# Patient Record
Sex: FEMALE | Race: WHITE | Employment: FULL TIME | URBAN - METROPOLITAN AREA
[De-identification: names, ages, dates, MRNs, and addresses within clinical notes are randomized per-mention and may not be internally consistent; named-entity substitution may affect disease eponyms.]

---

## 2021-02-12 ENCOUNTER — OFFICE VISIT (OUTPATIENT)
Dept: SURGERY | Age: 46
End: 2021-02-12
Payer: COMMERCIAL

## 2021-02-12 VITALS
DIASTOLIC BLOOD PRESSURE: 70 MMHG | BODY MASS INDEX: 38.98 KG/M2 | HEIGHT: 63 IN | WEIGHT: 220 LBS | TEMPERATURE: 97.1 F | SYSTOLIC BLOOD PRESSURE: 120 MMHG

## 2021-02-12 DIAGNOSIS — D17.1 LIPOMA OF TORSO: Primary | ICD-10-CM

## 2021-02-12 PROCEDURE — 99203 OFFICE O/P NEW LOW 30 MIN: CPT | Performed by: SURGERY

## 2021-02-12 ASSESSMENT — ENCOUNTER SYMPTOMS
BACK PAIN: 1
ABDOMINAL PAIN: 0
EYE ITCHING: 0
EYE DISCHARGE: 0
COLOR CHANGE: 0
ABDOMINAL DISTENTION: 0
APNEA: 0
CHEST TIGHTNESS: 0
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
## 2021-02-12 NOTE — PATIENT INSTRUCTIONS
Unable to appreciate a symptomatic left posterior flank soft tissue mass on exam.  Symptoms may be from a subfascial lipoma or other etiology.   Will order CT of chest to further define anatomy and return to office after the study to discuss results and further options

## 2021-02-17 ENCOUNTER — HOSPITAL ENCOUNTER (OUTPATIENT)
Dept: CT IMAGING | Age: 46
Discharge: HOME | End: 2021-02-17
Payer: COMMERCIAL

## 2021-02-17 DIAGNOSIS — D17.1 LIPOMA OF TORSO: ICD-10-CM

## 2021-02-17 PROCEDURE — 6360000004 HC RX CONTRAST MEDICATION: Performed by: SURGERY

## 2021-02-17 PROCEDURE — 71260 CT THORAX DX C+: CPT

## 2021-02-17 RX ADMIN — IOPAMIDOL 75 ML: 755 INJECTION, SOLUTION INTRAVENOUS at 11:20

## 2021-02-19 ENCOUNTER — OFFICE VISIT (OUTPATIENT)
Dept: SURGERY | Age: 46
End: 2021-02-19
Payer: COMMERCIAL

## 2021-02-19 VITALS
WEIGHT: 221 LBS | TEMPERATURE: 97 F | BODY MASS INDEX: 39.15 KG/M2 | SYSTOLIC BLOOD PRESSURE: 122 MMHG | DIASTOLIC BLOOD PRESSURE: 80 MMHG

## 2021-02-19 DIAGNOSIS — D17.1 LIPOMA OF TORSO: Primary | ICD-10-CM

## 2021-02-19 PROCEDURE — 99213 OFFICE O/P EST LOW 20 MIN: CPT | Performed by: SURGERY

## 2021-02-19 NOTE — PATIENT INSTRUCTIONS
1. No evidence by exam or imaging shows mass that would benefit from excision  2. Suspect musculoskeletal or neurologic cause for symptoms. Was to have neurology evaluation before moving from [place]  3. Will refer to Dr. Conchita Miguel to establish primary care  4.  Follow with general surgery as needed
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
## 2021-04-12 ENCOUNTER — OFFICE VISIT (OUTPATIENT)
Dept: PRIMARY CARE CLINIC | Age: 46
End: 2021-04-12
Payer: COMMERCIAL

## 2021-04-12 VITALS
HEART RATE: 70 BPM | DIASTOLIC BLOOD PRESSURE: 64 MMHG | WEIGHT: 228.6 LBS | SYSTOLIC BLOOD PRESSURE: 124 MMHG | BODY MASS INDEX: 40.49 KG/M2 | OXYGEN SATURATION: 99 %

## 2021-04-12 DIAGNOSIS — R91.8 ABNORMAL CT SCAN OF LUNG: Primary | ICD-10-CM

## 2021-04-12 DIAGNOSIS — J45.30 MILD PERSISTENT ASTHMA WITHOUT COMPLICATION: ICD-10-CM

## 2021-04-12 DIAGNOSIS — M54.2 CHRONIC NECK PAIN: ICD-10-CM

## 2021-04-12 DIAGNOSIS — B00.1 RECURRENT COLD SORES: ICD-10-CM

## 2021-04-12 DIAGNOSIS — R00.2 INTERMITTENT PALPITATIONS: ICD-10-CM

## 2021-04-12 DIAGNOSIS — Z86.69 HISTORY OF CHIARI MALFORMATION: ICD-10-CM

## 2021-04-12 DIAGNOSIS — K63.5 POLYP OF COLON, UNSPECIFIED PART OF COLON, UNSPECIFIED TYPE: ICD-10-CM

## 2021-04-12 DIAGNOSIS — Z87.442 HISTORY OF NEPHROLITHIASIS: ICD-10-CM

## 2021-04-12 DIAGNOSIS — R91.8 LUNG NODULES: ICD-10-CM

## 2021-04-12 DIAGNOSIS — G89.29 CHRONIC NECK PAIN: ICD-10-CM

## 2021-04-12 PROBLEM — J45.909 ASTHMA: Status: ACTIVE | Noted: 2021-04-12

## 2021-04-12 PROBLEM — D17.1 FLANK LIPOMA: Status: ACTIVE | Noted: 2021-04-12

## 2021-04-12 PROCEDURE — 99204 OFFICE O/P NEW MOD 45 MIN: CPT | Performed by: NURSE PRACTITIONER

## 2021-04-12 RX ORDER — MELOXICAM 15 MG/1
15 TABLET ORAL PRN
COMMUNITY
End: 2022-03-09

## 2021-04-12 SDOH — ECONOMIC STABILITY: FOOD INSECURITY: WITHIN THE PAST 12 MONTHS, THE FOOD YOU BOUGHT JUST DIDN'T LAST AND YOU DIDN'T HAVE MONEY TO GET MORE.: NEVER TRUE

## 2021-04-12 SDOH — ECONOMIC STABILITY: INCOME INSECURITY: HOW HARD IS IT FOR YOU TO PAY FOR THE VERY BASICS LIKE FOOD, HOUSING, MEDICAL CARE, AND HEATING?: NOT HARD AT ALL

## 2021-04-12 ASSESSMENT — PATIENT HEALTH QUESTIONNAIRE - PHQ9
SUM OF ALL RESPONSES TO PHQ QUESTIONS 1-9: 0
SUM OF ALL RESPONSES TO PHQ9 QUESTIONS 1 & 2: 0
SUM OF ALL RESPONSES TO PHQ QUESTIONS 1-9: 0
2. FEELING DOWN, DEPRESSED OR HOPELESS: 0
1. LITTLE INTEREST OR PLEASURE IN DOING THINGS: 0

## 2021-04-12 NOTE — PATIENT INSTRUCTIONS
Refer to pulmonology for further evaluation of abnormal chest CT    Refer to GI for further evaluation of colon polyps and due for cscope    Refer to nephrology to establish care    Let me know contact information of neurologist you'd like to see and I will place a referral.     Obtain previous records from specialists and PCP in [place]. Call when refills needed.

## 2021-04-12 NOTE — PROGRESS NOTES
ENCOUNTER DATE: 2021     NAME: aC Dahl   AGE: 48 y.o. GENDER: female   YOB: 1975    Patient Active Problem List   Diagnosis    Asthma    Flank lipoma    History of Chiari malformation    Polyp of colon    Lung nodules    Abnormal CT scan of lung    History of nephrolithiasis    Recurrent cold sores    Intermittent palpitations    Chronic neck pain      Allergies   Allergen Reactions    Coconut Fatty Acids     Proair Respiclick [Albuterol Sulfate]      Current Outpatient Medications on File Prior to Visit   Medication Sig Dispense Refill    meloxicam (MOBIC) 15 MG tablet Take 15 mg by mouth daily      valACYclovir (VALTREX) 1 g tablet valacyclovir 1 gram tablet      Multiple Vitamin (MULTIVITAMIN ADULT PO)       loratadine (CLARITIN) 10 MG tablet Take 10 mg by mouth daily      fluticasone-salmeterol (ADVAIR) 250-50 MCG/DOSE AEPB Inhale into the lungs 2 times daily      esomeprazole (NEXIUM) 20 MG delayed release capsule Take 20 mg by mouth every morning (before breakfast)      BIOTIN PO       ZINC OXIDE PO Take by mouth      Ascorbic Acid (VITAMIN C PO) Take by mouth      Cholecalciferol (VITAMIN D3 PO) Take by mouth      Montelukast Sodium (SINGULAIR PO) Take by mouth      Albuterol Sulfate (VENTOLIN HFA IN) Inhale into the lungs       No current facility-administered medications on file prior to visit.          Past Medical History:   Diagnosis Date    Asthma     Colon polyp     tubular adenoma    Osteoarthritis     Palpitations      Past Surgical History:   Procedure Laterality Date     SECTION      CHOLECYSTECTOMY      KIDNEY STONE SURGERY        Family History   Problem Relation Age of Onset    High Blood Pressure Mother     Other Mother         demen    Diabetes Father     High Cholesterol Father     High Blood Pressure Father     Atrial Fibrillation Father     Other Father         copd    Heart Attack Father      Social History years ago. Her PTH was elevated in the past.   Had PTH labs checked 4 years ago and levels were normal.   Brother had renal cell carcinoma. She would like to see nephro again to establish care. CARDIAC:  H/o palpitations  Had stress test in the past and was ok. Had several holter monitors and were negative. Saw cardio in [address] [place] [address] and LOOP recorder was placed in 2018. Will be due to remove in august 2021 in [address] [place] [address]  Has also had vein ablations  Never dx with arhythmia or heart disease. CHOL:  Last labs within the past year. Total 213. LDL elevated, unsure of number. Never been on chol med in the past.     LIPOMA:  Has several all over her body. Has had several removed. Saw Dr Jace Cummings at [facility] on 2/2021 for possible lipoma on her left back/rib area. Has been sore. Thought it was a muscle knot, but won't go away. CT was completed. No lipoma noted. Was told likely neuro or muscle related     GYN:  Had complete hystrectomy  Is on low dose estradiol and testosterone pellets per GYN in [address] [place] [address]  Last injection in Feb. Next round is due in May, which she will be [place] at that time and will obtain there. OA, NECK/BACK:  Takes mobic daily  Has h/o bulging discs in neck. Will have good days and bad. Had neuro work up in [address] [place] [address] and workup for MS was negative. Was told to follow up with neuro due to some \"narrowing in her neck canal\" and spondylosis. Has h/o chiari malformation and would like to establish with neuro here. She would like to research and find a neuro that specializes in chiari malformation, which she thinks may be in [place]. COLD SORES:  Takes valtrex occ for flare ups. Typically has breakout around her mouth. ROS:  Review of Systems   Constitutional: Negative for activity change, appetite change, chills, fatigue and unexpected weight change. HENT: Negative for congestion, ear pain, hearing loss, nosebleeds, postnasal drip, sneezing, sore throat, trouble swallowing and voice change.     Eyes: tenderness. Abdominal:      General: Abdomen is flat. Bowel sounds are normal. There is no distension. Palpations: Abdomen is soft. There is no mass. Tenderness: There is no abdominal tenderness. There is no guarding or rebound. Hernia: No hernia is present. Musculoskeletal: Normal range of motion. General: No deformity. Right lower leg: No edema. Left lower leg: No edema. Lymphadenopathy:      Cervical: No cervical adenopathy. Skin:     General: Skin is warm and dry. Coloration: Skin is not pale. Findings: No erythema or rash. Neurological:      General: No focal deficit present. Mental Status: She is alert and oriented to person, place, and time. Cranial Nerves: No cranial nerve deficit. Motor: No weakness. Gait: Gait normal.   Psychiatric:         Mood and Affect: Mood normal.         Behavior: Behavior normal.         Thought Content: Thought content normal.         Judgment: Judgment normal.      Ct Chest W Contrast    Result Date: 2/17/2021  EXAMINATION: CT OF THE CHEST WITH CONTRAST 2/17/2021 11:11 am TECHNIQUE: CT of the chest was performed with the administration of intravenous contrast. Multiplanar reformatted images are provided for review. Dose modulation, iterative reconstruction, and/or weight based adjustment of the mA/kV was utilized to reduce the radiation dose to as low as reasonably achievable. COMPARISON: None. HISTORY: ORDERING SYSTEM PROVIDED HISTORY: Lipoma of torso TECHNOLOGIST PROVIDED HISTORY: Reason for exam:->left posterior flank soft tissue mass Reason for Exam: left posterior flank soft tissue mass, lipoma of torso Acuity: Unknown Type of Exam: Unknown FINDINGS: Mediastinum: Coronary artery calcifications are a marker of atherosclerosis. There are no enlarged thoracic lymph nodes. Lungs/pleura: The tracheobronchial tree is patent. There is no pneumothorax or pleural effusion.  Throughout the bilateral lungs, there is nonspecific ground-glass opacities favoring infectious/inflammatory process. In addition, there are multiple solid pulmonary nodules within the superior segment of the right lower lobe, the largest of which measures 0.9 x 1.2 cm. Upper Abdomen: Status post cholecystectomy. Soft Tissues/Bones: Degenerative changes involve the thoracic spine. There is no fluid collection, soft tissue or fat mass in the left paracentral back at the indicated marker. A cardiac digital recorder device is present within the left breast.     1. No superficial mass or fluid collection within the left back. 2. Nonspecific bilateral ground-glass opacities favoring infectious/inflammatory process including atypical viral pneumonia. 3. Multiple solid right lower lobe pulmonary nodules. RECOMMENDATIONS: Fleischner Society guidelines for follow-up and management of incidentally detected pulmonary nodules: Multiple Solid Nodules: Nodule size greater than 8 mm In a low-risk patient, CT at 3-6 months, then consider CT at 18-24 months. In a high-risk patient, CT at 3-6 months, then CT at 18-24 months. -     ASSESSMENT/PLAN:  1. Abnormal CT scan of lung  Recent CT reviewed and discussed. Refer to pulmonology for further evaluation.   - [facility] Pulmonology    2. Lung nodules  Recent CT reviewed and discussed. Refer to pulmonology for further evaluation.  - [facility] Pulmonology    3. Polyp of colon, unspecified part of colon, unspecified type  No previous cscope reports available  Will obtain previous records. Releases signed. Refer to GI to est care. Patient states she is due for repeat cscope. - Jeff Velazquez MD, Gastroenterology, [facility]    4. Mild persistent asthma without complication  Stable on current regimen. 5. History of nephrolithiasis  Stable.    Patient requests nephro referral rather than urology to establish care due to her h/o elevated PTH levels, nephrolithiasis, and FH of renal cell carcinoma. Release signed to obtain previous nephro records. - Godfrey Marie MD, Nephrology, [place]    6. Intermittent palpitations  Stable. Has LOOP recorder. Due to be removed this year. 7. History of Chiari malformation  Wants to be referred to a neurologist here to establish care. Patient is currently researching neuro that specializes in chiari malformation and will let me know who she would like to see. 8. Recurrent cold sores  Stable. Advised to call when valtrex rf needed. 9. Chronic neck pain  Stable on mobic. Wants to be referred to neuro at a later date to establish care. She will let me know who she would like to see. She wants to research specialists. Releases signed today to obtain previous PCP, neuro, nephro records and previous labs. Return in about 3 months (around 7/12/2021) for chronic visit. Will plan on physical with fasting labs at that time.        SPENT OVER 50 MINUTES IN ROOM WITH PATIENT   Electronically signed by JEANNE Kilgore CNP on 4/12/2021 at 11:55 AM

## 2021-04-13 PROBLEM — K63.5 POLYP OF COLON: Status: ACTIVE | Noted: 2021-04-13

## 2021-04-13 PROBLEM — Z87.442 HISTORY OF NEPHROLITHIASIS: Status: ACTIVE | Noted: 2021-04-13

## 2021-04-13 PROBLEM — M54.2 CHRONIC NECK PAIN: Status: ACTIVE | Noted: 2021-04-13

## 2021-04-13 PROBLEM — G89.29 CHRONIC NECK PAIN: Status: ACTIVE | Noted: 2021-04-13

## 2021-04-13 PROBLEM — R00.2 INTERMITTENT PALPITATIONS: Status: ACTIVE | Noted: 2021-04-13

## 2021-04-13 PROBLEM — R91.8 LUNG NODULES: Status: ACTIVE | Noted: 2021-04-13

## 2021-04-13 PROBLEM — B00.1 RECURRENT COLD SORES: Status: ACTIVE | Noted: 2021-04-13

## 2021-04-13 PROBLEM — R91.8 ABNORMAL CT SCAN OF LUNG: Status: ACTIVE | Noted: 2021-04-13

## 2021-04-13 ASSESSMENT — ENCOUNTER SYMPTOMS
EYE PAIN: 0
VOICE CHANGE: 0
TROUBLE SWALLOWING: 0
DIARRHEA: 0
ABDOMINAL DISTENTION: 0
NAUSEA: 0
CHEST TIGHTNESS: 0
VOMITING: 0
CONSTIPATION: 0
BLOOD IN STOOL: 0
SORE THROAT: 0
BACK PAIN: 1
ABDOMINAL PAIN: 0
EYE DISCHARGE: 0
EYE ITCHING: 0
COLOR CHANGE: 0
SHORTNESS OF BREATH: 0
COUGH: 0
WHEEZING: 0

## 2021-04-21 ENCOUNTER — OFFICE VISIT (OUTPATIENT)
Dept: PULMONOLOGY | Age: 46
End: 2021-04-21
Payer: COMMERCIAL

## 2021-04-21 VITALS
WEIGHT: 220 LBS | TEMPERATURE: 97.9 F | HEART RATE: 88 BPM | OXYGEN SATURATION: 98 % | RESPIRATION RATE: 16 BRPM | HEIGHT: 63 IN | SYSTOLIC BLOOD PRESSURE: 110 MMHG | DIASTOLIC BLOOD PRESSURE: 70 MMHG | BODY MASS INDEX: 38.98 KG/M2

## 2021-04-21 DIAGNOSIS — G47.33 OSA ON CPAP: ICD-10-CM

## 2021-04-21 DIAGNOSIS — J12.82 PNEUMONIA DUE TO COVID-19 VIRUS: ICD-10-CM

## 2021-04-21 DIAGNOSIS — Z99.89 OSA ON CPAP: ICD-10-CM

## 2021-04-21 DIAGNOSIS — U07.1 PNEUMONIA DUE TO COVID-19 VIRUS: ICD-10-CM

## 2021-04-21 DIAGNOSIS — R91.8 PULMONARY NODULES: Primary | ICD-10-CM

## 2021-04-21 PROCEDURE — 99204 OFFICE O/P NEW MOD 45 MIN: CPT | Performed by: INTERNAL MEDICINE

## 2021-04-21 NOTE — PROGRESS NOTES
[facility] Pulmonary and Critical Care    Outpatient Initial Note    Subjective:   CHIEF COMPLAINT / HPI:     The patient is 48 y.o. female who presents today for a new patient visit for abnormal CT chest.  She was concerned that she had a mass on her posterior flank. She saw surgery who was unable to appreciate it so they ordered a CT chest.  There is no subcutaneous mass but she did have diffuse groundglass with several right lower lobe pulmonary nodules largest measuring 1.2 cm. Approximately 4 weeks prior to her CT chest she was diagnosed with Covid 19. She was hospitalized in [place] from January 18 January 25. She was treated with remdesivir, dexamethasone, oxygen up to 7 L, and self proned. She did not need the ICU or high flow oxygen. Currently her dyspnea on exertion is improved from when she had Covid but still has some shortness of breath when she walks at a fast pace. She has no cough, wheezing, or chest tightness. She has no fever or anorexia. . She initially lost weight with COVID-19 but has gained it all back. She has no personal history of malignancy but states that she has had colon polyps and ovarian polyps    Past Medical History:    Past Medical History:   Diagnosis Date    Asthma     Chronic back pain     Colon polyp     tubular adenoma    Osteoarthritis     Palpitations        Social History:    Patient is . No history of tobacco use.   She works in [employer] History:  DM   Atrial fibrillation  COPD  TB    Current Medications:  Current Outpatient Medications on File Prior to Visit   Medication Sig Dispense Refill    meloxicam (MOBIC) 15 MG tablet Take 15 mg by mouth daily      valACYclovir (VALTREX) 1 g tablet valacyclovir 1 gram tablet      Multiple Vitamin (MULTIVITAMIN ADULT PO)       loratadine (CLARITIN) 10 MG tablet Take 10 mg by mouth daily      fluticasone-salmeterol (ADVAIR) 250-50 MCG/DOSE AEPB Inhale into the lungs 2 times daily      esomeprazole (NEXIUM) 20 MG delayed release capsule Take 20 mg by mouth every morning (before breakfast)      BIOTIN PO       ZINC OXIDE PO Take by mouth      Ascorbic Acid (VITAMIN C PO) Take by mouth      Cholecalciferol (VITAMIN D3 PO) Take by mouth      Montelukast Sodium (SINGULAIR PO) Take by mouth      Albuterol Sulfate (VENTOLIN HFA IN) Inhale into the lungs       No current facility-administered medications on file prior to visit. REVIEW OF SYSTEMS:    CONSTITUTIONAL: Negative for fevers and chills  HEENT: Negative for oropharyngeal exudate, post nasal drip, sinus pain / pressure, nasal congestion, ear pain  RESPIRATORY:  See HPI  CARDIOVASCULAR: Negative for chest pain, palpitations, edema  GASTROINTESTINAL: Negative for nausea, vomiting, diarrhea, constipation and abdominal pain  GENITOURINARY: Negative for dysuria, urinary frequency, urinary hesitancy  HEMATOLOGICAL: Negative for adenopathy  SKIN: Negative for clubbing, cyanosis, skin lesions  ENDOCRINE: Negative for polyuria, polydipsia, heat intolerance, cold intolerance   EXTREMITIES: Negative for weakness or decreased ROM in all extremities  NEUROLOGICAL: Negative for unilateral weakness, speech or gait abnormalities    Objective:   PHYSICAL EXAM:        VITALS:  /70 (Site: Left Upper Arm, Position: Sitting, Cuff Size: Large Adult)   Pulse 88   Temp 97.9 °F (36.6 °C) (Infrared)   Resp 16   Ht 5' 3\" (1.6 m)   Wt 220 lb (99.8 kg)   SpO2 98%   Breastfeeding No   BMI 38.97 kg/m²     CONSTITUTIONAL:  Awake, alert, cooperative, no apparent distress, and appears stated age  HEENT: No oropharyngeal exudate, PERRL, no cervical adenopathy, no tracheal deviation, thyroid size normal  LUNGS:  No increased work of breathing and clear to auscultation, no crackles or wheezing  CARDIOVASCULAR:  normal S1 and S2 and no JVD  ABDOMEN:  Normal bowel sounds, non-distended and non-tender to palpation  EXT: No edema, no calf tenderness.  Pulses are present bilaterally. NEUROLOGIC:  Mental Status Exam:  Level of Alertness:   awake  Orientation:   person, place, time. SKIN:  normal skin color, texture, turgor, no redness, warmth, or swelling     DATA:      Radiology Review:  Pertinent images / reports were reviewed as a part of this visit. CT chest 2/17/2021 reveals the following:  FINDINGS:   Mediastinum: Coronary artery calcifications are a marker of atherosclerosis. There are no enlarged thoracic lymph nodes.       Lungs/pleura: The tracheobronchial tree is patent. Lunette Marino is no pneumothorax   or pleural effusion.       Throughout the bilateral lungs, there is nonspecific ground-glass opacities   favoring infectious/inflammatory process.  In addition, there are multiple   solid pulmonary nodules within the superior segment of the right lower lobe,   the largest of which measures 0.9 x 1.2 cm.       Upper Abdomen: Status post cholecystectomy.       Soft Tissues/Bones: Degenerative changes involve the thoracic spine.  There   is no fluid collection, soft tissue or fat mass in the left paracentral back   at the indicated marker.  A cardiac digital recorder device is present within   the left breast.           Impression   1. No superficial mass or fluid collection within the left back. 2. Nonspecific bilateral ground-glass opacities favoring   infectious/inflammatory process including atypical viral pneumonia. 3. Multiple solid right lower lobe pulmonary nodules. Last PFTs:  None on file    Assessment:      Diagnosis Orders   1. Pulmonary nodules  CT CHEST WO CONTRAST   2. ABBY on CPAP     3. Pneumonia due to COVID-19 virus         Plan:   1. Obtain previous chest x-ray and CT abdomen from [facility] for comparison  2. Her diffuse groundglass opacity likely represents her COVID-19 pneumonia. The more dense right lower lobe pulmonary nodules may represent a different entity. Repeat CT chest mid May.   If nodules persist then PET CT or biopsy will likely be needed  3. She does not seem to be well controlled on her current CPAP settings. Will refer for PAP titration  4.   Follow-up in 4 weeks    Time spent on visit approximately 55 minutes

## 2021-04-23 ENCOUNTER — TELEPHONE (OUTPATIENT)
Dept: PULMONOLOGY | Age: 46
End: 2021-04-23

## 2021-04-23 NOTE — TELEPHONE ENCOUNTER
Request imaging results to be faxed and download to a disc and mail (if possible) from [facility].     Phone [phone]  Fax [phone]

## 2021-05-13 ENCOUNTER — HOSPITAL ENCOUNTER (OUTPATIENT)
Dept: CT IMAGING | Age: 46
Discharge: HOME | End: 2021-05-13
Payer: COMMERCIAL

## 2021-05-13 DIAGNOSIS — R91.8 PULMONARY NODULES: ICD-10-CM

## 2021-05-13 PROCEDURE — 71250 CT THORAX DX C-: CPT

## 2021-05-18 ENCOUNTER — OFFICE VISIT (OUTPATIENT)
Dept: PULMONOLOGY | Age: 46
End: 2021-05-18
Payer: COMMERCIAL

## 2021-05-18 VITALS
SYSTOLIC BLOOD PRESSURE: 122 MMHG | OXYGEN SATURATION: 97 % | HEIGHT: 63 IN | BODY MASS INDEX: 41.11 KG/M2 | RESPIRATION RATE: 16 BRPM | DIASTOLIC BLOOD PRESSURE: 82 MMHG | HEART RATE: 74 BPM | TEMPERATURE: 97 F | WEIGHT: 232 LBS

## 2021-05-18 DIAGNOSIS — U07.1 PNEUMONIA DUE TO COVID-19 VIRUS: ICD-10-CM

## 2021-05-18 DIAGNOSIS — J12.82 PNEUMONIA DUE TO COVID-19 VIRUS: ICD-10-CM

## 2021-05-18 DIAGNOSIS — R91.1 PULMONARY NODULE: Primary | ICD-10-CM

## 2021-05-18 DIAGNOSIS — G47.33 OSA ON CPAP: ICD-10-CM

## 2021-05-18 DIAGNOSIS — Z99.89 OSA ON CPAP: ICD-10-CM

## 2021-05-18 PROCEDURE — 99214 OFFICE O/P EST MOD 30 MIN: CPT | Performed by: INTERNAL MEDICINE

## 2021-05-18 NOTE — PROGRESS NOTES
[facility] Pulmonary and Critical Care    Outpatient Follow Up Note    Subjective:   CHIEF COMPLAINT / HPI:     The patient is 48 y.o. female who is here for follow-up of abnormal CT chest.  She is having no dyspnea on exertion, cough, wheezing, chest pain, fevers, chills, anorexia, or weight loss    4/21/2021  Gayle Stevens presents today for a new patient visit for abnormal CT chest.  She was concerned that she had a mass on her posterior flank. She saw surgery who was unable to appreciate it so they ordered a CT chest.  There is no subcutaneous mass but she did have diffuse groundglass with several right lower lobe pulmonary nodules largest measuring 1.2 cm. Approximately 4 weeks prior to her CT chest she was diagnosed with Covid 19. She was hospitalized in [place] from January 18 January 25. She was treated with remdesivir, dexamethasone, oxygen up to 7 L, and self proned. She did not need the ICU or high flow oxygen. Currently her dyspnea on exertion is improved from when she had Covid but still has some shortness of breath when she walks at a fast pace. She has no cough, wheezing, or chest tightness. She has no fever or anorexia. . She initially lost weight with COVID-19 but has gained it all back. She has no personal history of malignancy but states that she has had colon polyps and ovarian polyps    Past Medical History:    Past Medical History:   Diagnosis Date    Asthma     Chronic back pain     Colon polyp     tubular adenoma    Osteoarthritis     Palpitations        Social History:    Patient is . No history of tobacco use.   She works in [employer] History:  DM   Atrial fibrillation  COPD  TB    Current Medications:  Current Outpatient Medications on File Prior to Visit   Medication Sig Dispense Refill    meloxicam (MOBIC) 15 MG tablet Take 15 mg by mouth daily      valACYclovir (VALTREX) 1 g tablet valacyclovir 1 gram tablet      Multiple Vitamin (MULTIVITAMIN ADULT PO)       loratadine (CLARITIN) 10 MG tablet Take 10 mg by mouth daily      fluticasone-salmeterol (ADVAIR) 250-50 MCG/DOSE AEPB Inhale into the lungs 2 times daily      esomeprazole (NEXIUM) 20 MG delayed release capsule Take 20 mg by mouth every morning (before breakfast)      BIOTIN PO       ZINC OXIDE PO Take by mouth      Ascorbic Acid (VITAMIN C PO) Take by mouth      Cholecalciferol (VITAMIN D3 PO) Take by mouth      Montelukast Sodium (SINGULAIR PO) Take by mouth      Albuterol Sulfate (VENTOLIN HFA IN) Inhale into the lungs       No current facility-administered medications on file prior to visit.        REVIEW OF SYSTEMS:    CONSTITUTIONAL: Negative for fevers and chills  HEENT: Negative for oropharyngeal exudate, post nasal drip, sinus pain / pressure, nasal congestion, ear pain  RESPIRATORY:  See HPI  CARDIOVASCULAR: Negative for chest pain, palpitations, edema  GASTROINTESTINAL: Negative for nausea, vomiting, diarrhea, constipation and abdominal pain  GENITOURINARY: Negative for dysuria, urinary frequency, urinary hesitancy  HEMATOLOGICAL: Negative for adenopathy  SKIN: Negative for clubbing, cyanosis, skin lesions  ENDOCRINE: Negative for polyuria, polydipsia, heat intolerance, cold intolerance   EXTREMITIES: Negative for weakness or decreased ROM in all extremities  NEUROLOGICAL: Negative for unilateral weakness, speech or gait abnormalities    Objective:   PHYSICAL EXAM:        VITALS:  /82 (Site: Left Upper Arm, Position: Sitting, Cuff Size: Large Adult)   Pulse 74   Temp 97 °F (36.1 °C) (Infrared)   Resp 16   Ht 5' 3\" (1.6 m)   Wt 232 lb (105.2 kg)   SpO2 97%   Breastfeeding No   BMI 41.10 kg/m²     CONSTITUTIONAL:  Awake, alert, cooperative, no apparent distress, and appears stated age  HEENT: No oropharyngeal exudate, PERRL, no cervical adenopathy, no tracheal deviation, thyroid size normal  LUNGS:  No increased work of breathing and clear to auscultation, no crackles or wheezing  CARDIOVASCULAR:  normal S1 and S2 and no JVD  ABDOMEN:  Normal bowel sounds, non-distended and non-tender to palpation  EXT: No edema, no calf tenderness. Pulses are present bilaterally. NEUROLOGIC:  Mental Status Exam:  Level of Alertness:   awake  Orientation:   person, place, time. SKIN:  normal skin color, texture, turgor, no redness, warmth, or swelling     DATA:      Radiology Review:  Pertinent images / reports were reviewed as a part of this visit. FINDINGS:       Lungs and Airways: Interval improved appearance of the diffuse groundglass opacities previously noted. Faint groundglass opacities of the lung bases predominantly persists. Pulmonary nodules of the superior segment of the right lower lobe are again    identified, the largest of which measures 1.0 x 0.9 cm, image 37 of series 601. Additional smaller, subcentimeter regional nodules are noted. There is no new or progressive pulmonary nodularity.       Pleura: No pleural effusions or pleural thickening.       Heart and Great Vessels: Heart size is normal.       Adenopathy: None.       Chest Wall / Lower Neck: No significant abnormality.       Upper Abdomen: Surgical absence of the gallbladder.       Bones: No significant abnormality.           Impression       Essentially stable pulmonary nodules, the largest nodule of the superior segment of the right lower lobe measuring up to 1.0 cm. This is stable from February 2021. No more remote prior studies are available. At a minimum, continued imaging surveillance    is recommended.       Interval improved appearance of multiple groundglass pulmonary opacities         CT chest 2/17/2021 reveals the following:  FINDINGS:   Mediastinum: Coronary artery calcifications are a marker of atherosclerosis. There are no enlarged thoracic lymph nodes.       Lungs/pleura:  The tracheobronchial tree is patent. Donneta Roller is no pneumothorax   or pleural effusion.       Throughout the bilateral lungs, there is nonspecific ground-glass opacities   favoring infectious/inflammatory process.  In addition, there are multiple   solid pulmonary nodules within the superior segment of the right lower lobe,   the largest of which measures 0.9 x 1.2 cm.       Upper Abdomen: Status post cholecystectomy.       Soft Tissues/Bones: Degenerative changes involve the thoracic spine.  There   is no fluid collection, soft tissue or fat mass in the left paracentral back   at the indicated marker.  A cardiac digital recorder device is present within   the left breast.           Impression   1. No superficial mass or fluid collection within the left back. 2. Nonspecific bilateral ground-glass opacities favoring   infectious/inflammatory process including atypical viral pneumonia. 3. Multiple solid right lower lobe pulmonary nodules. Last PFTs:  None on file    Assessment:      Diagnosis Orders   1. Pulmonary nodule  CT CHEST LOW DOSE (LDCT)   2. ABBY on CPAP     3. Pneumonia due to COVID-19 virus         Plan: On my review of CT chest she has resolution of her groundglass opacity. This was likely from COVID-19    Her right lower lobe pulmonary nodule does appear to be smaller -approximately 9 mm x 10 mm from 12 mm x 13 mm.   Will repeat CT chest in 3 months for continued surveillance    Repeat CPAP titration is pending    Follow-up in 3 months

## 2021-07-30 ENCOUNTER — HOSPITAL ENCOUNTER (OUTPATIENT)
Dept: CT IMAGING | Age: 46
Discharge: HOME | End: 2021-07-30
Payer: COMMERCIAL

## 2021-07-30 DIAGNOSIS — R91.1 PULMONARY NODULE: ICD-10-CM

## 2021-07-30 PROCEDURE — 71250 CT THORAX DX C-: CPT

## 2021-08-17 ENCOUNTER — OFFICE VISIT (OUTPATIENT)
Dept: PULMONOLOGY | Age: 46
End: 2021-08-17
Payer: COMMERCIAL

## 2021-08-17 VITALS
HEIGHT: 63 IN | OXYGEN SATURATION: 98 % | BODY MASS INDEX: 42.35 KG/M2 | DIASTOLIC BLOOD PRESSURE: 68 MMHG | HEART RATE: 67 BPM | TEMPERATURE: 97 F | WEIGHT: 239 LBS | SYSTOLIC BLOOD PRESSURE: 110 MMHG | RESPIRATION RATE: 16 BRPM

## 2021-08-17 DIAGNOSIS — Z99.89 OSA ON CPAP: ICD-10-CM

## 2021-08-17 DIAGNOSIS — R91.8 PULMONARY NODULES: Primary | ICD-10-CM

## 2021-08-17 DIAGNOSIS — G47.33 OSA ON CPAP: ICD-10-CM

## 2021-08-17 PROCEDURE — 99214 OFFICE O/P EST MOD 30 MIN: CPT | Performed by: INTERNAL MEDICINE

## 2021-08-17 NOTE — PROGRESS NOTES
[facility] Pulmonary and Critical Care    Outpatient Follow Up Note    Subjective:   CHIEF COMPLAINT / HPI:     The patient is 48 y.o. female who is here for follow-up of multiple right lower lobe pulmonary nodules. Overall she offers no complaints and denies any fevers, chills, cough, anorexia, dyspnea exertion, wheezing, or chest pain    5/18/2021  Levi Cunningham is here for follow-up of abnormal CT chest.  She is having no dyspnea on exertion, cough, wheezing, chest pain, fevers, chills, anorexia, or weight loss    4/21/2021  Levi Cunningham presents today for a new patient visit for abnormal CT chest.  She was concerned that she had a mass on her posterior flank. She saw surgery who was unable to appreciate it so they ordered a CT chest.  There is no subcutaneous mass but she did have diffuse groundglass with several right lower lobe pulmonary nodules largest measuring 1.2 cm. Approximately 4 weeks prior to her CT chest she was diagnosed with Covid 19. She was hospitalized in [place] from January 18 January 25. She was treated with remdesivir, dexamethasone, oxygen up to 7 L, and self proned. She did not need the ICU or high flow oxygen. Currently her dyspnea on exertion is improved from when she had Covid but still has some shortness of breath when she walks at a fast pace. She has no cough, wheezing, or chest tightness. She has no fever or anorexia. . She initially lost weight with COVID-19 but has gained it all back. She has no personal history of malignancy but states that she has had colon polyps and ovarian polyps    Past Medical History:    Past Medical History:   Diagnosis Date    Asthma     Chronic back pain     Colon polyp     tubular adenoma    Osteoarthritis     Palpitations        Social History:    Patient is . No history of tobacco use.   She works in [employer] History:  DM   Atrial fibrillation  COPD  TB    Current Medications:  Current Outpatient Medications on File Prior to Visit   Medication Sig Dispense Refill    meloxicam (MOBIC) 15 MG tablet Take 15 mg by mouth daily      valACYclovir (VALTREX) 1 g tablet valacyclovir 1 gram tablet      Multiple Vitamin (MULTIVITAMIN ADULT PO)       loratadine (CLARITIN) 10 MG tablet Take 10 mg by mouth daily      fluticasone-salmeterol (ADVAIR) 250-50 MCG/DOSE AEPB Inhale into the lungs 2 times daily      esomeprazole (NEXIUM) 20 MG delayed release capsule Take 20 mg by mouth every morning (before breakfast)      BIOTIN PO       ZINC OXIDE PO Take by mouth      Ascorbic Acid (VITAMIN C PO) Take by mouth      Cholecalciferol (VITAMIN D3 PO) Take by mouth      Montelukast Sodium (SINGULAIR PO) Take by mouth      Albuterol Sulfate (VENTOLIN HFA IN) Inhale into the lungs       No current facility-administered medications on file prior to visit.        REVIEW OF SYSTEMS:    CONSTITUTIONAL: Negative for fevers and chills  HEENT: Negative for oropharyngeal exudate, post nasal drip, sinus pain / pressure, nasal congestion, ear pain  RESPIRATORY:  See HPI  CARDIOVASCULAR: Negative for chest pain, palpitations, edema  GASTROINTESTINAL: Negative for nausea, vomiting, diarrhea, constipation and abdominal pain  GENITOURINARY: Negative for dysuria, urinary frequency, urinary hesitancy  HEMATOLOGICAL: Negative for adenopathy  SKIN: Negative for clubbing, cyanosis, skin lesions  ENDOCRINE: Negative for polyuria, polydipsia, heat intolerance, cold intolerance   EXTREMITIES: Negative for weakness or decreased ROM in all extremities  NEUROLOGICAL: Negative for unilateral weakness, speech or gait abnormalities    Objective:   PHYSICAL EXAM:        VITALS:  /68 (Site: Left Upper Arm, Position: Sitting, Cuff Size: Large Adult)   Pulse 67   Temp 97 °F (36.1 °C) (Infrared)   Resp 16   Ht 5' 3\" (1.6 m)   Wt 239 lb (108.4 kg)   SpO2 98%   Breastfeeding No   BMI 42.34 kg/m²     CONSTITUTIONAL:  Awake, alert, cooperative, no apparent distress, and appears stated age  [de-identified]: No oropharyngeal exudate, PERRL, no cervical adenopathy, no tracheal deviation, thyroid size normal  LUNGS:  No increased work of breathing and clear to auscultation, no crackles or wheezing  CARDIOVASCULAR:  normal S1 and S2 and no JVD  ABDOMEN:  Normal bowel sounds, non-distended and non-tender to palpation  EXT: No edema, no calf tenderness. Pulses are present bilaterally. NEUROLOGIC:  Mental Status Exam:  Level of Alertness:   awake  Orientation:   person, place, time. SKIN:  normal skin color, texture, turgor, no redness, warmth, or swelling     DATA:      Radiology Review:  Pertinent images / reports were reviewed as a part of this visit. CT chest July 30, 2021  FINDINGS:       AIRWAYS: The central airways are patent.       LUNGS: No consolidation or residual ground glass attenuation.       NODULES: Unchanged nodular density in the superior segment right lower lobe measuring 10 x 7 mm (series 4, image 66). There are other scattered smaller satellite nodules in this region. Unchanged 2 mm nodule in the lingula (image 81). No new or enlarging    nodules.       PLEURA: Normal.       HEART / GREAT VESSELS: Heart is not enlarged. .       LYMPH NODES: No lymphadenopathy.       CHEST WALL: Implanted cardiac monitor device in the left anterior chest wall.       BONES: No destructive osseous process.       UPPER ABDOMEN: Prior cholecystectomy.               Impression   Stable 10 x 7 mm nodule in the superior 7 right lower lobe with scattered other smaller pulmonary nodules.         CT chest May 13, 2021    FINDINGS:       Lungs and Airways: Interval improved appearance of the diffuse groundglass opacities previously noted. Faint groundglass opacities of the lung bases predominantly persists. Pulmonary nodules of the superior segment of the right lower lobe are again    identified, the largest of which measures 1.0 x 0.9 cm, image 37 of series 601.  Additional smaller, subcentimeter regional nodules are noted. There is no new or progressive pulmonary nodularity.       Pleura: No pleural effusions or pleural thickening.       Heart and Great Vessels: Heart size is normal.       Adenopathy: None.       Chest Wall / Lower Neck: No significant abnormality.       Upper Abdomen: Surgical absence of the gallbladder.       Bones: No significant abnormality.           Impression       Essentially stable pulmonary nodules, the largest nodule of the superior segment of the right lower lobe measuring up to 1.0 cm. This is stable from February 2021. No more remote prior studies are available. At a minimum, continued imaging surveillance    is recommended.       Interval improved appearance of multiple groundglass pulmonary opacities     CT chest 2/17/2021 reveals the following:  FINDINGS:   Mediastinum: Coronary artery calcifications are a marker of atherosclerosis. There are no enlarged thoracic lymph nodes.       Lungs/pleura: The tracheobronchial tree is patent. Leisa Sidra is no pneumothorax   or pleural effusion.       Throughout the bilateral lungs, there is nonspecific ground-glass opacities   favoring infectious/inflammatory process.  In addition, there are multiple   solid pulmonary nodules within the superior segment of the right lower lobe,   the largest of which measures 0.9 x 1.2 cm.       Upper Abdomen: Status post cholecystectomy.       Soft Tissues/Bones: Degenerative changes involve the thoracic spine.  There   is no fluid collection, soft tissue or fat mass in the left paracentral back   at the indicated marker.  A cardiac digital recorder device is present within   the left breast.           Impression   1. No superficial mass or fluid collection within the left back. 2. Nonspecific bilateral ground-glass opacities favoring   infectious/inflammatory process including atypical viral pneumonia. 3. Multiple solid right lower lobe pulmonary nodules.      Last PFTs:  None on file    Assessment:      Diagnosis Orders   1. Pulmonary nodules  CT CHEST LOW DOSE (LDCT)   2. ABBY on CPAP  Home Sleep Study       Plan:       Her dominant right lower lobe pulmonary nodule does appear to be smaller from initial scan in February 2021 but unchanged from May. A few satellite pulmonary nodules are unchanged. Current size is approximately 9 mm x 10 mm from 12 mm x 13 mm in February. Will repeat CT chest in 6 months for continued surveillance. As long as pulmonary nodules are unchanged/not growing then imaging will go through February 17, 2023    Order home CPAP titration. Her current CPAP machine is nonfunctional and she cannot get a hold of her previous CPAP titration    Patient had COVID-19 in February 2021. I brought up the subject of vaccination however she is not interested    Follow-up in 6 months    On this date 8/17/2021 I have spent 31 minutes reviewing previous notes, test results and face to face with the patient discussing the diagnosis and importance of compliance with the treatment plan as well as documenting on the day of the visit.

## 2021-09-28 LAB — MAMMOGRAPHY, EXTERNAL: NORMAL

## 2021-10-21 ENCOUNTER — OFFICE VISIT (OUTPATIENT)
Dept: PRIMARY CARE CLINIC | Age: 46
End: 2021-10-21
Payer: COMMERCIAL

## 2021-10-21 ENCOUNTER — HOSPITAL ENCOUNTER (OUTPATIENT)
Dept: CT IMAGING | Age: 46
Discharge: HOME | End: 2021-10-21
Payer: COMMERCIAL

## 2021-10-21 ENCOUNTER — TELEPHONE (OUTPATIENT)
Dept: PRIMARY CARE CLINIC | Age: 46
End: 2021-10-21

## 2021-10-21 ENCOUNTER — NURSE TRIAGE (OUTPATIENT)
Dept: OTHER | Facility: CLINIC | Age: 46
End: 2021-10-21

## 2021-10-21 VITALS
SYSTOLIC BLOOD PRESSURE: 126 MMHG | OXYGEN SATURATION: 98 % | HEART RATE: 93 BPM | TEMPERATURE: 98.4 F | DIASTOLIC BLOOD PRESSURE: 72 MMHG | BODY MASS INDEX: 40.35 KG/M2 | WEIGHT: 227.8 LBS

## 2021-10-21 DIAGNOSIS — R35.0 URINARY FREQUENCY: ICD-10-CM

## 2021-10-21 DIAGNOSIS — R10.9 FLANK PAIN: ICD-10-CM

## 2021-10-21 DIAGNOSIS — Z11.3 SCREEN FOR STD (SEXUALLY TRANSMITTED DISEASE): ICD-10-CM

## 2021-10-21 DIAGNOSIS — N30.00 ACUTE CYSTITIS WITHOUT HEMATURIA: ICD-10-CM

## 2021-10-21 DIAGNOSIS — J45.909 UNCOMPLICATED ASTHMA, UNSPECIFIED ASTHMA SEVERITY, UNSPECIFIED WHETHER PERSISTENT: ICD-10-CM

## 2021-10-21 DIAGNOSIS — R35.0 URINARY FREQUENCY: Primary | ICD-10-CM

## 2021-10-21 LAB
BILIRUBIN, POC: NEGATIVE
BLOOD URINE, POC: NEGATIVE
CLARITY, POC: CLEAR
COLOR, POC: NORMAL
GLUCOSE URINE, POC: NEGATIVE
KETONES, POC: 15
LEUKOCYTE EST, POC: NEGATIVE
NITRITE, POC: NEGATIVE
PH, POC: 6.5
PROTEIN, POC: NEGATIVE
SPECIFIC GRAVITY, POC: >=1.03
UROBILINOGEN, POC: 0.2

## 2021-10-21 PROCEDURE — 81002 URINALYSIS NONAUTO W/O SCOPE: CPT | Performed by: STUDENT IN AN ORGANIZED HEALTH CARE EDUCATION/TRAINING PROGRAM

## 2021-10-21 PROCEDURE — 74176 CT ABD & PELVIS W/O CONTRAST: CPT

## 2021-10-21 PROCEDURE — 99214 OFFICE O/P EST MOD 30 MIN: CPT | Performed by: STUDENT IN AN ORGANIZED HEALTH CARE EDUCATION/TRAINING PROGRAM

## 2021-10-21 RX ORDER — NITROFURANTOIN 25; 75 MG/1; MG/1
100 CAPSULE ORAL 2 TIMES DAILY
Qty: 10 CAPSULE | Refills: 0 | Status: SHIPPED | OUTPATIENT
Start: 2021-10-21 | End: 2021-10-26

## 2021-10-21 RX ORDER — ALBUTEROL SULFATE 90 UG/1
2 AEROSOL, METERED RESPIRATORY (INHALATION) EVERY 6 HOURS PRN
Qty: 18 G | Refills: 3 | Status: SHIPPED | OUTPATIENT
Start: 2021-10-21 | End: 2022-01-24 | Stop reason: SDUPTHER

## 2021-10-21 NOTE — TELEPHONE ENCOUNTER
Reason for Disposition   Side (flank) or lower back pain present   Urinating more frequently than usual (i.e., frequency)    Answer Assessment - Initial Assessment Questions  1. SYMPTOM: \"What's the main symptom you're concerned about? \" (e.g., frequency, incontinence)      Back pain and urinary frequency. 2. ONSET: \"When did the  symptoms  start? \"      A couple weeks. 3. PAIN: \"Is there any pain? \" If so, ask: \"How bad is it? \" (Scale: 1-10; mild, moderate, severe)      Fluctuates, 6/10 at the worst.     4. CAUSE: \"What do you think is causing the symptoms? \"      Kidney stones or UTI     5. OTHER SYMPTOMS: \"Do you have any other symptoms? \" (e.g., fever, flank pain, blood in urine, pain with urination)      Pressure, urgency and back pain. States she doesn't feel like she completes her stream at time. 6. PREGNANCY: \"Is there any chance you are pregnant? \" \"When was your last menstrual period? \"      N/a    Protocols used: URINARY SYMPTOMS-ADULT-[place]    Received call from [address] [facility] at [facility] with Red Flag Complaint. Brief description of triage: Back pain, urinary frequency and feels like she doesn't complete stream at times. Triage indicates for patient to be seen today or go to [facility]. Care advice provided, patient verbalizes understanding; denies any other questions or concerns; instructed to call back for any new or worsening symptoms. Writer provided warm transfer to [facility] [place] [facility]  at [facility] for appointment scheduling. Attention Provider: Thank you for allowing me to participate in the care of your patient. The patient was connected to triage in response to information provided to the ECC/PSC. Please do not respond through this encounter as the response is not directed to a shared pool.

## 2021-10-21 NOTE — PROGRESS NOTES
Anju Falcon (:  1975) is a 48 y.o. female,Established patient, here for evaluation of the following chief complaint(s):  Back Pain (thinks it may be uti ua)         ASSESSMENT/PLAN:    History of large kidney stones requiring lithotripsy and stenting presenting today with urinary frequency and flank pain. Concern for nephrolithiasis/obstruction. Will get stat CT abdomen pelvis. We will go ahead and prophylactically treat for UTI and await culture. 1. Urinary frequency  -     CT ABDOMEN PELVIS WO CONTRAST Additional Contrast? None; Future  -     BASIC METABOLIC PANEL; Future  -     CBC WITH AUTO DIFFERENTIAL; Future  -     C.trachomatis N.gonorrhoeae DNA, Urine; Future  -     nitrofurantoin, macrocrystal-monohydrate, (MACROBID) 100 MG capsule; Take 1 capsule by mouth 2 times daily for 5 days, Disp-10 capsule, R-0Normal  -     Culture, Urine  2. Flank pain  -     CT ABDOMEN PELVIS WO CONTRAST Additional Contrast? None; Future  -     CBC WITH AUTO DIFFERENTIAL; Future  3. Screen for STD (sexually transmitted disease)  -     HIV-1 AND HIV-2 ANTIBODIES; Future  -     HEPATITIS C ANTIBODY; Future  -     Syphilis Antibody Cascading Reflex; Future      No follow-ups on file. Subjective   SUBJECTIVE/OBJECTIVE:  HPI    2 weeks of lower back pain, urinary frequency, does have history of kidney stones requiring lithootripsy and stent in 2018. Has high pain tolerance, unsure if this feels the same. Was in [address] No dysuria, but pelvic pressure, urinary frequency no hematuria, no fevers, no no nausea vomiting. Denies any vaginal discharge but does want to get testing for STDs today.  was unfaithful about a year ago, was tested back then and testing at that time was negative. Review of Systems   All other systems reviewed and are negative. Objective   Physical Exam  Constitutional:       Appearance: Normal appearance.    HENT:      Head: Normocephalic and atraumatic. Nose: Nose normal.      Mouth/Throat:      Mouth: Mucous membranes are moist.   Eyes:      Extraocular Movements: Extraocular movements intact. Cardiovascular:      Rate and Rhythm: Normal rate and regular rhythm. Heart sounds: Normal heart sounds. No murmur heard. No friction rub. No gallop. Pulmonary:      Effort: Pulmonary effort is normal.      Breath sounds: Normal breath sounds. No wheezing, rhonchi or rales. Musculoskeletal:      Comments: No tenderness palpation and midline spine or paraspinal regions    No CVA tenderness   Skin:     General: Skin is warm. Neurological:      General: No focal deficit present. Mental Status: She is alert. Psychiatric:         Mood and Affect: Mood normal.                  An electronic signature was used to authenticate this note.     --Joanne Dailey MD

## 2021-10-21 NOTE — TELEPHONE ENCOUNTER
Pressure and urinates frequently. Back pain. This has been happening for two and a half weeks. Can she be worked in today? She has had a history of kidney stones.

## 2021-10-22 LAB
ANION GAP SERPL CALCULATED.3IONS-SCNC: 13 MMOL/L (ref 3–16)
BASOPHILS ABSOLUTE: 0.1 K/UL (ref 0–0.2)
BASOPHILS RELATIVE PERCENT: 0.9 %
BUN BLDV-MCNC: 15 MG/DL (ref 7–20)
C. TRACHOMATIS DNA ,URINE: NEGATIVE
CALCIUM SERPL-MCNC: 9.6 MG/DL (ref 8.3–10.6)
CHLORIDE BLD-SCNC: 101 MMOL/L (ref 99–110)
CO2: 24 MMOL/L (ref 21–32)
CREAT SERPL-MCNC: 0.9 MG/DL (ref 0.6–1.1)
EOSINOPHILS ABSOLUTE: 0.1 K/UL (ref 0–0.6)
EOSINOPHILS RELATIVE PERCENT: 1.4 %
GFR AFRICAN AMERICAN: >60
GFR NON-AFRICAN AMERICAN: >60
GLUCOSE BLD-MCNC: 75 MG/DL (ref 70–99)
HCT VFR BLD CALC: 45 % (ref 36–48)
HEMOGLOBIN: 14.6 G/DL (ref 12–16)
HEPATITIS C ANTIBODY INTERPRETATION: NORMAL
HIV AG/AB: NORMAL
HIV ANTIGEN: NORMAL
HIV-1 ANTIBODY: NORMAL
HIV-2 AB: NORMAL
LYMPHOCYTES ABSOLUTE: 2.2 K/UL (ref 1–5.1)
LYMPHOCYTES RELATIVE PERCENT: 30.8 %
MCH RBC QN AUTO: 29.7 PG (ref 26–34)
MCHC RBC AUTO-ENTMCNC: 32.5 G/DL (ref 31–36)
MCV RBC AUTO: 91.4 FL (ref 80–100)
MONOCYTES ABSOLUTE: 0.7 K/UL (ref 0–1.3)
MONOCYTES RELATIVE PERCENT: 9.5 %
N. GONORRHOEAE DNA, URINE: NEGATIVE
NEUTROPHILS ABSOLUTE: 4.1 K/UL (ref 1.7–7.7)
NEUTROPHILS RELATIVE PERCENT: 57.4 %
PDW BLD-RTO: 13.2 % (ref 12.4–15.4)
PLATELET # BLD: 277 K/UL (ref 135–450)
PMV BLD AUTO: 9.5 FL (ref 5–10.5)
POTASSIUM SERPL-SCNC: 4.3 MMOL/L (ref 3.5–5.1)
RBC # BLD: 4.92 M/UL (ref 4–5.2)
SODIUM BLD-SCNC: 138 MMOL/L (ref 136–145)
TOTAL SYPHILLIS IGG/IGM: NORMAL
URINE CULTURE, ROUTINE: NORMAL
WBC # BLD: 7.1 K/UL (ref 4–11)

## 2022-01-24 ENCOUNTER — OFFICE VISIT (OUTPATIENT)
Dept: PRIMARY CARE CLINIC | Age: 47
End: 2022-01-24
Payer: COMMERCIAL

## 2022-01-24 VITALS
TEMPERATURE: 97.7 F | SYSTOLIC BLOOD PRESSURE: 110 MMHG | BODY MASS INDEX: 41.18 KG/M2 | WEIGHT: 232.4 LBS | RESPIRATION RATE: 16 BRPM | DIASTOLIC BLOOD PRESSURE: 62 MMHG | HEIGHT: 63 IN | OXYGEN SATURATION: 98 % | HEART RATE: 63 BPM

## 2022-01-24 DIAGNOSIS — B00.1 RECURRENT COLD SORES: ICD-10-CM

## 2022-01-24 DIAGNOSIS — R30.0 DYSURIA: Primary | ICD-10-CM

## 2022-01-24 DIAGNOSIS — J45.909 UNCOMPLICATED ASTHMA, UNSPECIFIED ASTHMA SEVERITY, UNSPECIFIED WHETHER PERSISTENT: ICD-10-CM

## 2022-01-24 LAB
BILIRUBIN, POC: NORMAL
BLOOD URINE, POC: NORMAL
CLARITY, POC: CLEAR
COLOR, POC: YELLOW
GLUCOSE URINE, POC: NORMAL
KETONES, POC: NORMAL
LEUKOCYTE EST, POC: NORMAL
NITRITE, POC: NORMAL
PH, POC: 6
PROTEIN, POC: NORMAL
SPECIFIC GRAVITY, POC: >=1.03
UROBILINOGEN, POC: 0.2

## 2022-01-24 PROCEDURE — 99214 OFFICE O/P EST MOD 30 MIN: CPT | Performed by: NURSE PRACTITIONER

## 2022-01-24 PROCEDURE — 81002 URINALYSIS NONAUTO W/O SCOPE: CPT | Performed by: NURSE PRACTITIONER

## 2022-01-24 RX ORDER — NITROFURANTOIN 25; 75 MG/1; MG/1
100 CAPSULE ORAL 2 TIMES DAILY
Qty: 20 CAPSULE | Refills: 0 | Status: SHIPPED | OUTPATIENT
Start: 2022-01-24 | End: 2022-02-03

## 2022-01-24 RX ORDER — ALBUTEROL SULFATE 90 UG/1
2 AEROSOL, METERED RESPIRATORY (INHALATION) EVERY 6 HOURS PRN
Qty: 18 G | Refills: 3 | Status: SHIPPED | OUTPATIENT
Start: 2022-01-24 | End: 2022-01-25

## 2022-01-24 RX ORDER — VALACYCLOVIR HYDROCHLORIDE 500 MG/1
TABLET, FILM COATED ORAL
Qty: 30 TABLET | Refills: 0 | Status: SHIPPED | OUTPATIENT
Start: 2022-01-24 | End: 2022-03-07 | Stop reason: SDUPTHER

## 2022-01-24 ASSESSMENT — ENCOUNTER SYMPTOMS
COUGH: 0
SHORTNESS OF BREATH: 0
VOMITING: 0
CHEST TIGHTNESS: 0
COLOR CHANGE: 0
WHEEZING: 0
ABDOMINAL PAIN: 0
DIARRHEA: 0
NAUSEA: 0

## 2022-01-24 NOTE — PROGRESS NOTES
ENCOUNTER DATE: 1/24/2022     NAME: Valencia Driscoll   AGE: 48 y.o. GENDER: female   YOB: 1975    Patient Active Problem List   Diagnosis    Asthma    Flank lipoma    History of Chiari malformation    Polyp of colon    Lung nodules    Abnormal CT scan of lung    History of nephrolithiasis    Recurrent cold sores    Intermittent palpitations    Chronic neck pain      Allergies   Allergen Reactions    Coconut Fatty Acids     Proair Respiclick [Albuterol Sulfate]      Current Outpatient Medications on File Prior to Visit   Medication Sig Dispense Refill    Turmeric (QC TUMERIC COMPLEX PO) Take by mouth      meloxicam (MOBIC) 15 MG tablet Take 15 mg by mouth as needed       Multiple Vitamin (MULTIVITAMIN ADULT PO)       loratadine (CLARITIN) 10 MG tablet Take 10 mg by mouth daily      esomeprazole (NEXIUM) 20 MG delayed release capsule Take 20 mg by mouth every morning (before breakfast)      BIOTIN PO       ZINC OXIDE PO Take by mouth      Ascorbic Acid (VITAMIN C PO) Take by mouth      Cholecalciferol (VITAMIN D3 PO) Take by mouth      Montelukast Sodium (SINGULAIR PO) Take by mouth       No current facility-administered medications on file prior to visit. Social History     Tobacco Use    Smoking status: Never Smoker    Smokeless tobacco: Never Used   Substance Use Topics    Alcohol use: Yes     Comment: very rare      CARE TEAM  Patient Care Team:  JEANNE Carlson CNP as PCP - General (Family Nurse Practitioner)  JEANNE Carlson CNP as PCP - [facility] Empaneled Provider  Mariposa Hackett MD as Surgeon (General Surgery)  Edie Gibson MD as Consulting Physician (Pulmonology)    Chief Complaint   Patient presents with    Urinary Tract Infection     symptoms - pain, pressure, urgency. x 2 days       HPI:   Patient is here for a problem visit. Complains of urinary symptoms since Saturday. Has pressure in bladder area. Some hesitancy.  Some increased frequency and urgency. No flank pain. Urine is cloudy and odorous. More yellow than normal    H/o large kidney stones requiring lithotripsy and stenting. Referred to nephro due to h/o elevated PTH and FH: Brother h/o renal cell carcinoma  Had CT scan. Had similar signs and symptoms in the past and anbx usually help, even when urine dip is clear. Needs refill on valtrex. States previous rx was for valacyclovir 500mg tabs, takes 1,000mg on day 1, followed by 500mg daily for about 3-4 days until clear. Had recent outbreak. ROS:  Review of Systems   Constitutional: Negative for activity change, appetite change, chills, fatigue and fever. Respiratory: Negative for cough, chest tightness, shortness of breath and wheezing. Cardiovascular: Negative for chest pain, palpitations and leg swelling. Gastrointestinal: Negative for abdominal pain, diarrhea, nausea and vomiting. Genitourinary: Positive for difficulty urinating, frequency, pelvic pain and urgency. Negative for dysuria, flank pain, hematuria and vaginal discharge. Musculoskeletal: Negative for myalgias. Skin: Negative for color change, pallor and rash. Neurological: Negative for dizziness, weakness, light-headedness and headaches. Hematological: Negative for adenopathy. VITALS:  /62 (Site: Right Upper Arm, Position: Sitting, Cuff Size: Large Adult)   Pulse 63   Temp 97.7 °F (36.5 °C) (Oral)   Resp 16   Ht 5' 3\" (1.6 m)   Wt 232 lb 6.4 oz (105.4 kg)   SpO2 98%   BMI 41.17 kg/m²      PE:  Physical Exam  Vitals and nursing note reviewed. Constitutional:       General: She is not in acute distress. Appearance: Normal appearance. She is well-developed. She is obese. She is not diaphoretic. Cardiovascular:      Rate and Rhythm: Normal rate and regular rhythm. Heart sounds: Normal heart sounds. No murmur heard. No friction rub. Pulmonary:      Effort: Pulmonary effort is normal. No respiratory distress. Breath sounds: Normal breath sounds. No wheezing, rhonchi or rales. Abdominal:      General: Abdomen is flat. Bowel sounds are normal. There is no distension. Palpations: Abdomen is soft. There is no mass. Tenderness: There is abdominal tenderness (suprapubic, mild). There is no right CVA tenderness, left CVA tenderness, guarding or rebound. Hernia: No hernia is present. Musculoskeletal:      Right lower leg: No edema. Left lower leg: No edema. Skin:     General: Skin is warm and dry. Coloration: Skin is not pale. Findings: No erythema or rash. Neurological:      Mental Status: She is alert and oriented to person, place, and time. Motor: No weakness. Gait: Gait normal.   Psychiatric:         Mood and Affect: Mood normal.         Behavior: Behavior normal.        Results for POC orders placed in visit on 01/24/22   POCT Urinalysis no Micro   Result Value Ref Range    Color, UA yellow     Clarity, UA clear     Glucose, UA POC neg     Bilirubin, UA neg     Ketones, UA neg     Spec Grav, UA >=1.030     Blood, UA POC neg     pH, UA 6.0     Protein, UA POC neg     Urobilinogen, UA 0.2     Leukocytes, UA neg     Nitrite, UA neg      ASSESSMENT/PLAN:  1. Dysuria  Will send urine for culture. Proceed pending results  Start on anbx due to pts history. Increase water intake. - POCT Urinalysis no Micro  - nitrofurantoin, macrocrystal-monohydrate, (MACROBID) 100 MG capsule; Take 1 capsule by mouth 2 times daily for 10 days  Dispense: 20 capsule; Refill: 0  - Culture, Urine    2. Uncomplicated asthma, unspecified asthma severity, unspecified whether persistent  Stable  Addendum: advair changed to HFA per patient request  Ventolin not covered, changed to proair/albuterol HFA  - fluticasone-salmeterol (ADVAIR HFA) 115-21; Inhale 1 puff into the lungs 2 times daily  Dispense: 180 each; Refill: 1  - albuterol sulfate HFA (PROAIR 108 (90 Base) MCG/ACT inhaler;  Inhale 2 puffs into the lungs every 6 hours as needed for Wheezing  Dispense: 18 g; Refill: 3    3. Recurrent cold sores  stable  - valACYclovir (VALTREX) 500 MG tablet; Take 1,000mg on day 1, followed by 500mg daily x 5 days prn  Dispense: 30 tablet; Refill: 0      Return in about 3 months (around 4/24/2022) for physical/labs.      Electronically signed by JEANNE Prado CNP on 1/24/2022 at 2:06 PM

## 2022-01-25 RX ORDER — ALBUTEROL SULFATE 90 UG/1
2 AEROSOL, METERED RESPIRATORY (INHALATION) EVERY 6 HOURS PRN
Qty: 18 G | Refills: 0 | Status: SHIPPED | OUTPATIENT
Start: 2022-01-25

## 2022-01-26 LAB — URINE CULTURE, ROUTINE: NORMAL

## 2022-02-09 ENCOUNTER — HOSPITAL ENCOUNTER (OUTPATIENT)
Dept: CT IMAGING | Age: 47
Discharge: HOME | End: 2022-02-09
Payer: COMMERCIAL

## 2022-02-09 DIAGNOSIS — R91.8 PULMONARY NODULES: ICD-10-CM

## 2022-02-09 PROCEDURE — 71250 CT THORAX DX C-: CPT

## 2022-02-21 ENCOUNTER — TELEPHONE (OUTPATIENT)
Dept: PULMONOLOGY | Age: 47
End: 2022-02-21

## 2022-02-21 NOTE — TELEPHONE ENCOUNTER
Rosalba Albarado has an appt on March 9th but has to go out of town for work from March 7 - Oct ?  She wants to know if you need to see before she leaves, see is coming on for CT scan f/u or would you be able to call her with results? CT scan was completed on 02/09/22.

## 2022-03-04 DIAGNOSIS — B00.1 RECURRENT COLD SORES: ICD-10-CM

## 2022-03-04 NOTE — TELEPHONE ENCOUNTER
Medication:   Requested Prescriptions     Pending Prescriptions Disp Refills    valACYclovir (VALTREX) 500 MG tablet 30 tablet 0     Sig: Take 1,000mg on day 1, followed by 500mg daily x 5 days prn     Last Filled: 1.24.22    Last appt: 1/24/2022   Next appt: Visit date not found    Last OARRS: No flowsheet data found.

## 2022-03-04 NOTE — TELEPHONE ENCOUNTER
----- Message from Dereckshelby Ibarra sent at 3/4/2022  2:14 PM EST -----  Subject: Message to Provider    QUESTIONS  Information for Provider? I Sent in a prescription for the patient the   valACYclovir (VALTREX) 500 MG tablet stated she needed 2 500mg tablet for   the first day please advise patient stated that after request   ---------------------------------------------------------------------------  --------------  CALL BACK INFO  What is the best way for the office to contact you? OK to leave message on   voicemail, OK to respond with electronic message via [employer] portal (only   for patients who have registered [employer] account)  Preferred Call Back Phone Number? [phone]  ---------------------------------------------------------------------------  --------------  SCRIPT ANSWERS  Relationship to Patient?  Self

## 2022-03-07 RX ORDER — VALACYCLOVIR HYDROCHLORIDE 500 MG/1
TABLET, FILM COATED ORAL
Qty: 30 TABLET | Refills: 2 | Status: SHIPPED | OUTPATIENT
Start: 2022-03-07

## 2022-03-09 ENCOUNTER — OFFICE VISIT (OUTPATIENT)
Dept: PRIMARY CARE CLINIC | Age: 47
End: 2022-03-09
Payer: COMMERCIAL

## 2022-03-09 ENCOUNTER — OFFICE VISIT (OUTPATIENT)
Dept: PULMONOLOGY | Age: 47
End: 2022-03-09
Payer: COMMERCIAL

## 2022-03-09 VITALS
HEART RATE: 60 BPM | BODY MASS INDEX: 40.75 KG/M2 | TEMPERATURE: 97.8 F | HEIGHT: 63 IN | WEIGHT: 230 LBS | OXYGEN SATURATION: 97 % | SYSTOLIC BLOOD PRESSURE: 110 MMHG | DIASTOLIC BLOOD PRESSURE: 78 MMHG

## 2022-03-09 VITALS
HEIGHT: 63 IN | WEIGHT: 231 LBS | DIASTOLIC BLOOD PRESSURE: 70 MMHG | BODY MASS INDEX: 40.93 KG/M2 | SYSTOLIC BLOOD PRESSURE: 114 MMHG | TEMPERATURE: 96.6 F | OXYGEN SATURATION: 99 % | HEART RATE: 86 BPM

## 2022-03-09 DIAGNOSIS — R91.1 PULMONARY NODULE: Primary | ICD-10-CM

## 2022-03-09 DIAGNOSIS — E66.01 CLASS 3 SEVERE OBESITY WITHOUT SERIOUS COMORBIDITY WITH BODY MASS INDEX (BMI) OF 40.0 TO 44.9 IN ADULT, UNSPECIFIED OBESITY TYPE (HCC): ICD-10-CM

## 2022-03-09 DIAGNOSIS — K63.5 POLYP OF COLON, UNSPECIFIED PART OF COLON, UNSPECIFIED TYPE: ICD-10-CM

## 2022-03-09 DIAGNOSIS — Z13.220 SCREENING FOR HYPERLIPIDEMIA: ICD-10-CM

## 2022-03-09 DIAGNOSIS — K21.9 GASTROESOPHAGEAL REFLUX DISEASE WITHOUT ESOPHAGITIS: ICD-10-CM

## 2022-03-09 DIAGNOSIS — J45.30 MILD PERSISTENT ASTHMA WITHOUT COMPLICATION: ICD-10-CM

## 2022-03-09 DIAGNOSIS — R91.8 LUNG NODULES: ICD-10-CM

## 2022-03-09 DIAGNOSIS — E66.01 CLASS 3 SEVERE OBESITY WITHOUT SERIOUS COMORBIDITY WITH BODY MASS INDEX (BMI) OF 40.0 TO 44.9 IN ADULT, UNSPECIFIED OBESITY TYPE (HCC): Primary | ICD-10-CM

## 2022-03-09 PROBLEM — E66.813 CLASS 3 SEVERE OBESITY WITHOUT SERIOUS COMORBIDITY WITH BODY MASS INDEX (BMI) OF 40.0 TO 44.9 IN ADULT: Status: ACTIVE | Noted: 2022-03-09

## 2022-03-09 LAB
A/G RATIO: 1.9 (ref 1.1–2.2)
ALBUMIN SERPL-MCNC: 4.4 G/DL (ref 3.4–5)
ALP BLD-CCNC: 52 U/L (ref 40–129)
ALT SERPL-CCNC: 16 U/L (ref 10–40)
ANION GAP SERPL CALCULATED.3IONS-SCNC: 12 MMOL/L (ref 3–16)
AST SERPL-CCNC: 15 U/L (ref 15–37)
BILIRUB SERPL-MCNC: 0.4 MG/DL (ref 0–1)
BUN BLDV-MCNC: 12 MG/DL (ref 7–20)
CALCIUM SERPL-MCNC: 9.5 MG/DL (ref 8.3–10.6)
CHLORIDE BLD-SCNC: 102 MMOL/L (ref 99–110)
CHOLESTEROL, TOTAL: 229 MG/DL (ref 0–199)
CO2: 24 MMOL/L (ref 21–32)
CREAT SERPL-MCNC: 0.7 MG/DL (ref 0.6–1.1)
GFR AFRICAN AMERICAN: >60
GFR NON-AFRICAN AMERICAN: >60
GLUCOSE BLD-MCNC: 83 MG/DL (ref 70–99)
HCT VFR BLD CALC: 43.2 % (ref 36–48)
HDLC SERPL-MCNC: 49 MG/DL (ref 40–60)
HEMOGLOBIN: 14.3 G/DL (ref 12–16)
LDL CHOLESTEROL CALCULATED: 154 MG/DL
MCH RBC QN AUTO: 30 PG (ref 26–34)
MCHC RBC AUTO-ENTMCNC: 33.1 G/DL (ref 31–36)
MCV RBC AUTO: 90.7 FL (ref 80–100)
PDW BLD-RTO: 13 % (ref 12.4–15.4)
PLATELET # BLD: 342 K/UL (ref 135–450)
PMV BLD AUTO: 8.6 FL (ref 5–10.5)
POTASSIUM SERPL-SCNC: 4.3 MMOL/L (ref 3.5–5.1)
RBC # BLD: 4.76 M/UL (ref 4–5.2)
SODIUM BLD-SCNC: 138 MMOL/L (ref 136–145)
TOTAL PROTEIN: 6.7 G/DL (ref 6.4–8.2)
TRIGL SERPL-MCNC: 130 MG/DL (ref 0–150)
VLDLC SERPL CALC-MCNC: 26 MG/DL
WBC # BLD: 5.6 K/UL (ref 4–11)

## 2022-03-09 PROCEDURE — 99214 OFFICE O/P EST MOD 30 MIN: CPT | Performed by: NURSE PRACTITIONER

## 2022-03-09 PROCEDURE — 99213 OFFICE O/P EST LOW 20 MIN: CPT | Performed by: INTERNAL MEDICINE

## 2022-03-09 RX ORDER — ESOMEPRAZOLE MAGNESIUM 40 MG/1
40 FOR SUSPENSION ORAL DAILY
COMMUNITY

## 2022-03-09 ASSESSMENT — PATIENT HEALTH QUESTIONNAIRE - PHQ9
2. FEELING DOWN, DEPRESSED OR HOPELESS: 0
7. TROUBLE CONCENTRATING ON THINGS, SUCH AS READING THE NEWSPAPER OR WATCHING TELEVISION: 0
1. LITTLE INTEREST OR PLEASURE IN DOING THINGS: 0
SUM OF ALL RESPONSES TO PHQ QUESTIONS 1-9: 0
3. TROUBLE FALLING OR STAYING ASLEEP: 0
SUM OF ALL RESPONSES TO PHQ QUESTIONS 1-9: 0
SUM OF ALL RESPONSES TO PHQ9 QUESTIONS 1 & 2: 0
SUM OF ALL RESPONSES TO PHQ QUESTIONS 1-9: 0
5. POOR APPETITE OR OVEREATING: 0
8. MOVING OR SPEAKING SO SLOWLY THAT OTHER PEOPLE COULD HAVE NOTICED. OR THE OPPOSITE, BEING SO FIGETY OR RESTLESS THAT YOU HAVE BEEN MOVING AROUND A LOT MORE THAN USUAL: 0
SUM OF ALL RESPONSES TO PHQ QUESTIONS 1-9: 0
4. FEELING TIRED OR HAVING LITTLE ENERGY: 0
6. FEELING BAD ABOUT YOURSELF - OR THAT YOU ARE A FAILURE OR HAVE LET YOURSELF OR YOUR FAMILY DOWN: 0
9. THOUGHTS THAT YOU WOULD BE BETTER OFF DEAD, OR OF HURTING YOURSELF: 0

## 2022-03-09 ASSESSMENT — ENCOUNTER SYMPTOMS
DIARRHEA: 0
ABDOMINAL DISTENTION: 0
ABDOMINAL PAIN: 0
WHEEZING: 0
TROUBLE SWALLOWING: 1
VOMITING: 0
CHEST TIGHTNESS: 0
BACK PAIN: 1
CONSTIPATION: 1
COLOR CHANGE: 0
SORE THROAT: 0
BLOOD IN STOOL: 0
COUGH: 0
SHORTNESS OF BREATH: 0
NAUSEA: 0

## 2022-03-09 NOTE — PROGRESS NOTES
ENCOUNTER DATE: 3/9/2022     NAME: Swati Busby   AGE: 50 y.o. GENDER: female   YOB: 1975    Patient Active Problem List   Diagnosis    Asthma    Flank lipoma    History of Chiari malformation    Polyp of colon    Lung nodules    Abnormal CT scan of lung    History of nephrolithiasis    Recurrent cold sores    Intermittent palpitations    Chronic neck pain    Class 3 severe obesity without serious comorbidity with body mass index (BMI) of 40.0 to 44.9 in adult (HCC)    Gastroesophageal reflux disease without esophagitis      Allergies   Allergen Reactions    Coconut Fatty Acids     Proair Respiclick [Albuterol Sulfate]      Current Outpatient Medications on File Prior to Visit   Medication Sig Dispense Refill    esomeprazole Magnesium (NEXIUM) 40 MG PACK Take 40 mg by mouth daily      valACYclovir (VALTREX) 500 MG tablet Take 1,000mg on day 1, followed by 500mg daily x 5 days prn for outbreak 30 tablet 2    fluticasone-salmeterol (ADVAIR HFA) 115-21 MCG/ACT inhaler Inhale 2 puffs into the lungs 2 times daily 12 g 1    albuterol sulfate HFA (PROAIR HFA) 108 (90 Base) MCG/ACT inhaler Inhale 2 puffs into the lungs every 6 hours as needed for Wheezing 18 g 0    Turmeric (QC TUMERIC COMPLEX PO) Take by mouth      Multiple Vitamin (MULTIVITAMIN ADULT PO)       loratadine (CLARITIN) 10 MG tablet Take 10 mg by mouth daily      BIOTIN PO       ZINC OXIDE PO Take by mouth      Ascorbic Acid (VITAMIN C PO) Take by mouth      Cholecalciferol (VITAMIN D3 PO) Take by mouth      Montelukast Sodium (SINGULAIR PO) Take by mouth       No current facility-administered medications on file prior to visit.         Social History     Tobacco Use    Smoking status: Never Smoker    Smokeless tobacco: Never Used   Substance Use Topics    Alcohol use: Yes     Comment: very rare      CARE TEAM  Patient Care Team:  JEANNE Youssef - CNP as PCP - General (Family Nurse Practitioner)  JEANNE Land - CNP as PCP - [facility] Empaneled Provider  Kirstie Souza MD as Surgeon (General Surgery)  Chapincito Chou MD as Consulting Physician (Pulmonology)    Chief Complaint   Patient presents with    Fatigue    Gastroesophageal Reflux     saw  yanni taylor   this morning      HPI:   Patient is here for a chronic visit. PULM:  Has apt today to follow up on pulmonary noduled  Had recent CT  On advair, singulair for asthma. Only takes these during allergy season. Uses rescue inhaler prn, which is not often    GERD:  Was taking nexium occasionally. Reflux worsening. Having some constipation. Taking stool softeners bid, miralax prn. Having abd pain and bloating. Had EGD with last cscope approx 3 years ago  H/o esophageal dilation   Dx with tubular adenomas in the past and was told to have cscope every 3yrs  Had GI apt this am. Told to take nexium regularly. Due for cscope, but she refuses to do pre screening covid test.     WT:  Having trouble loosing wt. Referred to wt management by GI  Has tried keto and multiple diets, exercise, going to the gym. Drinks a lot of water. Was able to lose 30 lbs in 6mo when she was very strict. Right now diet is off   Started going back to the gym. Feels very tired after eating, and some chills. Wants labs checked. Feels like something is related to her thyroid. Has hormone and thyroid orders from gyn. After eating, body will feel cold, chills and feels extreme fatigue and feels jittery. +FH thyroid disease. +FH DMII (father)    NEPHRO  Referred due to h/o kidney stones, elevated PTH. No apt scheduled. +FH renal cell carcinoma (brother)    GYN:  Has apt scheduled. Will have pellet therapy soon. Has orders to check hormone and thyroid levels per gyn    ROS:  Review of Systems   Constitutional: Positive for fatigue. Negative for activity change, appetite change, chills and unexpected weight change.    HENT: Positive for trouble swallowing. Negative for congestion, ear pain, hearing loss, nosebleeds, postnasal drip, sneezing and sore throat. Respiratory: Negative for cough, chest tightness, shortness of breath and wheezing. H/o asthma, lung nodules, follows with pulm   Cardiovascular: Negative for chest pain, palpitations (has LOOP recorder) and leg swelling. Gastrointestinal: Positive for constipation. Negative for abdominal distention, abdominal pain, blood in stool, diarrhea, nausea and vomiting. H/o esophageal stricture, colon polyps   Endocrine: Positive for cold intolerance. Genitourinary: Negative for difficulty urinating, dysuria, flank pain, hematuria and pelvic pain. H/o kidney stones   Musculoskeletal: Positive for arthralgias (chronic neck/back pain) and back pain. Negative for neck pain. Skin: Negative for color change, pallor and rash. Neurological: Negative for dizziness, tremors, numbness and headaches. Hematological: Does not bruise/bleed easily. Psychiatric/Behavioral: Negative for agitation and sleep disturbance. The patient is not nervous/anxious. VITALS:  /78   Pulse 60   Temp 97.8 °F (36.6 °C) (Oral)   Ht 5' 3\" (1.6 m)   Wt 230 lb (104.3 kg)   SpO2 97%   BMI 40.74 kg/m²    Wt Readings from Last 3 Encounters:   03/09/22 230 lb (104.3 kg)   01/24/22 232 lb 6.4 oz (105.4 kg)   10/21/21 227 lb 12.8 oz (103.3 kg)       PE:  Physical Exam  Vitals and nursing note reviewed. Constitutional:       General: She is not in acute distress. Appearance: Normal appearance. She is well-developed and normal weight. She is not diaphoretic. Neck:      Thyroid: No thyromegaly. Vascular: No carotid bruit or JVD. Trachea: No tracheal deviation. Cardiovascular:      Rate and Rhythm: Normal rate and regular rhythm. Heart sounds: Normal heart sounds. No murmur heard. No friction rub.    Pulmonary:      Effort: Pulmonary effort is normal. No respiratory distress. Breath sounds: Normal breath sounds. No stridor. No wheezing, rhonchi or rales. Abdominal:      General: Abdomen is flat. There is no distension. Palpations: Abdomen is soft. Musculoskeletal:      Cervical back: Neck supple. Skin:     General: Skin is warm and dry. Coloration: Skin is not pale. Findings: No erythema or rash. Neurological:      General: No focal deficit present. Mental Status: She is alert and oriented to person, place, and time. Motor: No weakness. Gait: Gait normal.   Psychiatric:         Mood and Affect: Mood normal.         Behavior: Behavior normal.         Thought Content: Thought content normal.         Judgment: Judgment normal.        CHEST CT 2/9/22  Impression       Stable pulmonary nodularity, including a dominant 10 x 7 mm nodule of the superior segment of the right lower lobe.       No new or progressive pulmonary nodularity       ASSESSMENT/PLAN:  1. Class 3 severe obesity without serious comorbidity with body mass index (BMI) of 40.0 to 44.9 in adult, unspecified obesity type ([facility] [address]  Check fasting labs today. Proceed pending results  Will have thyroid labs per gyn at a later date  Has been referred to wt loss program per GI  Discussed future referral to endo for further workup of thyroid or ?IR. Will call if she wants to proceed with endo referral after gyn workup complete.   - CBC; Future  - Comprehensive Metabolic Panel; Future  - Lipid Panel; Future  - Hemoglobin A1C; Future    2. Screening for hyperlipidemia  - CBC; Future  - Comprehensive Metabolic Panel; Future  - Lipid Panel; Future    3. Lung nodules  Continue per pulm   Notes and recent CT reviewed. 4. Mild persistent asthma without complication  Continue per pulm  Stable. Uses regimen only prn.     5. Polyp of colon, unspecified part of colon, unspecified type  GI notes reviewed. Recommended to proceed with cscope.  Patient refuses covid screen, therefore she can't schedule procedure. 6. Gastroesophageal reflux disease without esophagitis  Will start nexium daily per GI. Return in about 3 months (around 6/9/2022) for chronic visit.      Electronically signed by JEANNE Sim CNP on 3/9/2022 at 12:13 PM

## 2022-03-09 NOTE — PROGRESS NOTES
[facility] Pulmonary and Critical Care    Outpatient Follow Up Note    Subjective:   CHIEF COMPLAINT / HPI:     The patient is 50 y.o. female who is here for follow-up of right lower lobe pulmonary nodule. No clinical change since I saw her in August 8/17/2021  Lola Mckeon is here for follow-up of multiple right lower lobe pulmonary nodules. Overall she offers no complaints and denies any fevers, chills, cough, anorexia, dyspnea exertion, wheezing, or chest pain    5/18/2021  Citlalli Neither is here for follow-up of abnormal CT chest.  She is having no dyspnea on exertion, cough, wheezing, chest pain, fevers, chills, anorexia, or weight loss    4/21/2021  Citlalli Neither presents today for a new patient visit for abnormal CT chest.  She was concerned that she had a mass on her posterior flank. She saw surgery who was unable to appreciate it so they ordered a CT chest.  There is no subcutaneous mass but she did have diffuse groundglass with several right lower lobe pulmonary nodules largest measuring 1.2 cm. Approximately 4 weeks prior to her CT chest she was diagnosed with Covid 19. She was hospitalized in [place] from January 18 January 25. She was treated with remdesivir, dexamethasone, oxygen up to 7 L, and self proned. She did not need the ICU or high flow oxygen. Currently her dyspnea on exertion is improved from when she had Covid but still has some shortness of breath when she walks at a fast pace. She has no cough, wheezing, or chest tightness. She has no fever or anorexia. . She initially lost weight with COVID-19 but has gained it all back. She has no personal history of malignancy but states that she has had colon polyps and ovarian polyps    Past Medical History:    Past Medical History:   Diagnosis Date    Asthma     Chronic back pain     Colon polyp     tubular adenoma    Osteoarthritis     Palpitations        Social History:    Patient is . No history of tobacco use.   She works in survey    Family History:  DM   Atrial fibrillation  COPD  TB    Current Medications:  Current Outpatient Medications on File Prior to Visit   Medication Sig Dispense Refill    valACYclovir (VALTREX) 500 MG tablet Take 1,000mg on day 1, followed by 500mg daily x 5 days prn for outbreak 30 tablet 2    fluticasone-salmeterol (ADVAIR HFA) 115-21 MCG/ACT inhaler Inhale 2 puffs into the lungs 2 times daily 12 g 1    albuterol sulfate HFA (PROAIR HFA) 108 (90 Base) MCG/ACT inhaler Inhale 2 puffs into the lungs every 6 hours as needed for Wheezing 18 g 0    Turmeric (QC TUMERIC COMPLEX PO) Take by mouth      Multiple Vitamin (MULTIVITAMIN ADULT PO)       loratadine (CLARITIN) 10 MG tablet Take 10 mg by mouth daily      BIOTIN PO       ZINC OXIDE PO Take by mouth      Ascorbic Acid (VITAMIN C PO) Take by mouth      Cholecalciferol (VITAMIN D3 PO) Take by mouth      Montelukast Sodium (SINGULAIR PO) Take by mouth       No current facility-administered medications on file prior to visit.        REVIEW OF SYSTEMS:    CONSTITUTIONAL: Negative for fevers and chills  HEENT: Negative for oropharyngeal exudate, post nasal drip, sinus pain / pressure, nasal congestion, ear pain  RESPIRATORY:  See HPI  CARDIOVASCULAR: Negative for chest pain, palpitations, edema  GASTROINTESTINAL: Negative for nausea, vomiting, diarrhea, constipation and abdominal pain  GENITOURINARY: Negative for dysuria, urinary frequency, urinary hesitancy  HEMATOLOGICAL: Negative for adenopathy  SKIN: Negative for clubbing, cyanosis, skin lesions  ENDOCRINE: Negative for polyuria, polydipsia, heat intolerance, cold intolerance   EXTREMITIES: Negative for weakness or decreased ROM in all extremities  NEUROLOGICAL: Negative for unilateral weakness, speech or gait abnormalities    Objective:   PHYSICAL EXAM:        VITALS:  /70 (Site: Right Upper Arm, Position: Sitting, Cuff Size: Large Adult)   Pulse 86   Temp 96.6 °F (35.9 °C) (Infrared)   Ht 5' 3\" (1.6 m)   Wt 231 lb (104.8 kg)   SpO2 99% Comment: ra  Breastfeeding No   BMI 40.92 kg/m²     CONSTITUTIONAL:  Awake, alert, cooperative, no apparent distress, and appears stated age  [de-identified]: No oropharyngeal exudate, PERRL, no cervical adenopathy, no tracheal deviation, thyroid size normal  LUNGS:  No increased work of breathing and clear to auscultation, no crackles or wheezing  CARDIOVASCULAR:  normal S1 and S2 and no JVD  ABDOMEN:  Normal bowel sounds, non-distended and non-tender to palpation  EXT: No edema, no calf tenderness. Pulses are present bilaterally. NEUROLOGIC:  Mental Status Exam:  Level of Alertness:   awake  Orientation:   person, place, time. SKIN:  normal skin color, texture, turgor, no redness, warmth, or swelling     DATA:      Radiology Review:  Pertinent images / reports were reviewed as a part of this visit. CT chest February 9, 2022  Impression       Stable pulmonary nodularity, including a dominant 10 x 7 mm nodule of the superior segment of the right lower lobe.       No new or progressive pulmonary nodularity.             CT chest July 30, 2021  FINDINGS:       AIRWAYS: The central airways are patent.       LUNGS: No consolidation or residual ground glass attenuation.       NODULES: Unchanged nodular density in the superior segment right lower lobe measuring 10 x 7 mm (series 4, image 66). There are other scattered smaller satellite nodules in this region. Unchanged 2 mm nodule in the lingula (image 81). No new or enlarging    nodules.       PLEURA: Normal.       HEART / GREAT VESSELS: Heart is not enlarged. .       LYMPH NODES: No lymphadenopathy.       CHEST WALL: Implanted cardiac monitor device in the left anterior chest wall.       BONES: No destructive osseous process.       UPPER ABDOMEN: Prior cholecystectomy.               Impression   Stable 10 x 7 mm nodule in the superior 7 right lower lobe with scattered other smaller pulmonary nodules.         CT chest May 13, 2021    FINDINGS:       Lungs and Airways: Interval improved appearance of the diffuse groundglass opacities previously noted. Faint groundglass opacities of the lung bases predominantly persists. Pulmonary nodules of the superior segment of the right lower lobe are again    identified, the largest of which measures 1.0 x 0.9 cm, image 37 of series 601. Additional smaller, subcentimeter regional nodules are noted. There is no new or progressive pulmonary nodularity.       Pleura: No pleural effusions or pleural thickening.       Heart and Great Vessels: Heart size is normal.       Adenopathy: None.       Chest Wall / Lower Neck: No significant abnormality.       Upper Abdomen: Surgical absence of the gallbladder.       Bones: No significant abnormality.           Impression       Essentially stable pulmonary nodules, the largest nodule of the superior segment of the right lower lobe measuring up to 1.0 cm. This is stable from February 2021. No more remote prior studies are available. At a minimum, continued imaging surveillance    is recommended.       Interval improved appearance of multiple groundglass pulmonary opacities     CT chest 2/17/2021 reveals the following:  FINDINGS:   Mediastinum: Coronary artery calcifications are a marker of atherosclerosis. There are no enlarged thoracic lymph nodes.       Lungs/pleura:  The tracheobronchial tree is patent. Ahmed Ship is no pneumothorax   or pleural effusion.       Throughout the bilateral lungs, there is nonspecific ground-glass opacities   favoring infectious/inflammatory process.  In addition, there are multiple   solid pulmonary nodules within the superior segment of the right lower lobe,   the largest of which measures 0.9 x 1.2 cm.       Upper Abdomen: Status post cholecystectomy.       Soft Tissues/Bones: Degenerative changes involve the thoracic spine.  There   is no fluid collection, soft tissue or fat mass in the left paracentral back   at the indicated marker.  A cardiac digital recorder device is present within   the left breast.           Impression   1. No superficial mass or fluid collection within the left back. 2. Nonspecific bilateral ground-glass opacities favoring   infectious/inflammatory process including atypical viral pneumonia. 3. Multiple solid right lower lobe pulmonary nodules. Last PFTs:  None on file    Assessment:      Diagnosis Orders   1. Pulmonary nodule  CT CHEST LOW DOSE (LDCT)       Plan:     CT chest was reviewed with [facility]  Right lower lobe pulmonary nodule is unchanged. Repeat low-dose CT chest in 12 months and follow-up with me after that.   If nodule is unchanged at that point and no further surveillance needed

## 2022-03-10 LAB
ESTIMATED AVERAGE GLUCOSE: 105.4 MG/DL
HBA1C MFR BLD: 5.3 %

## 2022-04-08 ENCOUNTER — E-VISIT (OUTPATIENT)
Dept: PRIMARY CARE CLINIC | Age: 47
End: 2022-04-08
Payer: COMMERCIAL

## 2022-04-08 DIAGNOSIS — J01.90 ACUTE NON-RECURRENT SINUSITIS, UNSPECIFIED LOCATION: Primary | ICD-10-CM

## 2022-04-08 PROCEDURE — 99421 OL DIG E/M SVC 5-10 MIN: CPT | Performed by: NURSE PRACTITIONER

## 2022-04-08 RX ORDER — AZITHROMYCIN 250 MG/1
TABLET, FILM COATED ORAL
Qty: 1 PACKET | Refills: 0 | Status: SHIPPED | OUTPATIENT
Start: 2022-04-08 | End: 2022-10-04 | Stop reason: CLARIF

## 2022-04-08 RX ORDER — METHYLPREDNISOLONE 4 MG/1
TABLET ORAL
Qty: 1 KIT | Refills: 0 | Status: SHIPPED | OUTPATIENT
Start: 2022-04-08 | End: 2022-10-04 | Stop reason: CLARIF

## 2022-04-08 ASSESSMENT — LIFESTYLE VARIABLES: SMOKING_STATUS: NO, I'VE NEVER SMOKED

## 2022-04-08 NOTE — PROGRESS NOTES
HPI: per patient questionnaire  ROS: per patient questionnaire  PE: n/a  Dx:    Diagnosis Orders   1. Acute non-recurrent sinusitis, unspecified location  azithromycin (ZITHROMAX) 250 MG tablet    methylPREDNISolone (MEDROL, AISHA,) 4 MG tablet     5-10 minutes were spent on the digital evaluation and management of this patient.

## 2022-04-11 ENCOUNTER — HOSPITAL ENCOUNTER (OUTPATIENT)
Dept: SLEEP CENTER | Age: 47
Discharge: HOME | End: 2022-04-13
Payer: COMMERCIAL

## 2022-04-11 DIAGNOSIS — Z99.89 OSA ON CPAP: ICD-10-CM

## 2022-04-11 DIAGNOSIS — G47.33 OSA ON CPAP: ICD-10-CM

## 2022-04-11 PROCEDURE — 95806 SLEEP STUDY UNATT&RESP EFFT: CPT

## 2022-04-13 ENCOUNTER — TELEPHONE (OUTPATIENT)
Dept: PULMONOLOGY | Age: 47
End: 2022-04-13

## 2022-10-04 ENCOUNTER — TELEMEDICINE (OUTPATIENT)
Dept: PRIMARY CARE CLINIC | Age: 47
End: 2022-10-04
Payer: COMMERCIAL

## 2022-10-04 ENCOUNTER — TELEPHONE (OUTPATIENT)
Dept: PRIMARY CARE CLINIC | Age: 47
End: 2022-10-04

## 2022-10-04 DIAGNOSIS — J45.30 MILD PERSISTENT ASTHMA WITHOUT COMPLICATION: ICD-10-CM

## 2022-10-04 DIAGNOSIS — J01.90 ACUTE NON-RECURRENT SINUSITIS, UNSPECIFIED LOCATION: Primary | ICD-10-CM

## 2022-10-04 PROCEDURE — 99213 OFFICE O/P EST LOW 20 MIN: CPT | Performed by: NURSE PRACTITIONER

## 2022-10-04 RX ORDER — FLUTICASONE PROPIONATE 50 MCG
2 SPRAY, SUSPENSION (ML) NASAL DAILY
Qty: 16 G | Refills: 1 | Status: SHIPPED | OUTPATIENT
Start: 2022-10-04

## 2022-10-04 RX ORDER — AZITHROMYCIN 250 MG/1
TABLET, FILM COATED ORAL
Qty: 1 PACKET | Refills: 0 | Status: SHIPPED | OUTPATIENT
Start: 2022-10-04 | End: 2022-10-31

## 2022-10-04 RX ORDER — GUAIFENESIN 600 MG/1
600 TABLET, EXTENDED RELEASE ORAL 2 TIMES DAILY
Qty: 30 TABLET | Refills: 0 | Status: SHIPPED | OUTPATIENT
Start: 2022-10-04 | End: 2022-10-19

## 2022-10-04 ASSESSMENT — ENCOUNTER SYMPTOMS
BACK PAIN: 1
WHEEZING: 0
SINUS PAIN: 1
CONSTIPATION: 1
SINUS PRESSURE: 1
COLOR CHANGE: 0
BLOOD IN STOOL: 0
VOMITING: 0
TROUBLE SWALLOWING: 1
COUGH: 0
ABDOMINAL PAIN: 0
SHORTNESS OF BREATH: 0
DIARRHEA: 0
CHEST TIGHTNESS: 0
ABDOMINAL DISTENTION: 0
SORE THROAT: 0
NAUSEA: 0

## 2022-10-04 NOTE — PROGRESS NOTES
10/4/2022    TELEHEALTH EVALUATION -- Audio/Visual (During KKHQM-10 public health emergency)    HPI:    Destin Marcus (:  1975) has requested an audio/video evaluation for the following concern(s):    Patient seen virtually for a sick visit    Onset: 2 weeks ago  Complains of sinus pressure. Ears have pressure. Seems to be worsening  Drainage is thick and sticky. Not much drainage coming out. No cough. No shortness of breath. No wheezing. O2 99%    Taking claritin. Was taking claritin D    H/o asthma  Follows with pulm  On advair, singulair for asthma. Only takes these during allergy season (November and march)  Uses rescue inhaler prn,    Review of Systems   Constitutional:  Positive for fatigue. Negative for activity change, appetite change, chills and unexpected weight change. HENT:  Positive for congestion, postnasal drip, sinus pressure, sinus pain and trouble swallowing. Negative for ear pain, hearing loss, nosebleeds, sneezing and sore throat. Respiratory:  Negative for cough, chest tightness, shortness of breath and wheezing. H/o asthma, lung nodules, follows with pulm   Cardiovascular:  Negative for chest pain, palpitations (has LOOP recorder) and leg swelling. Gastrointestinal:  Positive for constipation. Negative for abdominal distention, abdominal pain, blood in stool, diarrhea, nausea and vomiting. H/o esophageal stricture, colon polyps   Endocrine: Positive for cold intolerance. Genitourinary:  Negative for difficulty urinating, dysuria, flank pain, hematuria and pelvic pain. H/o kidney stones   Musculoskeletal:  Positive for arthralgias (chronic neck/back pain) and back pain. Negative for neck pain. Skin:  Negative for color change, pallor and rash. Neurological:  Negative for dizziness, tremors, numbness and headaches. Hematological:  Does not bruise/bleed easily. Psychiatric/Behavioral:  Negative for agitation and sleep disturbance.  The patient is not nervous/anxious. Prior to Visit Medications    Medication Sig Taking?  Authorizing Provider   fluticasone (FLONASE) 50 MCG/ACT nasal spray 2 sprays by Nasal route daily Yes JEANNE Bates CNP   azithromycin (ZITHROMAX) 250 MG tablet Take 2 tabs PO (500mg) on day 1, then 1 tab daily days 2-5 Yes JEANNE Bates CNP   guaiFENesin (MUCINEX) 600 MG extended release tablet Take 1 tablet by mouth 2 times daily for 15 days Yes JEANNE Acevedo CNP   esomeprazole Magnesium (NEXIUM) 40 MG PACK Take 40 mg by mouth daily Yes Historical Provider, MD   valACYclovir (VALTREX) 500 MG tablet Take 1,000mg on day 1, followed by 500mg daily x 5 days prn for outbreak Yes [employer], APRN - CNP   fluticasone-salmeterol (ADVAIR HFA) 115-21 MCG/ACT inhaler Inhale 2 puffs into the lungs 2 times daily Yes JEANNE Bates CNP   albuterol sulfate HFA (PROAIR HFA) 108 (90 Base) MCG/ACT inhaler Inhale 2 puffs into the lungs every 6 hours as needed for Wheezing Yes JEANNE Acevedo CNP   Turmeric (QC TUMERIC COMPLEX PO) Take by mouth Yes Historical Provider, MD   Multiple Vitamin (MULTIVITAMIN ADULT PO)  Yes Historical Provider, MD   loratadine (CLARITIN) 10 MG tablet Take 10 mg by mouth daily Yes Historical Provider, MD   ZINC OXIDE PO Take by mouth Yes Historical Provider, MD   Ascorbic Acid (VITAMIN C PO) Take by mouth Yes Historical Provider, MD   Cholecalciferol (VITAMIN D3 PO) Take by mouth Yes Historical Provider, MD   BIOTIN PO   Historical Provider, MD   Montelukast Sodium (SINGULAIR PO) Take by mouth  Patient not taking: Reported on 10/4/2022  Historical Provider, MD       Social History     Tobacco Use    Smoking status: Never    Smokeless tobacco: Never   Vaping Use    Vaping Use: Never used   Substance Use Topics    Alcohol use: Yes     Comment: very rare    Drug use: Never        Allergies   Allergen Reactions    Coconut Fatty Acids     Proair Respiclick [Albuterol Sulfate]    ,   Past Medical History:   Diagnosis Date    Asthma     Chronic back pain     Colon polyp     tubular adenoma    Osteoarthritis     Palpitations        PHYSICAL EXAMINATION:  [ INSTRUCTIONS:  \"[x]\" Indicates a positive item  \"[]\" Indicates a negative item  -- DELETE ALL ITEMS NOT EXAMINED]  Vital Signs: (As obtained by patient/caregiver or practitioner observation)    Blood pressure-  Heart rate-    Respiratory rate-    Temperature-  Pulse oximetry-     Constitutional: [x] Appears well-developed and well-nourished [x] No apparent distress      [] Abnormal-   Mental status  [x] Alert and awake  [x] Oriented to person/place/time [x]Able to follow commands      Eyes:  EOM    []  Normal  [] Abnormal-  Sclera  []  Normal  [] Abnormal -         Discharge []  None visible  [] Abnormal -    HENT:   [x] Normocephalic, atraumatic. [] Abnormal MAXILLARY SINUS TENDERNESS REPORTED  [x] Mouth/Throat: Mucous membranes are moist.     External Ears [x] Normal  [] Abnormal-     Neck: [x] No visualized mass     Pulmonary/Chest: [x] Respiratory effort normal.  [x] No visualized signs of difficulty breathing or respiratory distress        [] Abnormal-      Musculoskeletal:   [] Normal gait with no signs of ataxia         [x] Normal range of motion of neck        [] Abnormal-       Neurological:        [x] No Facial Asymmetry (Cranial nerve 7 motor function) (limited exam to video visit)          [x] No gaze palsy        [] Abnormal-         Skin:        [x] No significant exanthematous lesions or discoloration noted on facial skin         [] Abnormal-            Psychiatric:       [x] Normal Affect [x] No Hallucinations        [] Abnormal-     Other pertinent observable physical exam findings-     ASSESSMENT/PLAN:  1. Acute non-recurrent sinusitis, unspecified location  Start on anbx  Add mucinex and flonase to regimen  Continue on claritin. Increase fluids.    Will call if no improvement  - fluticasone (FLONASE) 50 MCG/ACT nasal spray; 2 sprays by Nasal route daily  Dispense: 16 g; Refill: 1  - azithromycin (ZITHROMAX) 250 MG tablet; Take 2 tabs PO (500mg) on day 1, then 1 tab daily days 2-5  Dispense: 1 packet; Refill: 0  - guaiFENesin (MUCINEX) 600 MG extended release tablet; Take 1 tablet by mouth 2 times daily for 15 days  Dispense: 30 tablet; Refill: 0    2. Mild persistent asthma without complication  Stable at this time  Patient will start on asthma regimen usually in November. Continue per pulm. Return if symptoms worsen or fail to improve. Ct Vera, was evaluated through a synchronous (real-time) audio-video encounter. The patient (or guardian if applicable) is aware that this is a billable service, which includes applicable co-pays. This Virtual Visit was conducted with patient's (and/or legal guardian's) consent. The visit was conducted pursuant to the emergency declaration under the [address] [place] [address], [address] [place] [address] authority and the [employer] and [employer] General Act. Patient identification was verified, and a caregiver was present when appropriate. The patient was located at Home: [address] [id number]. Provider was located at [facility] (Appt Dept): [address] [address]  [address] [facility]. Total time spent on this encounter: Not billed by time    --JEANNE Romero CNP on 10/4/2022 at 12:44 PM    An electronic signature was used to authenticate this note.

## 2022-10-04 NOTE — TELEPHONE ENCOUNTER
the following: Your Provider(s) may not able to provide medical treatment for your particular condition and you may be required to seek alternative healthcare or emergency care services. The electronic systems or other security protocols or safeguards used in the Service could fail, causing a breach of privacy of your medical or other information. Given regulatory requirements in certain jurisdictions, your Provider(s) diagnosis and/or treatment options, especially pertaining to certain prescriptions, may be limited. Acceptance   You understand that Services will be provided via Telehealth. This process involves the use of HIPAA compliant and secure, real-time audio-visual interfacing with a qualified and appropriately trained provider located at [facility]. You understand that, under no circumstances, will this session be recorded. You understand that the Provider(s) at [facility] and other clinical participants will be party to the information obtained during the Telehealth session in accordance with best medical practices. You understand that the information obtained during the Telehealth session will be used to help determine the most appropriate treatment options. You understand that You have the right to revoke this consent at any point in time. You understand that Telehealth is voluntary, and that continued treatment is not dependent upon consent. You understand that, in the event of non-consent to Telehealth services and/or technical difficulties, you will obtain services as typically provided in the absence of Telehealth technology. You understand that this consent will be kept in Your medical record. No potential benefits from the use of Telehealth or specific results can be guaranteed. Your condition may not be cured or improved and, in some cases, may get worse.    There are limitations in the provision of medical care and treatment via The patient was read the following statement and has consented to the visit as of 10/4/22. The patient has been scheduled for their first telehealth visit on 10/4/22 with Vitor.

## 2022-10-10 ENCOUNTER — HOSPITAL ENCOUNTER (EMERGENCY)
Age: 47
Discharge: HOME | End: 2022-10-10
Payer: COMMERCIAL

## 2022-10-10 ENCOUNTER — APPOINTMENT (OUTPATIENT)
Dept: GENERAL RADIOLOGY | Age: 47
End: 2022-10-10
Payer: COMMERCIAL

## 2022-10-10 VITALS
HEART RATE: 66 BPM | TEMPERATURE: 97.8 F | DIASTOLIC BLOOD PRESSURE: 60 MMHG | SYSTOLIC BLOOD PRESSURE: 151 MMHG | OXYGEN SATURATION: 99 % | RESPIRATION RATE: 16 BRPM

## 2022-10-10 DIAGNOSIS — S39.012A STRAIN OF LUMBAR REGION, INITIAL ENCOUNTER: ICD-10-CM

## 2022-10-10 DIAGNOSIS — S16.1XXA STRAIN OF NECK MUSCLE, INITIAL ENCOUNTER: ICD-10-CM

## 2022-10-10 DIAGNOSIS — Z04.1 ENCOUNTER FOR EXAMINATION FOLLOWING MOTOR VEHICLE ACCIDENT (MVA): Primary | ICD-10-CM

## 2022-10-10 PROCEDURE — 72100 X-RAY EXAM L-S SPINE 2/3 VWS: CPT

## 2022-10-10 PROCEDURE — 72040 X-RAY EXAM NECK SPINE 2-3 VW: CPT

## 2022-10-10 PROCEDURE — 6370000000 HC RX 637 (ALT 250 FOR IP): Performed by: PHYSICIAN ASSISTANT

## 2022-10-10 PROCEDURE — 99283 EMERGENCY DEPT VISIT LOW MDM: CPT

## 2022-10-10 RX ORDER — CYCLOBENZAPRINE HCL 10 MG
10 TABLET ORAL 2 TIMES DAILY PRN
Qty: 20 TABLET | Refills: 0 | Status: SHIPPED | OUTPATIENT
Start: 2022-10-10 | End: 2022-10-20

## 2022-10-10 RX ORDER — IBUPROFEN 800 MG/1
800 TABLET ORAL ONCE
Status: COMPLETED | OUTPATIENT
Start: 2022-10-10 | End: 2022-10-10

## 2022-10-10 RX ORDER — METHYLPREDNISOLONE 4 MG/1
TABLET ORAL
Qty: 1 KIT | Refills: 0 | Status: SHIPPED | OUTPATIENT
Start: 2022-10-10 | End: 2022-10-31

## 2022-10-10 RX ADMIN — IBUPROFEN 800 MG: 800 TABLET, FILM COATED ORAL at 08:11

## 2022-10-10 ASSESSMENT — PAIN SCALES - GENERAL
PAINLEVEL_OUTOF10: 5
PAINLEVEL_OUTOF10: 5

## 2022-10-10 ASSESSMENT — ENCOUNTER SYMPTOMS
SHORTNESS OF BREATH: 0
ABDOMINAL PAIN: 0
DIARRHEA: 0
NAUSEA: 0
COUGH: 0
BACK PAIN: 1
VOMITING: 0
CHEST TIGHTNESS: 0

## 2022-10-10 ASSESSMENT — LIFESTYLE VARIABLES
HOW MANY STANDARD DRINKS CONTAINING ALCOHOL DO YOU HAVE ON A TYPICAL DAY: PATIENT DOES NOT DRINK
HOW OFTEN DO YOU HAVE A DRINK CONTAINING ALCOHOL: NEVER

## 2022-10-10 ASSESSMENT — PAIN - FUNCTIONAL ASSESSMENT: PAIN_FUNCTIONAL_ASSESSMENT: 0-10

## 2022-10-10 NOTE — ED NOTES
Patient discharged with discharge instructions and medications reviewed. Patient verbalized understanding of instructions and follow up.       Licha Shrestha RN  10/10/22 [id number]
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
## 2022-10-11 ENCOUNTER — TELEPHONE (OUTPATIENT)
Dept: PRIMARY CARE CLINIC | Age: 47
End: 2022-10-11

## 2022-10-11 DIAGNOSIS — K63.5 POLYP OF COLON, UNSPECIFIED PART OF COLON, UNSPECIFIED TYPE: Primary | ICD-10-CM

## 2022-10-11 NOTE — TELEPHONE ENCOUNTER
[facility] Initial Follow Up Call    Outreach made within 2 business days of discharge: Yes    Patient: Kayli Harris Patient : 1975   MRN: [id number]  Reason for Admission: There are no discharge diagnoses documented for the most recent discharge. Discharge Date: 10/10/22       Spoke with:  [facility]    Discharge department/facility:  [facility] Interactive Patient Contact:  Was patient able to fill all prescriptions: Yes  Was patient instructed to bring all medications to the follow-up visit: Yes  Is patient taking all medications as directed in the discharge summary?  Yes  Does patient understand their discharge instructions: Yes  Does patient have questions or concerns that need addressed prior to 7-14 day follow up office visit: yes -  has  to go  to [employer]  comp  dr. Parveen Neville appointment with PCP within 7-14 days    Follow Up  Future Appointments   Date Time Provider Daisy Swann   3/9/2023  9:00 AM Terry Machado MD [place] [facility] [address]

## 2022-10-12 NOTE — TELEPHONE ENCOUNTER
Referral placed    MD Courtney  [address] [place] [address] [address], [address]  [place] [address]  Phone: [phone]  Fax: [phone]

## 2022-10-31 NOTE — PROGRESS NOTES

## 2022-11-01 ENCOUNTER — ANESTHESIA EVENT (OUTPATIENT)
Dept: ENDOSCOPY | Age: 47
End: 2022-11-01
Payer: COMMERCIAL

## 2022-11-01 ENCOUNTER — HOSPITAL ENCOUNTER (OUTPATIENT)
Age: 47
Setting detail: OUTPATIENT SURGERY
Discharge: HOME | End: 2022-11-01
Attending: INTERNAL MEDICINE | Admitting: INTERNAL MEDICINE
Payer: COMMERCIAL

## 2022-11-01 ENCOUNTER — ANESTHESIA (OUTPATIENT)
Dept: ENDOSCOPY | Age: 47
End: 2022-11-01
Payer: COMMERCIAL

## 2022-11-01 VITALS
WEIGHT: 230 LBS | TEMPERATURE: 96.9 F | RESPIRATION RATE: 15 BRPM | OXYGEN SATURATION: 98 % | SYSTOLIC BLOOD PRESSURE: 101 MMHG | HEIGHT: 63 IN | DIASTOLIC BLOOD PRESSURE: 74 MMHG | BODY MASS INDEX: 40.75 KG/M2 | HEART RATE: 56 BPM

## 2022-11-01 DIAGNOSIS — R13.19 ESOPHAGEAL DYSPHAGIA: ICD-10-CM

## 2022-11-01 DIAGNOSIS — Z86.010 PERSONAL HISTORY OF COLONIC POLYPS: ICD-10-CM

## 2022-11-01 PROCEDURE — 3609010600 HC COLONOSCOPY POLYPECTOMY SNARE/COLD BIOPSY: Performed by: INTERNAL MEDICINE

## 2022-11-01 PROCEDURE — 2709999900 HC NON-CHARGEABLE SUPPLY: Performed by: INTERNAL MEDICINE

## 2022-11-01 PROCEDURE — 3609017700 HC EGD DILATION GASTRIC/DUODENAL STRICTURE: Performed by: INTERNAL MEDICINE

## 2022-11-01 PROCEDURE — 2580000003 HC RX 258

## 2022-11-01 PROCEDURE — 6360000002 HC RX W HCPCS

## 2022-11-01 PROCEDURE — 3609012400 HC EGD TRANSORAL BIOPSY SINGLE/MULTIPLE: Performed by: INTERNAL MEDICINE

## 2022-11-01 PROCEDURE — 2580000003 HC RX 258: Performed by: INTERNAL MEDICINE

## 2022-11-01 PROCEDURE — 6370000000 HC RX 637 (ALT 250 FOR IP): Performed by: INTERNAL MEDICINE

## 2022-11-01 PROCEDURE — 88305 TISSUE EXAM BY PATHOLOGIST: CPT

## 2022-11-01 PROCEDURE — 2500000003 HC RX 250 WO HCPCS

## 2022-11-01 PROCEDURE — 3700000000 HC ANESTHESIA ATTENDED CARE: Performed by: INTERNAL MEDICINE

## 2022-11-01 PROCEDURE — 7100000010 HC PHASE II RECOVERY - FIRST 15 MIN: Performed by: INTERNAL MEDICINE

## 2022-11-01 PROCEDURE — 3700000001 HC ADD 15 MINUTES (ANESTHESIA): Performed by: INTERNAL MEDICINE

## 2022-11-01 PROCEDURE — 7100000011 HC PHASE II RECOVERY - ADDTL 15 MIN: Performed by: INTERNAL MEDICINE

## 2022-11-01 RX ORDER — LIDOCAINE HYDROCHLORIDE 20 MG/ML
INJECTION, SOLUTION EPIDURAL; INFILTRATION; INTRACAUDAL; PERINEURAL PRN
Status: DISCONTINUED | OUTPATIENT
Start: 2022-11-01 | End: 2022-11-01 | Stop reason: SDUPTHER

## 2022-11-01 RX ORDER — SODIUM CHLORIDE, SODIUM LACTATE, POTASSIUM CHLORIDE, CALCIUM CHLORIDE 600; 310; 30; 20 MG/100ML; MG/100ML; MG/100ML; MG/100ML
INJECTION, SOLUTION INTRAVENOUS ONCE
Status: COMPLETED | OUTPATIENT
Start: 2022-11-01 | End: 2022-11-01

## 2022-11-01 RX ORDER — SODIUM CHLORIDE, SODIUM LACTATE, POTASSIUM CHLORIDE, CALCIUM CHLORIDE 600; 310; 30; 20 MG/100ML; MG/100ML; MG/100ML; MG/100ML
INJECTION, SOLUTION INTRAVENOUS CONTINUOUS PRN
Status: DISCONTINUED | OUTPATIENT
Start: 2022-11-01 | End: 2022-11-01 | Stop reason: SDUPTHER

## 2022-11-01 RX ORDER — PROPOFOL 10 MG/ML
INJECTION, EMULSION INTRAVENOUS PRN
Status: DISCONTINUED | OUTPATIENT
Start: 2022-11-01 | End: 2022-11-01 | Stop reason: SDUPTHER

## 2022-11-01 RX ORDER — LIDOCAINE HYDROCHLORIDE 10 MG/ML
0.1 INJECTION, SOLUTION EPIDURAL; INFILTRATION; INTRACAUDAL; PERINEURAL
Status: DISCONTINUED | OUTPATIENT
Start: 2022-11-01 | End: 2022-11-01 | Stop reason: HOSPADM

## 2022-11-01 RX ORDER — SIMETHICONE 20 MG/.3ML
EMULSION ORAL PRN
Status: DISCONTINUED | OUTPATIENT
Start: 2022-11-01 | End: 2022-11-01 | Stop reason: ALTCHOICE

## 2022-11-01 RX ADMIN — PROPOFOL 50 MG: 10 INJECTION, EMULSION INTRAVENOUS at 09:47

## 2022-11-01 RX ADMIN — LIDOCAINE HYDROCHLORIDE 60 MG: 20 INJECTION, SOLUTION EPIDURAL; INFILTRATION; INTRACAUDAL; PERINEURAL at 09:41

## 2022-11-01 RX ADMIN — SODIUM CHLORIDE, SODIUM LACTATE, POTASSIUM CHLORIDE, AND CALCIUM CHLORIDE: .6; .31; .03; .02 INJECTION, SOLUTION INTRAVENOUS at 09:30

## 2022-11-01 RX ADMIN — PROPOFOL 50 MG: 10 INJECTION, EMULSION INTRAVENOUS at 09:51

## 2022-11-01 RX ADMIN — PROPOFOL 50 MG: 10 INJECTION, EMULSION INTRAVENOUS at 09:49

## 2022-11-01 RX ADMIN — SODIUM CHLORIDE, POTASSIUM CHLORIDE, SODIUM LACTATE AND CALCIUM CHLORIDE: 600; 310; 30; 20 INJECTION, SOLUTION INTRAVENOUS at 08:32

## 2022-11-01 RX ADMIN — PROPOFOL 50 MG: 10 INJECTION, EMULSION INTRAVENOUS at 09:44

## 2022-11-01 RX ADMIN — PROPOFOL 100 MG: 10 INJECTION, EMULSION INTRAVENOUS at 09:41

## 2022-11-01 ASSESSMENT — PAIN - FUNCTIONAL ASSESSMENT: PAIN_FUNCTIONAL_ASSESSMENT: 0-10

## 2022-11-01 ASSESSMENT — PAIN SCALES - GENERAL: PAINLEVEL_OUTOF10: 0

## 2022-11-01 NOTE — ANESTHESIA PRE PROCEDURE
Department of Anesthesiology  Preprocedure Note       Name:  Charisse Blake   Age:  50 y.o.  :  1975                                          MRN:  [id number]         Date:  2022      Surgeon: Isabella Sepulveda):  Tyron Dahl MD    Procedure: Procedure(s):  COLONOSCOPY  EGD    Medications prior to admission:   Prior to Admission medications    Medication Sig Start Date End Date Taking? Authorizing Provider   MAGNESIUM PO Take by mouth daily   Yes Historical Provider, MD   fluticasone (FLONASE) 50 MCG/ACT nasal spray 2 sprays by Nasal route daily 10/4/22   JEANNE Kam CNP   esomeprazole Magnesium (NEXIUM) 40 MG PACK Take 40 mg by mouth daily    Historical Provider, MD   valACYclovir (VALTREX) 500 MG tablet Take 1,000mg on day 1, followed by 500mg daily x 5 days prn for outbreak 3/7/22   JEANNE Kam CNP   fluticasone-salmeterol (ADVAIR HFA) 115-21 MCG/ACT inhaler Inhale 2 puffs into the lungs 2 times daily  Patient taking differently: Inhale 2 puffs into the lungs 2 times daily as needed 22   JEANNE Kam CNP   albuterol sulfate HFA (PROAIR HFA) 108 (90 Base) MCG/ACT inhaler Inhale 2 puffs into the lungs every 6 hours as needed for Wheezing 22   JEANNE Everett CNP   loratadine (CLARITIN) 10 MG tablet Take 10 mg by mouth as needed    Historical Provider, MD   Ascorbic Acid (VITAMIN C PO) Take by mouth daily    Historical Provider, MD   Cholecalciferol (VITAMIN D3 PO) Take by mouth    Historical Provider, MD   Montelukast Sodium (SINGULAIR PO) Take by mouth as needed    Historical Provider, MD       Current medications:    Current Facility-Administered Medications   Medication Dose Route Frequency Provider Last Rate Last Admin    lidocaine PF 1 % injection 0.1 mL  0.1 mL IntraDERmal Once PRN Tyron Dahl MD           Allergies:     Allergies   Allergen Reactions    Coconut Fatty Acids     Proair Respiclick [Albuterol Sulfate]        Problem List: Patient Active Problem List   Diagnosis Code    Asthma J45.909    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) E66.01, Z68.41    Gastroesophageal reflux disease without esophagitis K21.9       Past Medical History:        Diagnosis Date    Acid reflux     Asthma     Chronic back pain     Colon polyp     tubular adenoma    Implantable loop recorder present     Kidney stones     Osteoarthritis     Palpitations     Sleep apnea     not currently using CPAP       Past Surgical History:        Procedure Laterality Date     SECTION      CHOLECYSTECTOMY      HYSTERECTOMY, VAGINAL      KIDNEY STONE SURGERY         Social History:    Social History     Tobacco Use    Smoking status: Never    Smokeless tobacco: Never   Substance Use Topics    Alcohol use: Yes     Comment: 2 drinks per year                                Counseling given: Not Answered      Vital Signs (Current):   Vitals:    10/31/22 0815 22 0833   BP:  125/77   Pulse:  68   Resp:  18   Temp:  96.9 °F (36.1 °C)   TempSrc:  Temporal   SpO2:  97%   Weight: 230 lb (104.3 kg) 230 lb (104.3 kg)   Height: 5' 3\" (1.6 m) 5' 3\" (1.6 m)                                              BP Readings from Last 3 Encounters:   22 125/77   10/10/22 (!) 151/60   22 114/70       NPO Status: Time of last liquid consumption: 0200                        Time of last solid consumption: 2200                        Date of last liquid consumption: 22                        Date of last solid food consumption: 10/30/22    BMI:   Wt Readings from Last 3 Encounters:   22 230 lb (104.3 kg)   03/09/22 231 lb (104.8 kg)   03/09/22 230 lb (104.3 kg)     Body mass index is 40.74 kg/m². CBC:   Lab Results   Component Value Date/Time    WBC 5.6 03/09/2022 12:34 PM    RBC 4.76 03/09/2022 12:34 PM    HGB 14.3 03/09/2022 12:34 PM    HCT 43.2 03/09/2022 12:34 PM    MCV 90.7 03/09/2022 12:34 PM    RDW 13.0 03/09/2022 12:34 PM     03/09/2022 12:34 PM       CMP:   Lab Results   Component Value Date/Time     03/09/2022 12:34 PM    K 4.3 03/09/2022 12:34 PM     03/09/2022 12:34 PM    CO2 24 03/09/2022 12:34 PM    BUN 12 03/09/2022 12:34 PM    CREATININE 0.7 03/09/2022 12:34 PM    GFRAA >60 03/09/2022 12:34 PM    AGRATIO 1.9 03/09/2022 12:34 PM    LABGLOM >60 03/09/2022 12:34 PM    GLUCOSE 83 03/09/2022 12:34 PM    PROT 6.7 03/09/2022 12:34 PM    CALCIUM 9.5 03/09/2022 12:34 PM    BILITOT 0.4 03/09/2022 12:34 PM    ALKPHOS 52 03/09/2022 12:34 PM    AST 15 03/09/2022 12:34 PM    ALT 16 03/09/2022 12:34 PM       POC Tests: No results for input(s): POCGLU, POCNA, POCK, POCCL, POCBUN, POCHEMO, POCHCT in the last 72 hours.     Coags: No results found for: PROTIME, INR, APTT    HCG (If Applicable): No results found for: PREGTESTUR, PREGSERUM, HCG, HCGQUANT     ABGs: No results found for: PHART, PO2ART, EIY1ZUE, ISN8ODJ, BEART, D2DRJTXH     Type & Screen (If Applicable):  No results found for: LABABO, LABRH    Drug/Infectious Status (If Applicable):  No results found for: HIV, HEPCAB    COVID-19 Screening (If Applicable): No results found for: COVID19        Anesthesia Evaluation  Patient summary reviewed and Nursing notes reviewed  Airway: Mallampati: II  TM distance: >3 FB   Neck ROM: full  Mouth opening: > = 3 FB   Dental: normal exam         Pulmonary:normal exam    (+) sleep apnea:  asthma:                            Cardiovascular:Negative CV ROS                      Neuro/Psych:   Negative Neuro/Psych ROS              GI/Hepatic/Renal:   (+) GERD:, morbid obesity          Endo/Other: Negative Endo/Other ROS                    Abdominal:             Vascular: negative vascular ROS. Other Findings:           Anesthesia Plan      MAC     ASA 3       Induction: intravenous. Anesthetic plan and risks discussed with patient. Plan discussed with CRNA.     Attending anesthesiologist reviewed and agrees with Preprocedure content                EDWIN Claudene Park, MD   11/1/2022

## 2022-11-01 NOTE — H&P
Gastroenterology Note             Pre-operative History and Physical    Patient: Adama Evans  : 1975  CSN:     History Obtained From:  patient and/or guardian. HISTORY OF PRESENT ILLNESS:    The patient is a 50 y.o. female  here for colonoscopy. Past Medical History:    Past Medical History:   Diagnosis Date    Acid reflux     Asthma     Chronic back pain     Colon polyp     tubular adenoma    Implantable loop recorder present     Kidney stones     Osteoarthritis     Palpitations     Sleep apnea     not currently using CPAP     Past Surgical History:    Past Surgical History:   Procedure Laterality Date     SECTION      CHOLECYSTECTOMY      HYSTERECTOMY, VAGINAL      KIDNEY STONE SURGERY       Medications Prior to Admission:   No current facility-administered medications on file prior to encounter.      Current Outpatient Medications on File Prior to Encounter   Medication Sig Dispense Refill    MAGNESIUM PO Take by mouth daily      fluticasone (FLONASE) 50 MCG/ACT nasal spray 2 sprays by Nasal route daily 16 g 1    esomeprazole Magnesium (NEXIUM) 40 MG PACK Take 40 mg by mouth daily      valACYclovir (VALTREX) 500 MG tablet Take 1,000mg on day 1, followed by 500mg daily x 5 days prn for outbreak 30 tablet 2    fluticasone-salmeterol (ADVAIR HFA) 115-21 MCG/ACT inhaler Inhale 2 puffs into the lungs 2 times daily (Patient taking differently: Inhale 2 puffs into the lungs 2 times daily as needed) 12 g 1    albuterol sulfate HFA (PROAIR HFA) 108 (90 Base) MCG/ACT inhaler Inhale 2 puffs into the lungs every 6 hours as needed for Wheezing 18 g 0    loratadine (CLARITIN) 10 MG tablet Take 10 mg by mouth as needed      Ascorbic Acid (VITAMIN C PO) Take by mouth daily      Cholecalciferol (VITAMIN D3 PO) Take by mouth      Montelukast Sodium (SINGULAIR PO) Take by mouth as needed          Allergies:  Coconut fatty acids and Proair respiclick [albuterol sulfate]      Social History: Social History     Tobacco Use    Smoking status: Never    Smokeless tobacco: Never   Substance Use Topics    Alcohol use: Yes     Comment: 2 drinks per year     Family History:   Family History   Problem Relation Age of Onset    High Blood Pressure Mother     Other Mother         demen    Diabetes Father     High Cholesterol Father     High Blood Pressure Father     Atrial Fibrillation Father     Other Father         copd    Heart Attack Father     Cancer Brother        PHYSICAL EXAM:      /77   Pulse 68   Temp 96.9 °F (36.1 °C) (Temporal)   Resp 18   Ht 5' 3\" (1.6 m)   Wt 230 lb (104.3 kg)   SpO2 97%   BMI 40.74 kg/m²  I        Heart:   RRR, normal s1s2    Lungs:  CTA bilat,  Normal effort    Abdomen:   NT, ND      ASA Grade:  ASA 3 - Patient with moderate systemic disease with functional limitations    Mallampati Class: 2          ASSESSMENT AND PLAN:    1. Patient is a 50 y.o. female here for Colonoscopy with MAC.   2.  Procedure options, risks and benefits reviewed with patient. Patient expresses understanding.     Sterling Vaz MD,   Emi Dawson  11/1/2022

## 2022-11-01 NOTE — ANESTHESIA POSTPROCEDURE EVALUATION
Department of Anesthesiology  Postprocedure Note    Patient: Stephaen Coppola  MRN: [id number]  YOB: 1975  Date of evaluation: 11/1/2022      Procedure Summary     Date: 11/01/22 Room / Location: [address] / [facility]    Anesthesia Start: 9551 Anesthesia Stop: 1020    Procedures:       COLONOSCOPY POLYPECTOMY SNARE/COLD BIOPSY      EGD BIOPSY      EGD ESOPHAGOGASTRODUODENOSCOPY DILATATION Diagnosis:       Personal history of colonic polyps      Esophageal dysphagia      (Personal history of colonic polyps [Z86.010] Esophageal dysphagia [R13.19])    Surgeons: Zayda Durand MD Responsible Provider: Edmundo Harvey MD    Anesthesia Type: MAC ASA Status: 3          Anesthesia Type: No value filed.     Haven Phase I: Haven Score: 10    Haven Phase II: Haven Score: 9      Anesthesia Post Evaluation    Patient location during evaluation: PACU  Patient participation: complete - patient participated  Level of consciousness: awake  Pain score: 0  Airway patency: patent  Nausea & Vomiting: no nausea  Complications: no  Cardiovascular status: blood pressure returned to baseline  Respiratory status: acceptable  Hydration status: euvolemic

## 2022-11-01 NOTE — PROCEDURES
EGD and Colonoscopy Procedure  Note            Patient: Tawanna Daly  : 1975  CSN:     Procedure: Esophagogastroduodenoscopy with Colonoscopy    Date:  2022     Surgeon:  Efrain Kong MD     Referring Provider:  Carmen Canchola    Preoperative Diagnosis:  Dysphagia, screening for colon cancer    Postoperative Diagnosis:  Mild esophagitis, screening for colon cancer    Anesthesia:  Propofol    EBL: <50 mL    Indications: This is a 50y.o. year old female who presents today with New-onset odynophagia. Screening for colon cancer      Procedure Details:    With the patient in left lateral position the endoscope was passed through the hypopharynx into the esophagus. The scope was advanced all the way to the duodenum. The mucosa in the duodenal bulb, post bulbar region in the descending duodenum appeared normal.  The mucosa in the antrum appeared erythematous, biopsies were taken to rule out Hpylori bacteria. The mucosa in the remaining part of the stomach and retroflexion appeared normal.  The GE junction was at around 35cms. The GE Junction appeared slightly irregular, distal esophageal biopsies were taken to rule out de la garza's esophagitis. Proximal esophageal biopsies were taken to rule out eosinophilic esophagitis. The mucosa in the remainder of the esophagus appeared normal.  The scope was withdrawn and the procedure was terminated. Gastric or Duodenal ulcer present: No    Procedure Details:    With the patient in left lateral decubitus position and a digital rectal examination was performed, no abnormalities noted. The scope was advanced all the way to the cecum, the mucosa appeared normal.  Appendix was identified. The terminal ileum was briefly intubated, the mucosa appeared normal.  The mucosa in the ascending, transverse, descending, sigmoid and rectum appeared normal.  In the sigmoid colon a 4mm and 2mm polyp were cold snared.   On retroflexion internal hemorrhoids were noted.  Scope was withdrawn and the procedure was terminated. Impression:  Mild gastritis, mild esophagitis, colon polyps removed    Recommendations:  Await pathology.     Benita Mcgowan MD, MD   [address]  11/1/2022

## 2022-11-01 NOTE — DISCHARGE INSTRUCTIONS
PATIENT INSTRUCTIONS  POST-SEDATION    Rishi Elizabeth          IMMEDIATELY FOLLOWING PROCEDURE:    Do not drive or operate machinery for the first twenty four hours after surgery. Do not make any important decisions for twenty four hours after surgery or while taking narcotic pain medications or sedatives. You should NOT BE LEFT UNATTENDED OR ALONE. A responsible adult should be with you for the rest of the day of your procedure and also during the night for your protection and safety. You may experience some light headedness. Rest at home with activity as tolerated. You may not need to go to bed, but it is important to rest for the next 24 hours. You should not engage in athletic sports such as basketball, volleyball, jogging, skating, or activities requiring refined motor skills for 24 hours. If you develop intractable nausea and vomiting or a severe headache please notify your doctor immediately. You are not expected to have any fever, but if you feel warm, take your temperature. If you have a fever 101 degrees or higher, call your doctor. If you have had an Endoscopy:   *Eat lightly for your first meal and gradually resume your normal / prescribed diet. *If you have had a colonoscopy, do not expect a normal bowel movement for approximately three days due to the cleansing of the large intestine prior to colonoscopy. ONCE YOU ARE HOME, IF YOU SHOULD HAVE:  Difficulty in breathing, persistent nausea or vomiting, bleeding you feel is excessive, or pain that is unusual, increased abdominal bloating, or any swelling, fever / chills, call your physician. If you cannot contact your physician, but feel that your signs and symptoms need a physician's attention, go to the Emergency Department. FOLLOW-UP:    Please follow up with Kayli Gaines CNP as scheduled or needed. Dr. Torsten Hernandez MD will call you with the biopsy findings.   Call Dr. Torsten Hernandez MD if there are any GI concerns. [phone]    Repeat Colonoscopy in 5 years or based on pathology. You may be receiving a follow up phone call to ask about your care. Colonoscopy: What to Expect at [facility]  After you have a colonoscopy, you will stay at the clinic for 1 to 2 hours until the medicines wear off. Then you can go home. But you will need to arrange for a ride. Your doctor will tell you when you can eat and do your other usual activities. Your doctor will talk to you about when you will need your next colonoscopy. Your doctor can help you decide how often you need to be checked. This will depend on the results of your test and your risk for colorectal cancer. After the test, you may be bloated or have gas pains. You may need to pass gas. If a biopsy was done or a polyp was removed, you may have streaks of blood in your stool (feces) for a few days. Problems such as heavy rectal bleeding may not occur until several weeks after the test. This isn't common. But it can happen after polyps are removed. This care sheet gives you a general idea about how long it will take for you to recover. But each person recovers at a different pace. Follow the steps below to get better as quickly as possible. How can you care for yourself at home? Activity    Rest when you feel tired. You can do your normal activities when it feels okay to do so. Diet    Follow your doctor's directions for eating. Unless your doctor has told you not to, drink plenty of fluids. This helps to replace the fluids that were lost during the colon prep. Do not drink alcohol. Medicines    Your doctor will tell you if and when you can restart your medicines. He or she will also give you instructions about taking any new medicines. If you take blood thinners, such as warfarin (Coumadin), clopidogrel (Plavix), or aspirin, be sure to talk to your doctor.  He or she will tell you if and when to start taking those medicines hospital or clinic for 1 to 2 hours. This will allow the medicine to wear off. You will be able to go home after your doctor or nurse checks to make sure you are not having any problems. You may have to stay overnight if you had treatment during the test. You may have a sore throat for a day or two after the test.  This care sheet gives you a general idea about what to expect after the test.  How can you care for yourself at home? Activity  Rest as much as you need to after you go home. You should be able to go back to your usual activities the day after the test.  Diet  Follow your doctor's directions for eating after the test.  Drink plenty of fluids (unless your doctor has told you not to). Medications  If you have a sore throat the day after the test, use an over-the-counter spray to numb your throat. Follow-up care is a key part of your treatment and safety. Be sure to make and go to all appointments, and call your doctor if you are having problems. It's also a good idea to know your test results and keep a list of the medicines you take. When should you call for help? Call 911 anytime you think you may need emergency care. For example, call if:    You passed out (loses consciousness). You have trouble breathing. You pass maroon or bloody stools. Call your doctor now or seek immediate medical care if:    You have pain that does not get better after your take pain medicine. You have new or worse belly pain. You have blood in your stools. You are sick to your stomach and cannot keep fluids down. You have a fever. You cannot pass stools or gas. Watch closely for changes in your health, and be sure to contact your doctor if:    Your throat still hurts after a day or two. You do not get better as expected. Where can you learn more? Go to https://chcarloseb.Lifetable. org and sign in to your [employer] account.  Enter S559 in the [employer] box to learn more about \"Upper GI Endoscopy: What to Expect at Home. \"     If you do not have an account, please click on the \"Sign Up Now\" link. Current as of: May 12, 2017  Content Version: 11.6  © 7648-4760 [employer]. Care instructions adapted under license by [facility] [place] ([facility]). If you have questions about a medical condition or this instruction, always ask your healthcare professional. [address] any warranty or liability for your use of this information.

## 2022-11-06 PROBLEM — K22.70 BARRETT'S ESOPHAGUS WITHOUT DYSPLASIA: Status: ACTIVE | Noted: 2022-11-06

## 2022-11-06 PROBLEM — K22.719 BARRETT'S ESOPHAGUS WITH DYSPLASIA: Status: ACTIVE | Noted: 2022-11-06

## 2023-02-10 NOTE — PROGRESS NOTES
ENCOUNTER DATE: 2/13/2023     NAME: Malachi Martinez   AGE: 50 y.o. GENDER: female   YOB: 1975    Patient Active Problem List   Diagnosis    Flank lipoma    History of Chiari malformation    Polyp of colon    Lung nodules    Abnormal CT scan of lung    History of nephrolithiasis    Recurrent cold sores    Intermittent palpitations    Chronic neck pain    Class 3 severe obesity without serious comorbidity with body mass index (BMI) of 40.0 to 44.9 in adult (HCC)    Gastroesophageal reflux disease without esophagitis    Odonnell's esophagus without dysplasia    Mixed hyperlipidemia    Mild persistent asthma without complication    Family history of renal cell carcinoma    Chronic pain of left knee    Vitamin D deficiency    Elevated parathyroid hormone      Allergies   Allergen Reactions    Coconut Fatty Acids     Proair Respiclick [Albuterol Sulfate]      Current Outpatient Medications on File Prior to Visit   Medication Sig Dispense Refill    MAGNESIUM PO Take by mouth daily      fluticasone (FLONASE) 50 MCG/ACT nasal spray 2 sprays by Nasal route daily 16 g 1    esomeprazole Magnesium (NEXIUM) 40 MG PACK Take 40 mg by mouth daily      valACYclovir (VALTREX) 500 MG tablet Take 1,000mg on day 1, followed by 500mg daily x 5 days prn for outbreak 30 tablet 2    fluticasone-salmeterol (ADVAIR HFA) 115-21 MCG/ACT inhaler Inhale 2 puffs into the lungs 2 times daily (Patient taking differently: Inhale 2 puffs into the lungs 2 times daily as needed) 12 g 1    albuterol sulfate HFA (PROAIR HFA) 108 (90 Base) MCG/ACT inhaler Inhale 2 puffs into the lungs every 6 hours as needed for Wheezing 18 g 0    loratadine (CLARITIN) 10 MG tablet Take 10 mg by mouth as needed      Ascorbic Acid (VITAMIN C PO) Take by mouth daily      Cholecalciferol (VITAMIN D3 PO) Take by mouth      Montelukast Sodium (SINGULAIR PO) Take by mouth as needed       No current facility-administered medications on file prior to visit. Past Medical History:   Diagnosis Date    Acid reflux     Asthma     Chronic back pain     Colon polyp     tubular adenoma    Implantable loop recorder present     Kidney stones     Osteoarthritis     Palpitations     Sleep apnea     not currently using CPAP     Past Surgical History:   Procedure Laterality Date     SECTION      CHOLECYSTECTOMY      COLONOSCOPY N/A 2022    COLONOSCOPY POLYPECTOMY SNARE/COLD BIOPSY performed by Olivia Zacarias MD at [facility]      UPPER GASTROINTESTINAL ENDOSCOPY N/A 2022    EGD BIOPSY performed by [facility] at [facility]  2022    EGD ESOPHAGOGASTRODUODENOSCOPY DILATATION performed by Olivia Zacarias MD at [facility]      Family History   Problem Relation Age of Onset    High Blood Pressure Mother     Other Mother         demen    Diabetes Father     High Cholesterol Father     High Blood Pressure Father     Atrial Fibrillation Father     Other Father         copd    Heart Attack Father     Cancer Brother      Social History     Tobacco Use    Smoking status: Never    Smokeless tobacco: Never   Substance Use Topics    Alcohol use: Yes     Comment: 2 drinks per year      Patient Care Team:  JEANNE Burns CNP as PCP - General (Family Nurse Practitioner)  JEANNE Burns CNP as PCP - Empaneled Provider  Randy Palmer MD as Surgeon (General Surgery)  Lauryn Dailey MD as Consulting Physician (Pulmonology)    Chief Complaint   Patient presents with    Annual Exam    Weight Loss     Discuss    weight  loss    Knee Pain     Left knee      HPI:  Lynda William is here for a physical.   Has several concerns to address. GYN:  Follows with gyn at [facility]. On Pellet therapy  Testosterone level is low.    Wants to add estradiol back for fatigue     MAMMO  Last mammo 2021 and was normal.   due    COLON CA SCREEN:  Cscope 11/1/22  Dx with tubular adenomas in the past and was told to have cscope every 3yrs    ASTHMA:  Takes advair, singulair prn during allergy season  Saw pulm for lung nodule  Has follow up apt for CT 2/20/23 will see pulm in March. If stable, will be released. CHOL  Last     GERD:  H/o barretts. EGD due 11/2025  On nexium daily    NEPHRO  Referred due to h/o kidney stones, elevated PTH. No apt scheduled. +FH renal cell carcinoma (brother)    PTH  H/o elevated PTH  Was on thiazide for a while  Hasn't been on this for years. Would like to see nephro again to assess overall renal function. HM  Declines covid vaccines  Declines flu vaccine    WT  Referred to wt management by GI. Never went. Previous thyroid workup negative. Sits a lot. Not much exercise  Diet isn't great. Not interested in medication. KNEE  Complains of left knee pain x 2 mo  Started after doing leg presses at the gym  Next day knee was tight and has bulge in the back of knee and is tender  Whole leg will ache at times. Sharp pain in groin and radiates down leg. Tried ice. Whole leg hurts. Wears compression stockings while sitting at work. No longer on mobic. Feels stiff all over. ROS:  Review of Systems   Constitutional:  Positive for fatigue. Negative for activity change, appetite change, chills and unexpected weight change. HENT:  Positive for trouble swallowing. Negative for congestion, ear pain, hearing loss, nosebleeds, postnasal drip, sneezing and sore throat. Respiratory:  Negative for cough, chest tightness, shortness of breath and wheezing. H/o asthma, lung nodules, follows with pulm   Cardiovascular:  Negative for chest pain, palpitations (has LOOP recorder) and leg swelling. Gastrointestinal:  Positive for constipation. Negative for abdominal distention, abdominal pain, blood in stool, diarrhea, nausea and vomiting.         H/o esophageal stricture, colon polyps Endocrine: Positive for cold intolerance. H/o elevated PTH   Genitourinary:  Negative for difficulty urinating, dysuria, flank pain, hematuria and pelvic pain. H/o kidney stones   Musculoskeletal:  Positive for arthralgias (chronic neck/back pain), back pain and gait problem. Negative for neck pain. Skin:  Negative for rash. Neurological:  Negative for dizziness, tremors, numbness and headaches. Hematological:  Does not bruise/bleed easily. Psychiatric/Behavioral:  Negative for agitation and sleep disturbance. The patient is not nervous/anxious. VITALS:  /70   Pulse 68   Temp 98 °F (36.7 °C) (Oral)   Ht 5' 3\" (1.6 m)   Wt 239 lb (108.4 kg)   SpO2 97%   BMI 42.34 kg/m²    BP Readings from Last 3 Encounters:   02/13/23 120/70   11/01/22 101/74   10/10/22 (!) 151/60     Wt Readings from Last 3 Encounters:   02/13/23 239 lb (108.4 kg)   11/01/22 230 lb (104.3 kg)   03/09/22 231 lb (104.8 kg)     PE:  Physical Exam  Vitals and nursing note reviewed. Constitutional:       General: She is not in acute distress. Appearance: Normal appearance. She is well-developed and normal weight. She is not diaphoretic. Neck:      Thyroid: No thyromegaly. Vascular: No carotid bruit or JVD. Trachea: No tracheal deviation. Cardiovascular:      Rate and Rhythm: Normal rate and regular rhythm. Heart sounds: Normal heart sounds. No murmur heard. No friction rub. Pulmonary:      Effort: Pulmonary effort is normal. No respiratory distress. Breath sounds: Normal breath sounds. No stridor. No wheezing, rhonchi or rales. Abdominal:      General: Abdomen is flat. Bowel sounds are normal. There is no distension. Palpations: Abdomen is soft. There is no mass. Tenderness: There is abdominal tenderness (midepigastric-chronic). There is no guarding or rebound. Hernia: No hernia is present. Musculoskeletal:      Cervical back: Neck supple.       Left knee: Swelling (posterior knee) and crepitus present. No bony tenderness. Normal range of motion. Tenderness present. Skin:     General: Skin is warm and dry. Findings: No rash. Neurological:      General: No focal deficit present. Mental Status: She is alert and oriented to person, place, and time. Motor: No weakness. Gait: Gait normal.   Psychiatric:         Mood and Affect: Mood normal.         Behavior: Behavior normal.         Thought Content: Thought content normal.         Judgment: Judgment normal.        COMPREHENSIVE METABOLIC PANEL  Specimen:  Blood - Venous blood (substance)   Ref Range & Units 12 d ago Comments   Sodium 136 - 145 mmol/L 135 Low      Potassium 3.5 - 5.0 mmol/L 4.5     Chloride 98 - 107 mmol/L 103     Total CO2 22 - 29 mmol/L 24     Anion Gap 7 - 16 mmol/L 8     Calcium 8.6 - 10.4 mg/dL 9.4     Glucose Lvl 74 - 100 mg/dL 87     BUN 6 - 20 mg/dL 20     Creatinine 0.51 - 1.30 mg/dL 0.93     Albumin 3.5 - 5.2 gm/dL 3.9     Total Protein 6.4 - 8.3 gm/dL 6.6     Bili Total 0.1 - 1.3 mg/dL 0.2     ALT <=41 U/L 17     AST <=40 U/L 19     Alk Phos 36 - 123 U/L 55     eGFR (CKD-EPIcr 2021) >=60 mL/min/1.73 m2 75     CBC  Specimen:  Blood - Venous blood (substance)   Ref Range & Units 12 d ago   WBC 3.7 - 10.3 x10(3)/mcL 8.0    RBC 3.90 - 5.20 x10(6)/mcL 4.69    Hgb 11.2 - 15.7 g/dL 13.8    Hct 34.0 - 45.0 % 43.5    MCV 80.0 - 100.0 fL 92.8    MCH 26.0 - 34.0 pg 29.4    MCHC 30.7 - 35.5 g/dL 31.7    RDW <=14.9 % 12.2    Platelet 879 - 814 Z54(5)/    MPV 8.8 - 12.5 fL 10.2        ASSESSMENT/PLAN:  1. Annual physical exam  Will check fasting labs. Orders given. May complete at an outside facility  Mammo due  Cscope utd. Repeat in 2025  Declines flu and covid vaccines. - [facility] DIGITAL SCREEN W OR WO CAD BILATERAL; Future  - CBC; Future  - Comprehensive Metabolic Panel; Future  - Lipid Panel; Future    2.  Encounter for screening mammogram for malignant neoplasm of breast  - [facility] DIGITAL SCREEN W OR WO CAD BILATERAL; Future    3. Class 3 severe obesity without serious comorbidity with body mass index (BMI) of 40.0 to 44.9 in adult, unspecified obesity type ([address]  Discussed wt loss medications in detail  Patient not interested in medications at this time  Refer to wt management to discuss options. Will work on diet, healthy snacks and increasing activity level. - [employer] [facility] (Bariatric Surgery), [place]  - TSH; Future    4. Gastroesophageal reflux disease without esophagitis  Continue per GI  PPI lifetime  EGD every 3 years   - CBC; Future  - Comprehensive Metabolic Panel; Future  - Lipid Panel; Future    5. Mild persistent asthma without complication  Stable on current regimen    6. Mixed hyperlipidemia  Check fasting labs. - CBC; Future  - Comprehensive Metabolic Panel; Future  - Lipid Panel; Future    7. Chronic pain of left knee  Worsening. Check XR  Do not recommend daily nsaid at this time  Refer to Physical Therapy  Refer to ortho for further eval.   - XR KNEE LEFT (3 VIEWS); Future  - Devin Sepulveda MD, Orthopedics and Sports Medicine (Hip; Knee; Shoulder), [facility] [place] (Ortho & Sports)-[facility]    8. History of nephrolithiasis  Discussed nephro vs urology. Patient would like to see nephro to assess kidney function and h/o abnl PTH  - AFL(Epic) - Cary Louis MD, Nephrology, [facility]    9. Family history of renal cell carcinoma  - AFL(Epic) - Cary Louis MD, Nephrology, [facility]    10. Vitamin D deficiency  - Vitamin D 25 Hydroxy; Future    11. Fatigue, unspecified type  - Vitamin B12; Future    12. Polyp of colon, unspecified part of colon, unspecified type  Cscope every 3 years. Due 2025    13. Elevated parathyroid hormone  - PTH, Intact; Future  - TSH; Future    14. Lung nodules  Continue per pulm  Follow up CT scheduled. Return in about 6 months (around 8/13/2023).      Electronically signed by John Ariza, JEANNE - CNP on 2/13/2023 at 5:09 PM

## 2023-02-13 ENCOUNTER — OFFICE VISIT (OUTPATIENT)
Dept: PRIMARY CARE CLINIC | Age: 48
End: 2023-02-13
Payer: COMMERCIAL

## 2023-02-13 VITALS
TEMPERATURE: 98 F | OXYGEN SATURATION: 97 % | SYSTOLIC BLOOD PRESSURE: 120 MMHG | WEIGHT: 239 LBS | HEIGHT: 63 IN | HEART RATE: 68 BPM | BODY MASS INDEX: 42.35 KG/M2 | DIASTOLIC BLOOD PRESSURE: 70 MMHG

## 2023-02-13 DIAGNOSIS — R91.8 LUNG NODULES: ICD-10-CM

## 2023-02-13 DIAGNOSIS — Z87.442 HISTORY OF NEPHROLITHIASIS: ICD-10-CM

## 2023-02-13 DIAGNOSIS — G89.29 CHRONIC PAIN OF LEFT KNEE: ICD-10-CM

## 2023-02-13 DIAGNOSIS — K63.5 POLYP OF COLON, UNSPECIFIED PART OF COLON, UNSPECIFIED TYPE: ICD-10-CM

## 2023-02-13 DIAGNOSIS — E78.2 MIXED HYPERLIPIDEMIA: ICD-10-CM

## 2023-02-13 DIAGNOSIS — R53.83 FATIGUE, UNSPECIFIED TYPE: ICD-10-CM

## 2023-02-13 DIAGNOSIS — Z80.51 FAMILY HISTORY OF RENAL CELL CARCINOMA: ICD-10-CM

## 2023-02-13 DIAGNOSIS — M25.562 CHRONIC PAIN OF LEFT KNEE: ICD-10-CM

## 2023-02-13 DIAGNOSIS — Z12.31 ENCOUNTER FOR SCREENING MAMMOGRAM FOR MALIGNANT NEOPLASM OF BREAST: ICD-10-CM

## 2023-02-13 DIAGNOSIS — Z00.00 ANNUAL PHYSICAL EXAM: Primary | ICD-10-CM

## 2023-02-13 DIAGNOSIS — K21.9 GASTROESOPHAGEAL REFLUX DISEASE WITHOUT ESOPHAGITIS: ICD-10-CM

## 2023-02-13 DIAGNOSIS — E66.01 CLASS 3 SEVERE OBESITY WITHOUT SERIOUS COMORBIDITY WITH BODY MASS INDEX (BMI) OF 40.0 TO 44.9 IN ADULT, UNSPECIFIED OBESITY TYPE (HCC): ICD-10-CM

## 2023-02-13 DIAGNOSIS — E55.9 VITAMIN D DEFICIENCY: ICD-10-CM

## 2023-02-13 DIAGNOSIS — E34.9 ELEVATED PARATHYROID HORMONE: ICD-10-CM

## 2023-02-13 DIAGNOSIS — J45.30 MILD PERSISTENT ASTHMA WITHOUT COMPLICATION: ICD-10-CM

## 2023-02-13 PROBLEM — R79.89 ELEVATED PTHRP LEVEL: Status: ACTIVE | Noted: 2023-02-13

## 2023-02-13 PROBLEM — R79.89 ELEVATED PTHRP LEVEL: Status: RESOLVED | Noted: 2023-02-13 | Resolved: 2023-02-13

## 2023-02-13 PROBLEM — R79.89 ELEVATED PARATHYROID HORMONE: Status: ACTIVE | Noted: 2023-02-13

## 2023-02-13 PROBLEM — J45.909 ASTHMA: Status: RESOLVED | Noted: 2021-04-12 | Resolved: 2023-02-13

## 2023-02-13 PROCEDURE — 99396 PREV VISIT EST AGE 40-64: CPT | Performed by: NURSE PRACTITIONER

## 2023-02-13 PROCEDURE — 99213 OFFICE O/P EST LOW 20 MIN: CPT | Performed by: NURSE PRACTITIONER

## 2023-02-13 SDOH — ECONOMIC STABILITY: HOUSING INSECURITY
IN THE LAST 12 MONTHS, WAS THERE A TIME WHEN YOU DID NOT HAVE A STEADY PLACE TO SLEEP OR SLEPT IN A SHELTER (INCLUDING NOW)?: NO

## 2023-02-13 SDOH — ECONOMIC STABILITY: FOOD INSECURITY: WITHIN THE PAST 12 MONTHS, THE FOOD YOU BOUGHT JUST DIDN'T LAST AND YOU DIDN'T HAVE MONEY TO GET MORE.: NEVER TRUE

## 2023-02-13 SDOH — ECONOMIC STABILITY: INCOME INSECURITY: HOW HARD IS IT FOR YOU TO PAY FOR THE VERY BASICS LIKE FOOD, HOUSING, MEDICAL CARE, AND HEATING?: NOT HARD AT ALL

## 2023-02-13 SDOH — ECONOMIC STABILITY: FOOD INSECURITY: WITHIN THE PAST 12 MONTHS, YOU WORRIED THAT YOUR FOOD WOULD RUN OUT BEFORE YOU GOT MONEY TO BUY MORE.: NEVER TRUE

## 2023-02-13 ASSESSMENT — ENCOUNTER SYMPTOMS
TROUBLE SWALLOWING: 1
VOMITING: 0
CONSTIPATION: 1
NAUSEA: 0
COUGH: 0
ABDOMINAL PAIN: 0
WHEEZING: 0
DIARRHEA: 0
CHEST TIGHTNESS: 0
BACK PAIN: 1
BLOOD IN STOOL: 0
SHORTNESS OF BREATH: 0
ABDOMINAL DISTENTION: 0
SORE THROAT: 0

## 2023-02-17 DIAGNOSIS — E78.2 MIXED HYPERLIPIDEMIA: ICD-10-CM

## 2023-02-17 DIAGNOSIS — Z00.00 ANNUAL PHYSICAL EXAM: ICD-10-CM

## 2023-02-17 DIAGNOSIS — R53.83 FATIGUE, UNSPECIFIED TYPE: ICD-10-CM

## 2023-02-17 DIAGNOSIS — E55.9 VITAMIN D DEFICIENCY: ICD-10-CM

## 2023-02-17 DIAGNOSIS — K21.9 GASTROESOPHAGEAL REFLUX DISEASE WITHOUT ESOPHAGITIS: ICD-10-CM

## 2023-02-17 DIAGNOSIS — E66.01 CLASS 3 SEVERE OBESITY WITHOUT SERIOUS COMORBIDITY WITH BODY MASS INDEX (BMI) OF 40.0 TO 44.9 IN ADULT, UNSPECIFIED OBESITY TYPE (HCC): ICD-10-CM

## 2023-02-17 DIAGNOSIS — E34.9 ELEVATED PARATHYROID HORMONE: ICD-10-CM

## 2023-02-17 LAB
A/G RATIO: 1.6 (ref 1.1–2.2)
ALBUMIN SERPL-MCNC: 4.4 G/DL (ref 3.4–5)
ALP BLD-CCNC: 57 U/L (ref 40–129)
ALT SERPL-CCNC: 22 U/L (ref 10–40)
ANION GAP SERPL CALCULATED.3IONS-SCNC: 14 MMOL/L (ref 3–16)
AST SERPL-CCNC: 17 U/L (ref 15–37)
BILIRUB SERPL-MCNC: 0.3 MG/DL (ref 0–1)
BUN BLDV-MCNC: 20 MG/DL (ref 7–20)
CALCIUM SERPL-MCNC: 9.8 MG/DL (ref 8.3–10.6)
CHLORIDE BLD-SCNC: 103 MMOL/L (ref 99–110)
CHOLESTEROL, TOTAL: 230 MG/DL (ref 0–199)
CO2: 25 MMOL/L (ref 21–32)
CREAT SERPL-MCNC: 0.9 MG/DL (ref 0.6–1.1)
GFR SERPL CREATININE-BSD FRML MDRD: >60 ML/MIN/{1.73_M2}
GLUCOSE BLD-MCNC: 106 MG/DL (ref 70–99)
HCT VFR BLD CALC: 43.8 % (ref 36–48)
HDLC SERPL-MCNC: 53 MG/DL (ref 40–60)
HEMOGLOBIN: 14.9 G/DL (ref 12–16)
LDL CHOLESTEROL CALCULATED: 152 MG/DL
MCH RBC QN AUTO: 30.4 PG (ref 26–34)
MCHC RBC AUTO-ENTMCNC: 34 G/DL (ref 31–36)
MCV RBC AUTO: 89.3 FL (ref 80–100)
PARATHYROID HORMONE INTACT: 73.3 PG/ML (ref 14–72)
PDW BLD-RTO: 13 % (ref 12.4–15.4)
PLATELET # BLD: 315 K/UL (ref 135–450)
PMV BLD AUTO: 9.4 FL (ref 5–10.5)
POTASSIUM SERPL-SCNC: 4.5 MMOL/L (ref 3.5–5.1)
RBC # BLD: 4.9 M/UL (ref 4–5.2)
SODIUM BLD-SCNC: 142 MMOL/L (ref 136–145)
TOTAL PROTEIN: 7.2 G/DL (ref 6.4–8.2)
TRIGL SERPL-MCNC: 127 MG/DL (ref 0–150)
TSH SERPL DL<=0.05 MIU/L-ACNC: 0.89 UIU/ML (ref 0.27–4.2)
VITAMIN B-12: 478 PG/ML (ref 211–911)
VITAMIN D 25-HYDROXY: 63.4 NG/ML
VLDLC SERPL CALC-MCNC: 25 MG/DL
WBC # BLD: 5.9 K/UL (ref 4–11)

## 2023-02-20 DIAGNOSIS — E78.2 MIXED HYPERLIPIDEMIA: ICD-10-CM

## 2023-02-20 DIAGNOSIS — R73.9 HYPERGLYCEMIA: ICD-10-CM

## 2023-02-20 DIAGNOSIS — E34.9 ELEVATED PARATHYROID HORMONE: Primary | ICD-10-CM

## 2023-02-20 RX ORDER — ROSUVASTATIN CALCIUM 10 MG/1
10 TABLET, COATED ORAL NIGHTLY
Qty: 30 TABLET | Refills: 2 | Status: SHIPPED | OUTPATIENT
Start: 2023-02-20

## 2023-02-21 ENCOUNTER — EVALUATION (OUTPATIENT)
Dept: PHYSICAL THERAPY | Age: 48
End: 2023-02-21
Payer: COMMERCIAL

## 2023-02-21 DIAGNOSIS — M25.662 DECREASED ROM OF LEFT KNEE: ICD-10-CM

## 2023-02-21 DIAGNOSIS — M25.562 LEFT KNEE PAIN, UNSPECIFIED CHRONICITY: Primary | ICD-10-CM

## 2023-02-21 LAB
ESTIMATED AVERAGE GLUCOSE: 105.4 MG/DL
HBA1C MFR BLD: 5.3 %

## 2023-02-21 PROCEDURE — 97112 NEUROMUSCULAR REEDUCATION: CPT | Performed by: PHYSICAL THERAPIST

## 2023-02-21 PROCEDURE — 97110 THERAPEUTIC EXERCISES: CPT | Performed by: PHYSICAL THERAPIST

## 2023-02-21 PROCEDURE — 97161 PT EVAL LOW COMPLEX 20 MIN: CPT | Performed by: PHYSICAL THERAPIST

## 2023-02-21 NOTE — PROGRESS NOTES
[facility]   Phone: [phone]    Fax: [phone]     Physical Therapy Certification    Dear Referring Practitioner: ANICETO Torres,    We had the pleasure of evaluating the following patient for physical therapy services at [address] [place] [address]. A summary of our findings can be found in the initial assessment below. This includes our plan of care. If you have any questions or concerns regarding these findings, please do not hesitate to contact me at the office phone number checked above. Thank you for the referral.       Physician Signature:_______________________________Date:__________________  By signing above (or electronic signature), therapists plan is approved by physician      Patient: Yadiel Reeder   : 1975   MRN: [id number]  Referring Physician: Referring Practitioner: ANICETO Torres      Evaluation Date: 2023      Medical Diagnosis Information:  Diagnosis: L knee pain (chronic)                                             Insurance information: PT Insurance Information: BCBS     Precautions/ Contra-indications: OA, hx of L meniscus injury  Latex Allergy:  [x]NO      []YES    C-SSRS Triggered by Intake questionnaire (Past 2 wk assessment):   [x] No, Questionnaire did not trigger screening.   [] Yes, Patient intake triggered further evaluation      [] C-SSRS Screening completed  [] PCP notified via Plan of Care  [] Emergency services notified       Preferred Language for Healthcare:   [x]English       []other:      SUBJECTIVE: Patient stated complaint: Patient reports that she has ongoing L LE and knee pain for several months. She states that she was going a leg press in the gym and felt like all of a sudden there was an elastic band around it and felt very tight. She states that 2 years ago she had laser vein surgery that seemed to aggravate pain in the L LE as well as the right.  She notes swelling and irritation in the back of the L knee. She reports significant pain and difficulty with squatting and going up/down stairs. She is doing steps with a s step to pattern. She feels full L LE weakness and sensation of buckling with \"tooth ache\" pain. She notes that L knee pain tends to be in the medial aspect of the L knee. Patient does report a hx of meniscus injury in L knee many years ago.     Relevant Medical History: OA, osteoporosis/osteopenia, Hx of prior L meniscus injury  Functional Scale/Score: LEFS: 29/80, 63.75% limitation    Pain Scale: 6/10 constant; 9-10/10 at worst  Easing factors: rest, ice, advil PRN  Provocative factors: squatting, ascending/descending stairs, prolonged walking/standing, prolonged sitting     Type: [x]Constant   []Intermittent  []Radiating []Localized []other:     Numbness/Tingling: Patient denies    Occupation/School:Patient is employed full time as a /supervisor    Living Status/Prior Level of Function: Independent with ADLs and IADLs,   Patient lives in 2 story home with basement (2 flights of stairs), lives with  and 2 grown children    OBJECTIVE:     Flexibility L R Comment:  2/21/2023   Hamstring Fair     Gastroc Fair     ITB      Quad                ROM PROM AROM Comment:  2/21/2023    L R L R    Flexion   120° 120°    Extension   -4° 0°                        Strength L R Comment:  2/21/2023   Quad 4/5 5/5    Hamstring 4/5 5/5    Gastroc      Hip abd 4+/5 4+/5 In sitting   Hip Add 4+/5 4+/5 In sitting     Girth:  2/21/2023 L R   Mid Patella 46 cm 46.5 cm   Suprapatellar 51 cm 49 cm       Special Test Results/Comment:  2/21/2023   Dyana Negative on L   Crepitus Positive B   Valgus Laxity Negative on L   Varus Laxity Negative on L   Anterior drawer Negative on L   Drop Back Negative on L            Reflexes/Sensation:    []Dermatomes/Myotomes intact    []Reflexes equal and normal bilaterally   [x]Other: sensation intact to light touch    Joint mobility: []Normal    [x]Hypo   []Hyper    Palpation: +2-3 TTP along medial L joint line, MCL and posterior medial knee    Functional Mobility/Transfers: patient is limited with sitting, standing, and walking tolerance secondary to L knee pain impacting ADLs    Posture: forward flexed posture    Bandages/Dressings/Incisions: n/a    Gait: (include devices/WB status) WNL (mild, intermittent antalgic gait noted)    Orthopedic Special Tests: see above                       [x] Patient history, allergies, meds reviewed. Medical chart reviewed. See intake form. Review Of Systems (ROS):  [x]Performed Review of systems (Integumentary, CardioPulmonary, Neurological) by intake and observation. Intake form has been scanned into medical record. Patient has been instructed to contact their primary care physician regarding ROS issues if not already being addressed at this time.       Co-morbidities/Complexities (which will affect course of rehabilitation):   []None           Arthritic conditions   []Rheumatoid arthritis (M05.9)  [x]Osteoarthritis (M19.91)   Cardiovascular conditions   []Hypertension (I10)  []Hyperlipidemia (E78.5)  []Angina pectoris (I20)  []Atherosclerosis (I70)  []CVA Musculoskeletal conditions   []Disc pathology   []Congenital spine pathologies   []Prior surgical intervention  [x]Osteoporosis (M81.8)  [x]Osteopenia (M85.8)   Endocrine conditions   []Hypothyroid (E03.9)  []Hyperthyroid Gastrointestinal conditions   []Constipation (C44.20)   Metabolic conditions   []Morbid obesity (E66.01)  []Diabetes type 1(E10.65) or 2 (E11.65)   []Neuropathy (G60.9)     Pulmonary conditions   []Asthma (J45)  []Coughing   []COPD (J44.9)   Psychological Disorders  []Anxiety (F41.9)  []Depression (F32.9)   []Other:   [x]Other:  Hx of L knee meniscus injury, see chart for full surgical hx, hx of hiatal hernia        Barriers to/and or personal factors that will affect rehab potential:              []Age  []Sex    []Smoker  [x]Motivation/Lack of Motivation: Patient is highly motivated                         [x]Co-Morbidities: obesity, OA, hx of meniscal injury in L knee, patient is having tests completed with endocrinologist               []Cognitive Function, education/learning barriers              []Environmental, home barriers              []profession/work barriers  [x]past PT/medical experience: Patient demonstrates good understanding of POC  []other:  Justification: see above            ASSESSMENT:   Functional Impairments:     []Noted lumbar/proximal hip/LE hypomobility   [x]Decreased LE functional ROM   []Decreased core/proximal hip strength and neuromuscular control   [x]Decreased LE functional strength   [x]Reduced balance/proprioceptive control   []other:      Functional Activity Limitations (from functional questionnaire and intake)   [x]Reduced ability to tolerate prolonged functional positions   [x]Reduced ability or difficulty with changes of positions or transfers between positions   []Reduced ability to maintain good posture and demonstrate good body mechanics with sitting, bending, and lifting   [x]Reduced ability to sleep   [] Reduced ability or tolerance with driving and/or computer work   []Reduced ability to perform lifting, carrying tasks   [x]Reduced ability to squat   [x]Reduced ability to forward bend   [x]Reduced ability to ambulate prolonged functional periods/distances/surfaces   [x]Reduced ability to ascend/descend stairs   []Reduced ability to run, hop or jump   []other:     Participation Restrictions   [x]Reduced participation in self care activities   [x]Reduced participation in home management activities   [x]Reduced participation in work activities   [x]Reduced participation in social activities.   []Reduced participation in sport activities.    Classification :    []Signs/symptoms consistent with post-surgical status including decreased ROM, strength and function.   [x]Signs/symptoms consistent  with joint sprain/strain   []Signs/symptoms consistent with patella-femoral syndrome   [x]Signs/symptoms consistent with knee OA/hip OA   []Signs/symptoms consistent with internal derangement of knee/Hip   [x]Signs/symptoms consistent with functional hip weakness/NMR control      []Signs/symptoms consistent with tendinitis/tendinosis    []signs/symptoms consistent with pathology which may benefit from Dry needling      []other:      Prognosis/Rehab Potential:      []Excellent   [x]Good    []Fair   []Poor    Tolerance of evaluation/treatment:    []Excellent   []Good    [x]Fair   []Poor    Physical Therapy Evaluation Complexity Justification  [x] A history of present problem with:  [] no personal factors and/or comorbidities that impact the plan of care;  []1-2 personal factors and/or comorbidities that impact the plan of care  [x]3 personal factors and/or comorbidities that impact the plan of care  [x] An examination of body systems using standardized tests and measures addressing any of the following: body structures and functions (impairments), activity limitations, and/or participation restrictions;:  [] a total of 1-2 or more elements   [x] a total of 3 or more elements   [] a total of 4 or more elements   [x] A clinical presentation with:  [x] stable and/or uncomplicated characteristics   [] evolving clinical presentation with changing characteristics  [] unstable and unpredictable characteristics;   [x] Clinical decision making of [x] low, [] moderate, [] high complexity using standardized patient assessment instrument and/or measurable assessment of functional outcome.     [x] EVAL (LOW) 85279 (typically 20 minutes face-to-face)  [] EVAL (MOD) 90685 (typically 30 minutes face-to-face)  [] EVAL (HIGH) 83904 (typically 45 minutes face-to-face)  [] RE-EVAL     PLAN:  Frequency/Duration:  1-2 days per week for 4-6 Weeks:  Interventions:  [x]  Therapeutic exercise including: strength training, ROM, for Lower extremity and core   [x]  NMR activation and proprioception for LE, Glutes and Core   [x]  Manual therapy as indicated for LE, Hip and spine to include: Dry Needling/IASTM, STM, PROM, Gr I-IV mobilizations, manipulation. [x] Modalities as needed that may include: thermal agents, E-stim, Biofeedback, US, iontophoresis as indicated  [x] Patient education on joint protection, postural re-education, activity modification, progression of HEP. HEP instruction: Patient returned proper demonstration of HEP. Issued patient written program clearly stating sets/reps/sessions per day. Written program was scanned into media section of medical record. (see scanned forms)    GOALS:  Patient stated goal: Improve strength and decrease pain in L knee with ADLs  [] Progressing: [] Met: [] Not Met: [] Adjusted    Therapist goals for Patient:   Short Term Goals: To be achieved in: 2 weeks  1. Independent in HEP and progression per patient tolerance, in order to prevent re-injury. [] Progressing: [] Met: [] Not Met: [] Adjusted  2. Patient will have a decrease in pain to facilitate improvement in movement, function, and ADLs as indicated by Functional Deficits. [] Progressing: [] Met: [] Not Met: [] Adjusted    Long Term Goals: To be achieved in: 6 weeks  1. Disability index score of 30% or less on LEFS to assist with reaching prior level of function. [] Progressing: [] Met: [] Not Met: [] Adjusted  2. Patient will demonstrate increased L knee extension AROM to 0° to allow for proper joint functioning as indicated by patients Functional Deficits. [] Progressing: [] Met: [] Not Met: [] Adjusted  3. Patient will demonstrate an increase in L quad and HS strength to 4+/5 or greater without pain to allow for proper functional mobility as indicated by patients Functional Deficits. [] Progressing: [] Met: [] Not Met: [] Adjusted  4.  Patient will return to ascending/descending 1 flight of stairs with reciprocal gait pattern with 2/10 pain or less in L knee so that she may return to PLOF for ADLs in home. [] Progressing: [] Met: [] Not Met: [] Adjusted  5. Patient will be able to stand/walk >30 minutes with 2/10 pain or less in L knee so that she may return to PLOF for ADLs.   [] Progressing: [] Met: [] Not Met: [] Adjusted     Electronically signed by:  Marjorie Dickerson, PT, DPT, OCS, OMT-C    Board-Certified Orthopaedic Clinical Specialist    [address] [address] PT license: [id number]  [place] PT license: [id number]

## 2023-02-21 NOTE — PROGRESS NOTES
Michael Saez [facility]   Phone: [phone]    Fax: [phone]      Physical Therapy Treatment Note/ Progress Report:           Date:  2023    Patient Name:  Iona Pritchard    :  1975  MRN: [id number]  Restrictions/Precautions:    Medical/Treatment Diagnosis Information:  Diagnosis: L knee pain (chronic)     Insurance/Certification information:  PT Insurance Information: BCBS  Physician Information:  Referring Practitioner: ANICETO Campbell  Has the plan of care been signed (Y/N):        []  Yes  [x]  No     Date of Patient follow up with Physician: PRN      Is this a Progress Report:     [x]  Yes  []  No        If Yes:  Date Range for reporting period:  Beginnin2023  Ending: 3/21/2023    Progress report will be due (10 Rx or 30 days whichever is less):        Recertification will be due (POC Duration  / 90 days whichever is less): 3/21/2023         Visit # Insurance Allowable Auth Required   1 30  Hard max []  Yes [x]  No        Functional Scale: LEFS: 29/80, 63.75% limitation   Date assessed:  2023      Latex Allergy:  [x]NO      []YES  Preferred Language for Healthcare:   [x]English       []other:      Pain level:  6/10 constant; 9-10/10 at worst    SUBJECTIVE:  See eval    OBJECTIVE: See eval    Flexibility L R Comment:  2023   Hamstring Fair       Gastroc Fair       ITB         Quad                                 ROM PROM AROM Comment:  2023     L R L R     Flexion     120° 120°     Extension     -4° 0°                                       Strength L R Comment:  2023   Quad 4/5 5/5     Hamstring 4/5 5/5     Gastroc         Hip abd 4+/5 4+/5 In sitting   Hip Add 4+/5 4+/5 In sitting      Girth:  2023 L R   Mid Patella 46 cm 46.5 cm   Suprapatellar 51 cm 49 cm         Special Test Results/Comment:  2023   Dyana Negative on L   Crepitus Positive B   Valgus Laxity Negative on L   Varus Laxity Negative on L   Anterior drawer Negative on L   Drop Back Negative on L        RESTRICTIONS/PRECAUTIONS: OA, Hx of L meniscus injury    Exercises/Interventions:     Therapeutic Ex (05214) Sets/sec Reps Notes/CUES   BIKE      TREADMILL            STRETCHING      Towel Pull Calf 20\" x5    Inclined Calf      Hamstrings 20\" x5    Quads      Hip Flexors      ITB      Adductors            ROM      Passive      Active      Weight Hangs      Sheet Pulls      Ankle Pumps      ERMI            STRENGTHENING      Quad Sets 10\" x10 Toe out   Ham Sets      Hip ADD Sets BS 10\" x10    SLR Flexion 2 x10 Toe out   SLR Abduction      SLR Prone      SLR Adduction      SLR+      Clam shells 2 x10                      PRE Machines      Knee Extension      Knee Flexion      Leg Press            CKC      Calf Raises      Mini Squats      Wall Sits      Step ups      TKE                  Manual Intervention (46592)      Patellar Mobs      Femoral Tibial mobs      STM      Scar Massage      Foam Roll            NMR re-education (47377)   CUES NEEDED   Biodex Balance      Tandem Balance      SLB      Rebounder      BOSU                              Therapeutic Activity (70380)      Core Training      [employer] Activities      Monster Walks      TRX training                            Therapeutic Exercise and NMR EXR  [x] (55178) Provided verbal/tactile cueing for activities related to strengthening, flexibility, endurance, ROM for improvements in LE, proximal hip, and core control with self care, mobility, lifting, ambulation. [x] (84998) Provided verbal/tactile cueing for activities related to improving balance, coordination, kinesthetic sense, posture, motor skill, proprioception to assist with LE, proximal hip, and core control in self-care, mobility, lifting, ambulation and eccentric single leg control.      NMR and Therapeutic Activities:    [x] (23189 or 51674) Provided verbal/tactile cueing for activities related to improving balance, coordination, kinesthetic sense, posture, motor skill, proprioception and motor activation to allow for proper function of core, proximal hip and LE with self-care and ADLs and functional mobility.   [] (88750) Gait Re-education- Provided training and instruction to the patient for proper LE, core and proximal hip recruitment and positioning and eccentric body weight control with ambulation re-education including up and down stairs     Home Exercise Program:    [x] (75515) Reviewed/Progressed HEP activities related to strengthening, flexibility, endurance, ROM of core, proximal hip and LE for functional self-care, mobility, lifting and ambulation/stair navigation     Access Code: [email or web address]  URL: [email or web address]  Date: 02/21/2023  Prepared by: Wade Hamilton    Exercises  Seated Table Hamstring Stretch - 1-2 x daily - 5-7 x weekly - 1 sets - 5 reps - 20 seconds hold  Long Sitting Calf Stretch with Strap - 1-2 x daily - 5-7 x weekly - 1 sets - 5 reps - 20 seconds hold  Long Sitting Quad Set - 1-2 x daily - 5-7 x weekly - 1 sets - 10 reps - 10 seconds hold  Supine Hip Adduction Isometric with Ball - 1-2 x daily - 5-7 x weekly - 1 sets - 10 reps - 10 seconds hold  Supine Active Straight Leg Raise (Mirrored) - 1 x daily - 5-7 x weekly - 2 sets - 10 reps  Clamshell - 1-2 x daily - 5-7 x weekly - 2 sets - 10 reps        [] (56345) Reviewed/Progressed HEP activities related to improving balance, coordination, kinesthetic sense, posture, motor skill, proprioception of core, proximal hip and LE for self-care, mobility, lifting, and ambulation/stair navigation      Manual Treatments:  PROM / STM / Oscillations-Mobs:  G-I, II, III, IV (PA's, Inf., Post.)  [] (20299) Provided manual therapy to mobilize LE, proximal hip and/or LS spine soft tissue/joints for the purpose of modulating pain, promoting relaxation, increasing ROM, reducing/eliminating soft tissue swelling/inflammation/restriction, improving soft tissue extensibility and allowing for proper ROM for normal function with self-care, mobility, lifting and ambulation. Modalities:     [] GAME READY (VASO)- for significant edema, swelling, pain control. Charges:  Timed Code Treatment Minutes: 30   Total Treatment Minutes: 55      [x] EVAL (LOW) 19943 (typically 20 minutes face-to-face) (25')  [] EVAL (MOD) 77063 (typically 30 minutes face-to-face)  [] EVAL (HIGH) 68559 (typically 45 minutes face-to-face)  [] RE-EVAL     [x] PZ(66842) x 1 (18') [] IONTO  [x] NMR (72820) x 1 (12') [] VASO  [] Manual (94965) x     [] Other:  [] TA x      [] Mech Traction (68321)  [] ES(attended) (24999)      [] ES (un) (73160):         ASSESSMENT:  See eval      GOALS:     Patient stated goal: Improve strength and decrease pain in L knee with ADLs  [] Progressing: [] Met: [] Not Met: [] Adjusted    Therapist goals for Patient:   Short Term Goals: To be achieved in: 2 weeks  1. Independent in HEP and progression per patient tolerance, in order to prevent re-injury. [] Progressing: [] Met: [] Not Met: [] Adjusted  2. Patient will have a decrease in pain to facilitate improvement in movement, function, and ADLs as indicated by Functional Deficits. [] Progressing: [] Met: [] Not Met: [] Adjusted    Long Term Goals: To be achieved in: 6 weeks  1. Disability index score of 30% or less on LEFS to assist with reaching prior level of function. [] Progressing: [] Met: [] Not Met: [] Adjusted  2. Patient will demonstrate increased L knee extension AROM to 0° to allow for proper joint functioning as indicated by patients Functional Deficits. [] Progressing: [] Met: [] Not Met: [] Adjusted  3. Patient will demonstrate an increase in L quad and HS strength to 4+/5 or greater without pain to allow for proper functional mobility as indicated by patients Functional Deficits. [] Progressing: [] Met: [] Not Met: [] Adjusted  4.  Patient will return to ascending/descending 1 flight of stairs with reciprocal gait pattern with 2/10 pain or less in L knee so that she may return to PLOF for ADLs in home. [] Progressing: [] Met: [] Not Met: [] Adjusted  5. Patient will be able to stand/walk >30 minutes with 2/10 pain or less in L knee so that she may return to PLOF for ADLs. [] Progressing: [] Met: [] Not Met: [] Adjusted       Overall Progression Towards Functional goals/ Treatment Progress Update:  [] Patient is progressing as expected towards functional goals listed. [] Progression is slowed due to complexities/Impairments listed. [] Progression has been slowed due to co-morbidities. [x] Plan just implemented, too soon to assess goals progression <30days   [] Goals require adjustment due to lack of progress  [] Patient is not progressing as expected and requires additional follow up with physician  [] Other    Prognosis for POC: [x] Good [] Fair  [] Poor      Patient requires continued skilled intervention: [x] Yes  [] No    Treatment/Activity Tolerance:  [x] Patient able to complete treatment  [] Patient limited by fatigue  [] Patient limited by pain    [] Patient limited by other medical complications  [] Other:         PLAN: See eval  [] Continue per plan of care [] Alter current plan   [x] Plan of care initiated [] Hold pending MD visit [] Discharge      Electronically signed by:  Italo Guido PT, DPT, OCS, OMT-C    Board-Certified Orthopaedic Clinical Specialist    [address] [address] PT license: [id number]  [place] PT license: [id number]      Note: If patient does not return for scheduled/ recommended follow up visits, this note will serve as a discharge from care along with most recent update on progress.

## 2023-03-03 ENCOUNTER — TREATMENT (OUTPATIENT)
Dept: PHYSICAL THERAPY | Age: 48
End: 2023-03-03

## 2023-03-03 NOTE — PROGRESS NOTES
Michael Saez [facility]   Phone: [phone]    Fax: [phone]            Physical Therapy  Cancellation/No-show Note  Patient Name:  Eulogio Reddy  :  1975   Date:  3/3/2023  Cancelled visits to date: 0  No-shows to date: 1    For today's appointment patient:  []  Cancelled  []  Rescheduled appointment  [x]  No-show     Reason given by patient:  []  Patient ill  []  Conflicting appointment  []  No transportation    []  Conflict with work  []  No reason given  [x]  Other: still traveling     Comments:      Phone call information:   [x]  Phone call made today to patient at 9:50 a.m. at number provided:      [x]  Patient answered, conversation as follows: [facility] office called patient to alert her to missed visit. Patient states that she had gotten her travel dates wrong and is still traveling home from her vacation. []  Patient did not answer, message left as follows:  []  Phone call not made today  []  Phone call not needed - pt contacted us to cancel and provided reason for cancellation.      Electronically signed by:  Alice Dangelo, PT, DPT, OCS, OMT-C    Board-Certified Orthopaedic Clinical Specialist    [address] [address] PT license: [id number]  [place] PT license: [id number]

## 2023-03-07 ENCOUNTER — TREATMENT (OUTPATIENT)
Dept: PHYSICAL THERAPY | Age: 48
End: 2023-03-07
Payer: COMMERCIAL

## 2023-03-07 DIAGNOSIS — M25.662 DECREASED ROM OF LEFT KNEE: ICD-10-CM

## 2023-03-07 DIAGNOSIS — M25.562 LEFT KNEE PAIN, UNSPECIFIED CHRONICITY: Primary | ICD-10-CM

## 2023-03-07 PROCEDURE — 97016 VASOPNEUMATIC DEVICE THERAPY: CPT | Performed by: PHYSICAL THERAPIST

## 2023-03-07 PROCEDURE — 97112 NEUROMUSCULAR REEDUCATION: CPT | Performed by: PHYSICAL THERAPIST

## 2023-03-07 PROCEDURE — 97530 THERAPEUTIC ACTIVITIES: CPT | Performed by: PHYSICAL THERAPIST

## 2023-03-07 PROCEDURE — 97110 THERAPEUTIC EXERCISES: CPT | Performed by: PHYSICAL THERAPIST

## 2023-03-07 NOTE — PROGRESS NOTES
[facility]   Phone: [phone]    Fax: [phone]      Physical Therapy Treatment Note/ Progress Report:           Date:  3/7/2023    Patient Name:  Jennifer Baldwin    :  1975  MRN: [id number]  Restrictions/Precautions:    Medical/Treatment Diagnosis Information:  Diagnosis: L knee pain (chronic)     Insurance/Certification information:  PT Insurance Information: BCBS  Physician Information:  Referring Practitioner: ANICETO Kohli  Has the plan of care been signed (Y/N):        [x]  Yes  []  No     Date of Patient follow up with Physician: PRN      Is this a Progress Report:     []  Yes  [x]  No        If Yes:  Date Range for reporting period:  Beginnin2023  Ending: 3/21/2023    Progress report will be due (10 Rx or 30 days whichever is less):        Recertification will be due (POC Duration  / 90 days whichever is less): 3/21/2023         Visit # Insurance Allowable Auth Required   2 30  Hard max []  Yes [x]  No        Functional Scale: LEFS: 29/80, 63.75% limitation   Date assessed:  2023      Latex Allergy:  [x]NO      []YES  Preferred Language for Healthcare:   [x]English       []other:      Pain level:  0/10 constant; 9-10/10 at worst    SUBJECTIVE:  Patient reports no pain upon arriving to physical therapy today. She was in [facility] for the last week where she did a lot of walking and climbing stairs. She reports that her knee would feel better once she got walking. The morning after a long walk her knee was irritated and felt \"stuck\" for a few minutes, this caused her pain to be a 10/10, after she began to move this improved. She was not able to do her HEP consistently when on vacation last week.     OBJECTIVE: See eval    Flexibility L R Comment:  2023   Hamstring Fair       Gastroc Fair       ITB         Quad                                 ROM PROM AROM Comment:  2023     L R L R     Flexion     120° 120° Extension     -4° 0°                                       Strength L R Comment:  3/7/2023   Quad 4/5 5/5     Hamstring 4/5 5/5     Gastroc         Posterior Tib 4/5*     Hip abd 4+/5 4+/5 In sitting   Hip Add 4+/5 4+/5 In sitting      Girth:  3/7/2023 L R   Mid Patella 48 cm 46.5 cm   Suprapatellar 54 cm 49 cm         Special Test Results/Comment:  2/21/2023   Dyana Negative on L   Crepitus Positive B   Valgus Laxity Negative on L   Varus Laxity Negative on L   Anterior drawer Negative on L   Drop Back Negative on L        RESTRICTIONS/PRECAUTIONS: OA, Hx of L meniscus injury    Exercises/Interventions:     Therapeutic Ex (75747) Sets/sec Reps Notes/CUES   BIKE      TREADMILL            STRETCHING      Towel Pull Calf 20\" x5    Inclined Calf      Hamstrings 20\" x5    Quads      Hip Flexors      ITB      Adductors            ROM      Passive      Active      Weight Hangs      Sheet Pulls      Ankle Pumps      ERMI            STRENGTHENING      Quad Sets 10\" x10 Toe out   Ham Sets      Hip ADD Sets BS 10\" x10    SLR Flexion 2 x10 Toe out   SLR Abduction      SLR Prone      SLR Adduction      SLR+      Clam shells 2 x10    Bridges 2 x10                PRE Machines      Knee Extension      Knee Flexion      Leg Press            CKC      Calf Raises      Mini Squats 2 x10    Wall Sits      Step ups      TKE                  Manual Intervention (34523)      Patellar Mobs      Femoral Tibial mobs      STM      Scar Massage      Foam Roll            NMR re-education (98221)   CUES NEEDED   Biodex Balance      Tandem Balance      SLB      Rebounder      BOSU      TA activation 5\" x10                      Therapeutic Activity (60858)      Core Training      [employer] Activities      Monster Walks      TRX training      Education 8'  Educated patient on affects of swelling with pain and stiffness, appropriate use of massage, appropriate strength training, and posterior tibialis tendinopathy pathology Therapeutic Exercise and NMR EXR  [x] (05103) Provided verbal/tactile cueing for activities related to strengthening, flexibility, endurance, ROM for improvements in LE, proximal hip, and core control with self care, mobility, lifting, ambulation. [x] (54356) Provided verbal/tactile cueing for activities related to improving balance, coordination, kinesthetic sense, posture, motor skill, proprioception to assist with LE, proximal hip, and core control in self-care, mobility, lifting, ambulation and eccentric single leg control. NMR and Therapeutic Activities:    [x] (67707 or 67982) Provided verbal/tactile cueing for activities related to improving balance, coordination, kinesthetic sense, posture, motor skill, proprioception and motor activation to allow for proper function of core, proximal hip and LE with self-care and ADLs and functional mobility.   [] (60994) Gait Re-education- Provided training and instruction to the patient for proper LE, core and proximal hip recruitment and positioning and eccentric body weight control with ambulation re-education including up and down stairs     Home Exercise Program:    [x] (70346) Reviewed/Progressed HEP activities related to strengthening, flexibility, endurance, ROM of core, proximal hip and LE for functional self-care, mobility, lifting and ambulation/stair navigation     Access Code: [email or web address]  URL: [email or web address]  Date: 03/07/2023  Prepared by: Steven Chavez    Exercises  Seated Table Hamstring Stretch - 1-2 x daily - 5-7 x weekly - 1 sets - 5 reps - 20 seconds hold  Long Sitting Calf Stretch with Strap - 1-2 x daily - 5-7 x weekly - 1 sets - 5 reps - 20 seconds hold  Long Sitting Quad Set - 1-2 x daily - 5-7 x weekly - 1 sets - 10 reps - 10 seconds hold  Supine Hip Adduction Isometric with Ball - 1-2 x daily - 5-7 x weekly - 1 sets - 10 reps - 10 seconds hold  Supine Active Straight Leg Raise (Mirrored) - 1 x daily - 5 x weekly - 2 sets - 10 reps  Clamshell - 1-2 x daily - 5-7 x weekly - 2 sets - 10 reps  Supine Bridge - 1 x daily - 5 x weekly - 2 sets - 10 reps  Supine Transversus Abdominis Bracing - Hands on Stomach - 1-2 x daily - 5-7 x weekly - 1 sets - 10 reps - 10 seconds hold      [] (44220) Reviewed/Progressed HEP activities related to improving balance, coordination, kinesthetic sense, posture, motor skill, proprioception of core, proximal hip and LE for self-care, mobility, lifting, and ambulation/stair navigation      Manual Treatments:  PROM / STM / Oscillations-Mobs:  G-I, II, III, IV (PA's, Inf., Post.)  [] (57545) Provided manual therapy to mobilize LE, proximal hip and/or LS spine soft tissue/joints for the purpose of modulating pain, promoting relaxation, increasing ROM, reducing/eliminating soft tissue swelling/inflammation/restriction, improving soft tissue extensibility and allowing for proper ROM for normal function with self-care, mobility, lifting and ambulation. Modalities:     [x] GAME READY (VASO)- for significant edema, swelling, pain control. 15' moderate compression   Swelling: pre: midpatellar 48 cm, post: midpatellar: 46 cm    Charges:  Timed Code Treatment Minutes: 55   Total Treatment Minutes: 75      [] EVAL (LOW) 54457 (typically 20 minutes face-to-face)   [] EVAL (MOD) 50399 (typically 30 minutes face-to-face)  [] EVAL (HIGH) 42111 (typically 45 minutes face-to-face)  [] RE-EVAL     [x] OM(03896) x 2 (25') [] IONTO  [x] NMR (49355) x 1 (12') [x] VASO (15')  [] Manual (47864) x     [] Other:  [x] TA x  1 (18')   [] Mech Traction (94902)  [] ES(attended) (95615)     [] ES (un) (21521):         ASSESSMENT:  Patient demonstrated a fair carry over with HEP and exercises from initial visit in session, moderate verbal cueing required throughout and significant fatigue noted with strengthening HEP exercises. Introduction of mini squats, TA activation, and bridges tolerated well by patient.  Weakness with fatigue on mini squats noted by patient. She demonstrated some increased swelling in the L knee this date, which was improved after Game Ready vaso unit. Upon assessment, 2+ TTP along posterior tibialis muscle belly and tendon insertion under arch of L foot. Educated patient on appropriate footwear with arch support to assist in reducing pain with this. Education provided on appropriate loading of the knees and machines to use in the gym to facilitate strengthening in a way that minimizes any increased pain. Plan to address limitations in neuromuscular control, strength, and range of motion with the L knee. GOALS:     Patient stated goal: Improve strength and decrease pain in L knee with ADLs  [] Progressing: [] Met: [] Not Met: [] Adjusted    Therapist goals for Patient:   Short Term Goals: To be achieved in: 2 weeks  1. Independent in HEP and progression per patient tolerance, in order to prevent re-injury. [] Progressing: [] Met: [] Not Met: [] Adjusted  2. Patient will have a decrease in pain to facilitate improvement in movement, function, and ADLs as indicated by Functional Deficits. [] Progressing: [] Met: [] Not Met: [] Adjusted    Long Term Goals: To be achieved in: 6 weeks  1. Disability index score of 30% or less on LEFS to assist with reaching prior level of function. [] Progressing: [] Met: [] Not Met: [] Adjusted  2. Patient will demonstrate increased L knee extension AROM to 0° to allow for proper joint functioning as indicated by patients Functional Deficits. [] Progressing: [] Met: [] Not Met: [] Adjusted  3. Patient will demonstrate an increase in L quad and HS strength to 4+/5 or greater without pain to allow for proper functional mobility as indicated by patients Functional Deficits. [] Progressing: [] Met: [] Not Met: [] Adjusted  4.  Patient will return to ascending/descending 1 flight of stairs with reciprocal gait pattern with 2/10 pain or less in L knee so that she may return to PLOF for ADLs in home. [] Progressing: [] Met: [] Not Met: [] Adjusted  5. Patient will be able to stand/walk >30 minutes with 2/10 pain or less in L knee so that she may return to PLOF for ADLs. [] Progressing: [] Met: [] Not Met: [] Adjusted       Overall Progression Towards Functional goals/ Treatment Progress Update:  [] Patient is progressing as expected towards functional goals listed. [] Progression is slowed due to complexities/Impairments listed. [] Progression has been slowed due to co-morbidities. [x] Plan just implemented, too soon to assess goals progression <30days   [] Goals require adjustment due to lack of progress  [] Patient is not progressing as expected and requires additional follow up with physician  [] Other    Prognosis for POC: [x] Good [] Fair  [] Poor      Patient requires continued skilled intervention: [x] Yes  [] No    Treatment/Activity Tolerance:  [x] Patient able to complete treatment  [] Patient limited by fatigue  [] Patient limited by pain    [] Patient limited by other medical complications  [] Other:         PLAN: See eval  [x] Continue per plan of care [] Alter current plan   [] Plan of care initiated [] Hold pending MD visit [] Discharge      Electronically signed by:    Adilia Vences, Student Physical Therapist      Therapist was present, directed the patient's care, made skilled judgement, and was responsible for assessment and treatment of the patient. Henri Schwartz, PT, DPT, OCS, OMT-C    Board-Certified Orthopaedic Clinical Specialist    [place] PT license: [id number]  [place] PT license: [id number]      Note: If patient does not return for scheduled/ recommended follow up visits, this note will serve as a discharge from care along with most recent update on progress.

## 2023-03-13 ENCOUNTER — TREATMENT (OUTPATIENT)
Dept: PHYSICAL THERAPY | Age: 48
End: 2023-03-13
Payer: COMMERCIAL

## 2023-03-13 DIAGNOSIS — M25.662 DECREASED ROM OF LEFT KNEE: ICD-10-CM

## 2023-03-13 DIAGNOSIS — M25.562 LEFT KNEE PAIN, UNSPECIFIED CHRONICITY: Primary | ICD-10-CM

## 2023-03-13 PROCEDURE — 97110 THERAPEUTIC EXERCISES: CPT | Performed by: PHYSICAL THERAPIST

## 2023-03-13 PROCEDURE — 97530 THERAPEUTIC ACTIVITIES: CPT | Performed by: PHYSICAL THERAPIST

## 2023-03-13 PROCEDURE — 97140 MANUAL THERAPY 1/> REGIONS: CPT | Performed by: PHYSICAL THERAPIST

## 2023-03-13 PROCEDURE — 97112 NEUROMUSCULAR REEDUCATION: CPT | Performed by: PHYSICAL THERAPIST

## 2023-03-13 NOTE — PROGRESS NOTES
[facility]   Phone: [phone]    Fax: [phone]      Physical Therapy Treatment Note/ Progress Report:           Date:  3/13/2023    Patient Name:  Rhonda Wilson    :  1975  MRN: [id number]  Restrictions/Precautions:    Medical/Treatment Diagnosis Information:  Diagnosis: L knee pain (chronic)     Insurance/Certification information:  PT Insurance Information: BCBS  Physician Information:  Referring Practitioner: ANICETO Santizo  Has the plan of care been signed (Y/N):        [x]  Yes  []  No     Date of Patient follow up with Physician: PRN      Is this a Progress Report:     []  Yes  [x]  No        If Yes:  Date Range for reporting period:  Beginnin2023  Ending: 3/21/2023    Progress report will be due (10 Rx or 30 days whichever is less): 3/64/7089       Recertification will be due (POC Duration  / 90 days whichever is less): 3/21/2023         Visit # Insurance Allowable Auth Required   3 30  Hard max []  Yes [x]  No        Functional Scale: LEFS: 29/80, 63.75% limitation   Date assessed:  2023      Latex Allergy:  [x]NO      []YES  Preferred Language for Healthcare:   [x]English       []other:      Pain level:  3/10 currently; 9-10/10 at worst    SUBJECTIVE:  Patient reports that her L knee had increased pain over the weekend as she performed heavy cleaning activities that involved climbing up and down off of a step stool. She swept and mopped and climbed flights of stairs. She did not take any NSAIDs or ice after this. She reports hesitation with taking medication due to recent diagnosis of Odonnell's disease and she could not find her ice pack. She also had increased stress at work due to staff changes and has not had time to do her HEP since last session. Today she presents with slight soreness in the L knee.     OBJECTIVE: See mery COOPER Comment:  2023   Hamstring Fair       Gastroc Fair       ITB Quad                                 ROM PROM AROM Comment:  2/21/2023     L R L R     Flexion     120° 120°     Extension     -4° 0°                                       Strength L R Comment:  3/7/2023   Quad 4/5 5/5     Hamstring 4/5 5/5     Gastroc         Posterior Tib 4/5*     Hip abd 4+/5 4+/5 In sitting   Hip Add 4+/5 4+/5 In sitting      Girth:  3/7/2023 L R   Mid Patella 48 cm 46.5 cm   Suprapatellar 54 cm 49 cm         Special Test Results/Comment:  2/21/2023   Dyana Negative on L   Crepitus Positive B   Valgus Laxity Negative on L   Varus Laxity Negative on L   Anterior drawer Negative on L   Drop Back Negative on L        RESTRICTIONS/PRECAUTIONS: OA, Hx of L meniscus injury    Exercises/Interventions:     Therapeutic Ex (74311) Sets/sec Reps Notes/CUES   BIKE      TREADMILL            STRETCHING      Towel Pull Calf 20\" x5    Inclined Calf 20\" x3    Hamstrings 20\" x5    Quads-prone 20\" x5    Hip Flexors      ITB                  ROM      Passive      Active      Weight Hangs      Sheet Pulls      Ankle Pumps      ERMI            STRENGTHENING      Quad Sets 10\" x10 Toe out   Ham Sets      Hip ADD Sets BS 10\" x10    SLR Flexion 2 x10 Toe out   SLR Abduction 3 x10    SLR Prone      SLR Adduction      SLR+      Clam shells 2 x10 Red band   Bridges 2 x10    Hamstring curls-prone 2 x10 Manual resistance for concentric contraction (moderate by PT)         PRE Machines      Knee Extension      Knee Flexion      Leg Press            CKC      Calf Raises      Mini Squats 2 x10    Wall Sits      Step ups Lvl 2, 2 x10 Cueing for controlled movement, SLS at top of step and eccentric lowering with step down.    TKE                  Manual Intervention (95823)      Patellar Mobs      Femoral Tibial mobs      STM 8'  Proximal posterior tib   Scar Massage      Foam Roll            NMR re-education (29069)   CUES NEEDED   Biodex Balance      Tandem Balance      SLB      Rebounder      BOSU      TA activation 5\" x10 Therapeutic Activity (28746)      Core Training      [employer] Activities      Monster Walks      TRX training      Education 5'  Educated patient on HEP update                   Therapeutic Exercise and NMR EXR  [x] (28934) Provided verbal/tactile cueing for activities related to strengthening, flexibility, endurance, ROM for improvements in LE, proximal hip, and core control with self care, mobility, lifting, ambulation. [x] (01632) Provided verbal/tactile cueing for activities related to improving balance, coordination, kinesthetic sense, posture, motor skill, proprioception to assist with LE, proximal hip, and core control in self-care, mobility, lifting, ambulation and eccentric single leg control. NMR and Therapeutic Activities:    [x] (91787 or 46162) Provided verbal/tactile cueing for activities related to improving balance, coordination, kinesthetic sense, posture, motor skill, proprioception and motor activation to allow for proper function of core, proximal hip and LE with self-care and ADLs and functional mobility.   [] (29106) Gait Re-education- Provided training and instruction to the patient for proper LE, core and proximal hip recruitment and positioning and eccentric body weight control with ambulation re-education including up and down stairs     Home Exercise Program:    [x] (29517) Reviewed/Progressed HEP activities related to strengthening, flexibility, endurance, ROM of core, proximal hip and LE for functional self-care, mobility, lifting and ambulation/stair navigation     Access Code: [email or web address]  URL: [email or web address]  Date: 03/07/2023  Prepared by: Sakshi Abreu    Exercises  Seated Table Hamstring Stretch - 1-2 x daily - 5-7 x weekly - 1 sets - 5 reps - 20 seconds hold  Long Sitting Calf Stretch with Strap - 1-2 x daily - 5-7 x weekly - 1 sets - 5 reps - 20 seconds hold  Long Sitting Quad Set - 1-2 x daily - 5-7 x weekly - 1 sets - 10 reps - 10 seconds hold  Supine Hip Adduction Isometric with Ball - 1-2 x daily - 5-7 x weekly - 1 sets - 10 reps - 10 seconds hold  Supine Active Straight Leg Raise (Mirrored) - 1 x daily - 5 x weekly - 2 sets - 10 reps  Clamshell - 1-2 x daily - 5-7 x weekly - 2 sets - 10 reps  Supine Bridge - 1 x daily - 5 x weekly - 2 sets - 10 reps  Supine Transversus Abdominis Bracing - Hands on Stomach - 1-2 x daily - 5-7 x weekly - 1 sets - 10 reps - 10 seconds hold      [] (68721) Reviewed/Progressed HEP activities related to improving balance, coordination, kinesthetic sense, posture, motor skill, proprioception of core, proximal hip and LE for self-care, mobility, lifting, and ambulation/stair navigation      Manual Treatments:  PROM / STM / Oscillations-Mobs:  G-I, II, III, IV (PA's, Inf., Post.)  [x] (05050) Provided manual therapy to mobilize LE, proximal hip and/or LS spine soft tissue/joints for the purpose of modulating pain, promoting relaxation, increasing ROM, reducing/eliminating soft tissue swelling/inflammation/restriction, improving soft tissue extensibility and allowing for proper ROM for normal function with self-care, mobility, lifting and ambulation. Modalities:  Patient denied   [] GAME READY (VASO)- for significant edema, swelling, pain control. Swelling:  Charges:  Timed Code Treatment Minutes: 68   Total Treatment Minutes: 68      [] EVAL (LOW) 27331 (typically 20 minutes face-to-face)   [] EVAL (MOD) 76467 (typically 30 minutes face-to-face)  [] EVAL (HIGH) 75359 (typically 45 minutes face-to-face)  [] RE-EVAL     [x] EQ(55050) x 2 (28') [] IONTO  [x] NMR (54065) x 1 (12') [] VASO   [x] Manual (23159) x 1 (8')    [] Other:  [x] TA x  1 (20')   [] [facility] T[facility] (35652)  [] ES(attended) (76468)     [] ES (un) (05416):         ASSESSMENT:  Patient demonstrated ability to progress in strengthening exercises today.  Introduction of step ups, hip abduction and hip extension, and manual resisted hamstring curls were tolerated well by patient. Significant fatigue noted with supine SLR, hip abduction, and step ups today. During step ups, patient was instructed to perform with SLB and slow and controlled eccentric lowering. Notable deficiency in balance with this activity on the L LE compared to the R LE. Will continue to address hip and ankle strategy and neuromsucular control during sessions. Upon assessment, 2+ TTP on lateral aspect of patellar tendon, MCL, and  posterior tibialis. Patient did note that a \"hot/cold sensation\" was felt throughout the L LE that increased with STM of the proximal aspect of the posterior tib. Softening of palpable muscle tightness noted with STM as well. Discussed and updated HEP verbally with patient today. Plan to address limitations in neuromuscular control, strength, and range of motion with the L knee. GOALS:     Patient stated goal: Improve strength and decrease pain in L knee with ADLs  [] Progressing: [] Met: [] Not Met: [] Adjusted    Therapist goals for Patient:   Short Term Goals: To be achieved in: 2 weeks  1. Independent in HEP and progression per patient tolerance, in order to prevent re-injury. [] Progressing: [] Met: [] Not Met: [] Adjusted  2. Patient will have a decrease in pain to facilitate improvement in movement, function, and ADLs as indicated by Functional Deficits. [] Progressing: [] Met: [] Not Met: [] Adjusted    Long Term Goals: To be achieved in: 6 weeks  1. Disability index score of 30% or less on LEFS to assist with reaching prior level of function. [] Progressing: [] Met: [] Not Met: [] Adjusted  2. Patient will demonstrate increased L knee extension AROM to 0° to allow for proper joint functioning as indicated by patients Functional Deficits. [] Progressing: [] Met: [] Not Met: [] Adjusted  3.  Patient will demonstrate an increase in L quad and HS strength to 4+/5 or greater without pain to allow for proper functional mobility as indicated by patients Functional Deficits. [] Progressing: [] Met: [] Not Met: [] Adjusted  4. Patient will return to ascending/descending 1 flight of stairs with reciprocal gait pattern with 2/10 pain or less in L knee so that she may return to PLOF for ADLs in home. [] Progressing: [] Met: [] Not Met: [] Adjusted  5. Patient will be able to stand/walk >30 minutes with 2/10 pain or less in L knee so that she may return to PLOF for ADLs. [] Progressing: [] Met: [] Not Met: [] Adjusted       Overall Progression Towards Functional goals/ Treatment Progress Update:  [] Patient is progressing as expected towards functional goals listed. [] Progression is slowed due to complexities/Impairments listed. [] Progression has been slowed due to co-morbidities. [x] Plan just implemented, too soon to assess goals progression <30days   [] Goals require adjustment due to lack of progress  [] Patient is not progressing as expected and requires additional follow up with physician  [] Other    Prognosis for POC: [x] Good [] Fair  [] Poor      Patient requires continued skilled intervention: [x] Yes  [] No    Treatment/Activity Tolerance:  [x] Patient able to complete treatment  [] Patient limited by fatigue  [] Patient limited by pain    [] Patient limited by other medical complications  [] Other:         PLAN: See eval  [x] Continue per plan of care [] Alter current plan   [] Plan of care initiated [] Hold pending MD visit [] Discharge      Electronically signed by:    Shaggy Albrecht, Student Physical Therapist      Therapist was present, directed the patient's care, made skilled judgement, and was responsible for assessment and treatment of the patient.      Ariel Verduzco, PT, DPT, OCS, OMT-C    Board-Certified Orthopaedic Clinical Specialist    [address] [address] PT license: [id number]  [place] PT license: [id number]      Note: If patient does not return for scheduled/ recommended follow up visits, this note will serve as a discharge from care along with most recent update on progress.

## 2023-03-16 ENCOUNTER — TREATMENT (OUTPATIENT)
Dept: PHYSICAL THERAPY | Age: 48
End: 2023-03-16

## 2023-03-16 DIAGNOSIS — M25.662 DECREASED ROM OF LEFT KNEE: ICD-10-CM

## 2023-03-16 DIAGNOSIS — M25.562 LEFT KNEE PAIN, UNSPECIFIED CHRONICITY: Primary | ICD-10-CM

## 2023-03-16 NOTE — PROGRESS NOTES
[facility]   Phone: [phone]    Fax: [phone]      Physical Therapy Treatment Note/ Progress Report:           Date:  3/16/2023    Patient Name:  Richie Ojeda    :  1975  MRN: [id number]  Restrictions/Precautions:    Medical/Treatment Diagnosis Information:  Diagnosis: L knee pain (chronic)     Insurance/Certification information:  PT Insurance Information: BCBS  Physician Information:  Referring Practitioner: ANICETO Rowley  Has the plan of care been signed (Y/N):        [x]  Yes  []  No     Date of Patient follow up with Physician: PRN      Is this a Progress Report:     []  Yes  [x]  No        If Yes:  Date Range for reporting period:  Beginnin2023  Ending: 3/21/2023    Progress report will be due (10 Rx or 30 days whichever is less):        Recertification will be due (POC Duration  / 90 days whichever is less): 3/21/2023         Visit # Insurance Allowable Auth Required   4 30  Hard max []  Yes [x]  No        Functional Scale: LEFS: 2980, 63.75% limitation   Date assessed:  2023      Latex Allergy:  [x]NO      []YES  Preferred Language for Healthcare:   [x]English       []other:      Pain level:  3/10 currently; 9-10/10 at worst    SUBJECTIVE:    She states that since she was here last visit, her LE has felt better. She thought that the exercises, mainly the stretches have helped. She still has occasional hot/cold sensations in her foot and front of her shin. She admits she hasn't been good about using ice on her knee at home.          OBJECTIVE: See eval    Flexibility L R Comment:  2023   Hamstring Fair       Gastroc Fair       ITB         Quad                                 ROM PROM AROM Comment:  2023     L R L R     Flexion     120° 120°     Extension     -4° 0°                                       Strength L R Comment:  3/7/2023   Quad 4/5 5/5     Hamstring 4/5 5/5     Gastroc Posterior Tib 4/5*     Hip abd 4+/5 4+/5 In sitting   Hip Add 4+/5 4+/5 In sitting      Girth:  3/7/2023 L R   Mid Patella 48 cm 46.5 cm   Suprapatellar 54 cm 49 cm         Special Test Results/Comment:  2/21/2023   Dyana Negative on L   Crepitus Positive B   Valgus Laxity Negative on L   Varus Laxity Negative on L   Anterior drawer Negative on L   Drop Back Negative on L        RESTRICTIONS/PRECAUTIONS: OA, Hx of L meniscus injury    Exercises/Interventions:     Therapeutic Ex (49814) Sets/sec Reps Notes/CUES   BIKE      TREADMILL            STRETCHING      Towel Pull Calf 20\" x5    Inclined Calf 20\" x3    Hamstrings 20\" x5    Quads-prone 20\" x5    Hip Flexors      ITB                  ROM      Passive      Active      Weight Hangs      Sheet Pulls      Ankle Pumps      ERMI            STRENGTHENING      Quad Sets 10\" x10 Toe out   Ham Sets      Hip ADD Sets BS 10\" x10    SLR Flexion 2 x10 Toe out   SLR Abduction 3 x10    SLR Prone      SLR Adduction      SLR+      Clam shells 2 x10 Red band   Bridges 2 x10          PRE Machines      Knee Extension      Knee Flexion      Leg Press            CKC      Calf Raises      Mini Squats 2 x10    Wall Sits      Step ups Lvl 2, 2 x10 Cueing for controlled movement, SLS at top of step and eccentric lowering with step down.    TKE                  Manual Intervention (88428)      Patellar Mobs      Femoral Tibial mobs      STM 8'  Proximal posterior tib   Scar Massage      Foam Roll            NMR re-education (98485)   CUES NEEDED   Biodex Balance      Tandem Balance      SLB      Rebounder      BOSU      TA activation 5\" x10                      Therapeutic Activity (84067)      Core Training      [employer] Activities      Monster Walks      TRX training      Education 5'  Educated patient on HEP update                   Therapeutic Exercise and NMR EXR  [x] (18009) Provided verbal/tactile cueing for activities related to strengthening, flexibility, endurance, ROM for improvements in LE, proximal hip, and core control with self care, mobility, lifting, ambulation. [x] (10499) Provided verbal/tactile cueing for activities related to improving balance, coordination, kinesthetic sense, posture, motor skill, proprioception to assist with LE, proximal hip, and core control in self-care, mobility, lifting, ambulation and eccentric single leg control. NMR and Therapeutic Activities:    [x] (70716 or 53907) Provided verbal/tactile cueing for activities related to improving balance, coordination, kinesthetic sense, posture, motor skill, proprioception and motor activation to allow for proper function of core, proximal hip and LE with self-care and ADLs and functional mobility.   [] (74328) Gait Re-education- Provided training and instruction to the patient for proper LE, core and proximal hip recruitment and positioning and eccentric body weight control with ambulation re-education including up and down stairs     Home Exercise Program:    [x] (45440) Reviewed/Progressed HEP activities related to strengthening, flexibility, endurance, ROM of core, proximal hip and LE for functional self-care, mobility, lifting and ambulation/stair navigation     Access Code: [email or web address]  URL: Private Practice/  Date: 03/07/2023  Prepared by: Chantal Heck    Exercises  Seated Table Hamstring Stretch - 1-2 x daily - 5-7 x weekly - 1 sets - 5 reps - 20 seconds hold  Long Sitting Calf Stretch with Strap - 1-2 x daily - 5-7 x weekly - 1 sets - 5 reps - 20 seconds hold  Long Sitting Quad Set - 1-2 x daily - 5-7 x weekly - 1 sets - 10 reps - 10 seconds hold  Supine Hip Adduction Isometric with Ball - 1-2 x daily - 5-7 x weekly - 1 sets - 10 reps - 10 seconds hold  Supine Active Straight Leg Raise (Mirrored) - 1 x daily - 5 x weekly - 2 sets - 10 reps  Clamshell - 1-2 x daily - 5-7 x weekly - 2 sets - 10 reps  Supine Bridge - 1 x daily - 5 x weekly - 2 sets - 10 reps  Supine Transversus Abdominis Bracing - Hands on Stomach - 1-2 x daily - 5-7 x weekly - 1 sets - 10 reps - 10 seconds hold      [] (22292) Reviewed/Progressed HEP activities related to improving balance, coordination, kinesthetic sense, posture, motor skill, proprioception of core, proximal hip and LE for self-care, mobility, lifting, and ambulation/stair navigation      Manual Treatments:  PROM / STM / Oscillations-Mobs:  G-I, II, III, IV (PA's, Inf., Post.)  [x] (28346) Provided manual therapy to mobilize LE, proximal hip and/or LS spine soft tissue/joints for the purpose of modulating pain, promoting relaxation, increasing ROM, reducing/eliminating soft tissue swelling/inflammation/restriction, improving soft tissue extensibility and allowing for proper ROM for normal function with self-care, mobility, lifting and ambulation. Modalities:      [x] GAME READY (VASO)- for significant edema, swelling, pain control. 15' moderate compression   Swelling:  Charges:  Timed Code Treatment Minutes: 55   Total Treatment Minutes: 70      [] EVAL (LOW) 19379 (typically 20 minutes face-to-face)   [] EVAL (MOD) 82322 (typically 30 minutes face-to-face)  [] EVAL (HIGH) 35768 (typically 45 minutes face-to-face)  [] RE-EVAL     [x] ZH(06362) x 1 (20') [] IONTO  [x] NMR (95027) x 1 (12') [x] VASO x15'  [x] Manual (98829) x 1 (8')    [] Other:  [x] TA x  1 (15')   [] [facility] (06614)  [] ES(attended) (80271)     [] ES (un) (03821):         ASSESSMENT:    Less swelling noted in her knee today. She remains tender along the medial joint line and medial tibia (posterior tib area). She does require some cueing with step ups/overs. Lacks good eccentric control, tends to \"fall\" down the last 1-2 inches due to lack of control. She has good ROM in general, some mild tightness noted.             GOALS:     Patient stated goal: Improve strength and decrease pain in L knee with ADLs  [] Progressing: [] Met: [] Not Met: [] Adjusted    Therapist goals for Patient:   Short Term Goals: To be achieved in: 2 weeks  1. Independent in HEP and progression per patient tolerance, in order to prevent re-injury. [] Progressing: [] Met: [] Not Met: [] Adjusted  2. Patient will have a decrease in pain to facilitate improvement in movement, function, and ADLs as indicated by Functional Deficits. [] Progressing: [] Met: [] Not Met: [] Adjusted    Long Term Goals: To be achieved in: 6 weeks  1. Disability index score of 30% or less on LEFS to assist with reaching prior level of function. [] Progressing: [] Met: [] Not Met: [] Adjusted  2. Patient will demonstrate increased L knee extension AROM to 0° to allow for proper joint functioning as indicated by patients Functional Deficits. [] Progressing: [] Met: [] Not Met: [] Adjusted  3. Patient will demonstrate an increase in L quad and HS strength to 4+/5 or greater without pain to allow for proper functional mobility as indicated by patients Functional Deficits. [] Progressing: [] Met: [] Not Met: [] Adjusted  4. Patient will return to ascending/descending 1 flight of stairs with reciprocal gait pattern with 2/10 pain or less in L knee so that she may return to PLOF for ADLs in home. [] Progressing: [] Met: [] Not Met: [] Adjusted  5. Patient will be able to stand/walk >30 minutes with 2/10 pain or less in L knee so that she may return to PLOF for ADLs. [] Progressing: [] Met: [] Not Met: [] Adjusted       Overall Progression Towards Functional goals/ Treatment Progress Update:  [] Patient is progressing as expected towards functional goals listed. [] Progression is slowed due to complexities/Impairments listed. [] Progression has been slowed due to co-morbidities.   [x] Plan just implemented, too soon to assess goals progression <30days   [] Goals require adjustment due to lack of progress  [] Patient is not progressing as expected and requires additional follow up with physician  [] Other    Prognosis for POC: [x] Good [] Fair  [] Poor      Patient requires continued skilled intervention: [x] Yes  [] No    Treatment/Activity Tolerance:  [x] Patient able to complete treatment  [] Patient limited by fatigue  [] Patient limited by pain    [] Patient limited by other medical complications  [] Other:         PLAN: See eval  [x] Continue per plan of care [] Alter current plan   [] Plan of care initiated [] Hold pending MD visit [] Discharge      Electronically signed by:        Yajaira Tejada PT      Board Certified Orthopaedic Clinical Specialist  [facility] Certified  Physical Therapist    [place] PT #[id number]  [place] PT #[id number]          Note: If patient does not return for scheduled/ recommended follow up visits, this note will serve as a discharge from care along with most recent update on progress.

## 2023-03-17 ENCOUNTER — TELEPHONE (OUTPATIENT)
Dept: PULMONOLOGY | Age: 48
End: 2023-03-17

## 2023-03-17 DIAGNOSIS — R91.1 PULMONARY NODULE: Primary | ICD-10-CM

## 2023-03-17 NOTE — TELEPHONE ENCOUNTER
Patient r/s to 23 for CT follow up. CT , new CT needs ordered. Please sign pending CT order.    Thank you

## 2023-03-23 ENCOUNTER — TREATMENT (OUTPATIENT)
Dept: PHYSICAL THERAPY | Age: 48
End: 2023-03-23
Payer: COMMERCIAL

## 2023-03-23 DIAGNOSIS — M25.662 DECREASED ROM OF LEFT KNEE: ICD-10-CM

## 2023-03-23 DIAGNOSIS — M25.562 LEFT KNEE PAIN, UNSPECIFIED CHRONICITY: Primary | ICD-10-CM

## 2023-03-23 PROCEDURE — 97112 NEUROMUSCULAR REEDUCATION: CPT | Performed by: PHYSICAL THERAPIST

## 2023-03-23 PROCEDURE — 97530 THERAPEUTIC ACTIVITIES: CPT | Performed by: PHYSICAL THERAPIST

## 2023-03-23 PROCEDURE — 97016 VASOPNEUMATIC DEVICE THERAPY: CPT | Performed by: PHYSICAL THERAPIST

## 2023-03-23 PROCEDURE — 97110 THERAPEUTIC EXERCISES: CPT | Performed by: PHYSICAL THERAPIST

## 2023-03-23 NOTE — PROGRESS NOTES
x daily - 5-7 x weekly - 1 sets - 10 reps - 10 seconds hold      [] (57607) Reviewed/Progressed HEP activities related to improving balance, coordination, kinesthetic sense, posture, motor skill, proprioception of core, proximal hip and LE for self-care, mobility, lifting, and ambulation/stair navigation      Manual Treatments:  PROM / STM / Oscillations-Mobs:  G-I, II, III, IV (PA's, Inf., Post.)  [x] (56879) Provided manual therapy to mobilize LE, proximal hip and/or LS spine soft tissue/joints for the purpose of modulating pain, promoting relaxation, increasing ROM, reducing/eliminating soft tissue swelling/inflammation/restriction, improving soft tissue extensibility and allowing for proper ROM for normal function with self-care, mobility, lifting and ambulation. Modalities:      [x] GAME READY (VASO)- for significant edema, swelling, pain control. 15' moderate compression   Swelling:  Charges:  Timed Code Treatment Minutes: 47   Total Treatment Minutes: 70      [] EVAL (LOW) 91973 (typically 20 minutes face-to-face)   [] EVAL (MOD) 88713 (typically 30 minutes face-to-face)  [] EVAL (HIGH) 28731 (typically 45 minutes face-to-face)  [] RE-EVAL     [x] KZ(44744) x 1 (20') [] IONTO  [x] NMR (70287) x 1 (12') [x] VASO x15'  [] Manual (20996) x    [] Other:  [x] TA x  1 (15')   [] [employer]ch Traction (01879)  [] ES(attended) (41138)     [] ES (un) (02642):         ASSESSMENT:    Patient was 15 minutes late for her appointment today, so we were unable to do her manual treatment. She works through all her exercises with decent control. She complained of a little more discomfort in her left hip today with some of her exercises. Much better control with eccentric step downs from the 6 inch step. She did get an appointment with Dr Sudheer Diaz next week.            GOALS:     Patient stated goal: Improve strength and decrease pain in L knee with ADLs  [] Progressing: [] Met: [] Not Met: [] Adjusted    Therapist goals

## 2023-03-27 ENCOUNTER — TREATMENT (OUTPATIENT)
Dept: PHYSICAL THERAPY | Age: 48
End: 2023-03-27

## 2023-03-27 ENCOUNTER — OFFICE VISIT (OUTPATIENT)
Dept: ORTHOPEDIC SURGERY | Age: 48
End: 2023-03-27
Payer: COMMERCIAL

## 2023-03-27 DIAGNOSIS — M25.562 LEFT KNEE PAIN, UNSPECIFIED CHRONICITY: Primary | ICD-10-CM

## 2023-03-27 DIAGNOSIS — M17.12 PATELLOFEMORAL ARTHRITIS OF LEFT KNEE: ICD-10-CM

## 2023-03-27 PROCEDURE — 99214 OFFICE O/P EST MOD 30 MIN: CPT | Performed by: ORTHOPAEDIC SURGERY

## 2023-03-27 NOTE — PROGRESS NOTES
Michael Saez [facility]   Phone: [phone]    Fax: [phone]            Physical Therapy  Cancellation/No-show Note  Patient Name:  Claritza Gardner  :  1975   Date:  3/27/2023  Cancelled visits to date: 1  No-shows to date: 1    For today's appointment patient:  [x]  Cancelled  []  Rescheduled appointment  []  No-show     Reason given by patient:  []  Patient ill  []  Conflicting appointment  []  No transportation    []  Conflict with work  []  No reason given  [x]  Other:     Comments:  Patient was at a doctor appointment that got out later than expected and she is no longer be able to make it to PT in time for her appointment. Phone call information:   []  Phone call made today to patient at _ time at number provided:      []  Patient answered, conversation as follows:    []  Patient did not answer, message left as follows:  []  Phone call not made today  [x]  Phone call not needed - pt contacted us to cancel and provided reason for cancellation. Electronically signed by:    Harika Reid, Student Physical Therapist      Therapist was present, directed the patient's care, made skilled judgement, and was responsible for assessment and treatment of the patient.      Omid Erm, PT, DPT, OCS, OMT-C    Board-Certified Orthopaedic Clinical Specialist    [id number] [address] PT license: [id number]  [place] PT license: [id number]

## 2023-03-27 NOTE — PROGRESS NOTES
Maria Luisa Brambila attest that I met personally with the patient, performed the described exam, reviewed the radiographic studies and medical records associated with this patient and supervised the services that are described above.      Marek Corley MD

## 2023-03-30 ENCOUNTER — TREATMENT (OUTPATIENT)
Dept: PHYSICAL THERAPY | Age: 48
End: 2023-03-30
Payer: COMMERCIAL

## 2023-03-30 DIAGNOSIS — M25.562 LEFT KNEE PAIN, UNSPECIFIED CHRONICITY: Primary | ICD-10-CM

## 2023-03-30 DIAGNOSIS — M25.662 DECREASED ROM OF LEFT KNEE: ICD-10-CM

## 2023-03-30 PROCEDURE — 97110 THERAPEUTIC EXERCISES: CPT | Performed by: PHYSICAL THERAPIST

## 2023-03-30 PROCEDURE — 97112 NEUROMUSCULAR REEDUCATION: CPT | Performed by: PHYSICAL THERAPIST

## 2023-03-30 PROCEDURE — 97530 THERAPEUTIC ACTIVITIES: CPT | Performed by: PHYSICAL THERAPIST

## 2023-03-30 PROCEDURE — 97016 VASOPNEUMATIC DEVICE THERAPY: CPT | Performed by: PHYSICAL THERAPIST

## 2023-03-30 NOTE — PROGRESS NOTES
Core Training      [place] [employer] Activities      [facility] Walks      TRX training      Education 5'  Educated patient on HEP update                   Therapeutic Exercise and NMR EXR  [x] (69987) Provided verbal/tactile cueing for activities related to strengthening, flexibility, endurance, ROM for improvements in LE, proximal hip, and core control with self care, mobility, lifting, ambulation. [x] (75285) Provided verbal/tactile cueing for activities related to improving balance, coordination, kinesthetic sense, posture, motor skill, proprioception to assist with LE, proximal hip, and core control in self-care, mobility, lifting, ambulation and eccentric single leg control. NMR and Therapeutic Activities:    [x] (68340 or 14828) Provided verbal/tactile cueing for activities related to improving balance, coordination, kinesthetic sense, posture, motor skill, proprioception and motor activation to allow for proper function of core, proximal hip and LE with self-care and ADLs and functional mobility.   [] (16046) Gait Re-education- Provided training and instruction to the patient for proper LE, core and proximal hip recruitment and positioning and eccentric body weight control with ambulation re-education including up and down stairs     Home Exercise Program:    [x] (76840) Reviewed/Progressed HEP activities related to strengthening, flexibility, endurance, ROM of core, proximal hip and LE for functional self-care, mobility, lifting and ambulation/stair navigation     Access Code: [email or web address]  URL: [email or web address]  Date: 03/07/2023  Prepared by: Renée Mcclendon    Exercises  Seated Table Hamstring Stretch - 1-2 x daily - 5-7 x weekly - 1 sets - 5 reps - 20 seconds hold  Long Sitting Calf Stretch with Strap - 1-2 x daily - 5-7 x weekly - 1 sets - 5 reps - 20 seconds hold  Long Sitting Quad Set - 1-2 x daily - 5-7 x weekly - 1 sets - 10 reps - 10 seconds hold  Supine Hip Adduction Isometric with [facility] -

## 2023-04-03 ENCOUNTER — TREATMENT (OUTPATIENT)
Dept: PHYSICAL THERAPY | Age: 48
End: 2023-04-03
Payer: COMMERCIAL

## 2023-04-03 DIAGNOSIS — M25.662 DECREASED ROM OF LEFT KNEE: ICD-10-CM

## 2023-04-03 DIAGNOSIS — M25.562 LEFT KNEE PAIN, UNSPECIFIED CHRONICITY: Primary | ICD-10-CM

## 2023-04-03 PROCEDURE — 97530 THERAPEUTIC ACTIVITIES: CPT | Performed by: PHYSICAL THERAPIST

## 2023-04-03 PROCEDURE — 97110 THERAPEUTIC EXERCISES: CPT | Performed by: PHYSICAL THERAPIST

## 2023-04-03 PROCEDURE — 97112 NEUROMUSCULAR REEDUCATION: CPT | Performed by: PHYSICAL THERAPIST

## 2023-04-03 NOTE — PROGRESS NOTES
Sheet Pulls      Ankle Pumps      ERMI            STRENGTHENING      Quad Sets 10\" x10 Over towel roll   Ham Sets      Hip ADD Sets BS 10\" x10    SLR Flexion   1#, 2 x10 Toe out   SLR Abduction   1#, 2 x10    SLR Prone      SLR Adduction      SLR+      Clam shells 2 x10 Red band   Bridges 2 x10 With red band for hip abduction resistance         PRE Machines      Knee Extension      Knee Flexion      Leg Press            CKC      Calf Raises      Mini Squats 2 x10    Wall Sits      Step ups Lvl 2, 2 x10 Cueing for controlled movement, SLS at top of step and eccentric lowering with step down. TKE                  Manual Intervention (32887)      Patellar Mobs      Femoral Tibial mobs      Scar Massage      Foam Roll            NMR re-education (06546)   CUES NEEDED   Biodex Balance      Rocker Board X1' each  Sd/sd tips and holds   SLB      Rebounder      BOSU                           Therapeutic Activity (67414)      Core Training      [facility]      TRX training      Education 5'  Educated patient on HEP update                   Therapeutic Exercise and NMR EXR  [x] (42151) Provided verbal/tactile cueing for activities related to strengthening, flexibility, endurance, ROM for improvements in LE, proximal hip, and core control with self care, mobility, lifting, ambulation. [x] (87906) Provided verbal/tactile cueing for activities related to improving balance, coordination, kinesthetic sense, posture, motor skill, proprioception to assist with LE, proximal hip, and core control in self-care, mobility, lifting, ambulation and eccentric single leg control. NMR and Therapeutic Activities:    [x] (20659 or 49266) Provided verbal/tactile cueing for activities related to improving balance, coordination, kinesthetic sense, posture, motor skill, proprioception and motor activation to allow for proper function of core, proximal hip and LE with self-care and ADLs and functional mobility.

## 2023-04-05 ENCOUNTER — TREATMENT (OUTPATIENT)
Dept: PHYSICAL THERAPY | Age: 48
End: 2023-04-05

## 2023-04-05 DIAGNOSIS — M25.662 DECREASED ROM OF LEFT KNEE: ICD-10-CM

## 2023-04-05 DIAGNOSIS — M25.562 LEFT KNEE PAIN, UNSPECIFIED CHRONICITY: Primary | ICD-10-CM

## 2023-04-05 NOTE — PROGRESS NOTES
daily - 5-7 x weekly - 1 sets - 5 reps - 20 seconds hold  Long Sitting Quad Set - 1-2 x daily - 5-7 x weekly - 1 sets - 10 reps - 10 seconds hold  Supine Hip Adduction Isometric with Ball - 1-2 x daily - 5-7 x weekly - 1 sets - 10 reps - 10 seconds hold  Supine Active Straight Leg Raise (Mirrored) - 1 x daily - 5 x weekly - 2 sets - 10 reps  Clamshell - 1-2 x daily - 5-7 x weekly - 2 sets - 10 reps  Supine Bridge - 1 x daily - 5 x weekly - 2 sets - 10 reps  Supine Transversus Abdominis Bracing - Hands on Stomach - 1-2 x daily - 5-7 x weekly - 1 sets - 10 reps - 10 seconds hold      [] (02611) Reviewed/Progressed HEP activities related to improving balance, coordination, kinesthetic sense, posture, motor skill, proprioception of core, proximal hip and LE for self-care, mobility, lifting, and ambulation/stair navigation      Manual Treatments:  PROM / STM / Oscillations-Mobs:  G-I, II, III, IV (PA's, Inf., Post.)  [x] (79772) Provided manual therapy to mobilize LE, proximal hip and/or LS spine soft tissue/joints for the purpose of modulating pain, promoting relaxation, increasing ROM, reducing/eliminating soft tissue swelling/inflammation/restriction, improving soft tissue extensibility and allowing for proper ROM for normal function with self-care, mobility, lifting and ambulation. Modalities:   Patient declined   [x] GAME READY (VASO)- for significant edema, swelling, pain control.  10' moderate compression   Swelling: pre: midpatellar 47 cm, post: midpatellar: 46 cm    Charges:  Timed Code Treatment Minutes: 53   Total Treatment Minutes: 63      [] EVAL (LOW) 93945 (typically 20 minutes face-to-face)   [] EVAL (MOD) 87340 (typically 30 minutes face-to-face)  [] EVAL (HIGH) 13430 (typically 45 minutes face-to-face)  [] RE-EVAL     [x] EY(82300) x 2 (27') [] IONTO  [x] NMR (36598) x 1 (12') [x] VASO (10')  [] Manual (76318) x    [] Other:  [x] TA x  1 (14')   [] [facility] (44603)  [] ES(attended) (95971)

## 2023-04-17 ENCOUNTER — TREATMENT (OUTPATIENT)
Dept: PHYSICAL THERAPY | Age: 48
End: 2023-04-17

## 2023-04-17 DIAGNOSIS — M25.662 DECREASED ROM OF LEFT KNEE: ICD-10-CM

## 2023-04-17 DIAGNOSIS — M25.562 LEFT KNEE PAIN, UNSPECIFIED CHRONICITY: Primary | ICD-10-CM

## 2023-04-17 NOTE — PROGRESS NOTES
Michael Saez [facility]   Phone: [phone]    Fax: [phone]            Physical Therapy  Cancellation/No-show Note  Patient Name:  Allyson Cruz  :  1975   Date:  2023  Cancelled visits to date: 2  No-shows to date: 1    For today's appointment patient:  [x]  Cancelled  []  Rescheduled appointment  []  No-show     Reason given by patient:  [x]  Patient ill  []  Conflicting appointment  []  No transportation    []  Conflict with work  []  No reason given  []  Other:     Comments:      Phone call information:   []  Phone call made today to patient at _ time at number provided:      []  Patient answered, conversation as follows:    []  Patient did not answer, message left as follows:  []  Phone call not made today  [x]  Phone call not needed - pt contacted us to cancel and provided reason for cancellation. Electronically signed by:    Sanjuanita Mills, Student Physical Therapist      Therapist was present, directed the patient's care, made skilled judgement, and was responsible for assessment and treatment of the patient.      Chencho Kan, PT, DPT, OCS, OMT-C    Board-Certified Orthopaedic Clinical Specialist    [id number] [address] PT license: [id number]  [place] PT license: [id number] Patient presents ambulatory via triage with complaints of right lower abd pain onset 6/15.  Pt was seen in this ED on 6/17 for same s/s, reports \"they found a cyst on my testicle\".  States pain returns tonight.  Pt reports diarrhea onset 6/17.   Endorses nausea,RLQ abd pain   Denies emesis, urinary changes, hematuria, dysuria, SOB, CP

## 2023-04-19 ENCOUNTER — TREATMENT (OUTPATIENT)
Dept: PHYSICAL THERAPY | Age: 48
End: 2023-04-19

## 2023-04-19 DIAGNOSIS — M25.562 LEFT KNEE PAIN, UNSPECIFIED CHRONICITY: Primary | ICD-10-CM

## 2023-04-19 DIAGNOSIS — M25.662 DECREASED ROM OF LEFT KNEE: ICD-10-CM

## 2023-04-19 NOTE — PROGRESS NOTES
Michael Saez [facility]   Phone: [phone]    Fax: [phone]            Physical Therapy  Cancellation/No-show Note  Patient Name:  Horace Moyer  :  1975   Date:  2023  Cancelled visits to date: 3  No-shows to date: 1    For today's appointment patient:  [x]  Cancelled  []  Rescheduled appointment  []  No-show     Reason given by patient:  []  Patient ill  []  Conflicting appointment  []  No transportation    []  Conflict with work  []  No reason given  [x]  Other:  Stuck in traffic   Comments:      Phone call information:   []  Phone call made today to patient at _ time at number provided:      []  Patient answered, conversation as follows:    []  Patient did not answer, message left as follows:  []  Phone call not made today  [x]  Phone call not needed - pt contacted us to cancel and provided reason for cancellation.      Electronically signed by:        Kiko Stewart, PT, DPT, OCS, OMT-C    Board-Certified Orthopaedic Clinical Specialist    [id number] [address] PT license: [id number]  [place] PT license: [id number]

## 2023-04-24 ENCOUNTER — TREATMENT (OUTPATIENT)
Dept: PHYSICAL THERAPY | Age: 48
End: 2023-04-24
Payer: COMMERCIAL

## 2023-04-24 DIAGNOSIS — M25.562 LEFT KNEE PAIN, UNSPECIFIED CHRONICITY: Primary | ICD-10-CM

## 2023-04-24 DIAGNOSIS — M25.662 DECREASED ROM OF LEFT KNEE: ICD-10-CM

## 2023-04-24 PROCEDURE — 97112 NEUROMUSCULAR REEDUCATION: CPT | Performed by: PHYSICAL THERAPIST

## 2023-04-24 PROCEDURE — 97530 THERAPEUTIC ACTIVITIES: CPT | Performed by: PHYSICAL THERAPIST

## 2023-04-24 PROCEDURE — 97110 THERAPEUTIC EXERCISES: CPT | Performed by: PHYSICAL THERAPIST

## 2023-04-24 NOTE — PROGRESS NOTES
functional goals listed. [] Progression is slowed due to complexities/Impairments listed. [] Progression has been slowed due to co-morbidities. [] Plan just implemented, too soon to assess goals progression <30days   [] Goals require adjustment due to lack of progress  [] Patient is not progressing as expected and requires additional follow up with physician  [] Other    Prognosis for POC: [x] Good [] Fair  [] Poor      Patient requires continued skilled intervention: [x] Yes  [] No    Treatment/Activity Tolerance:  [x] Patient able to complete treatment  [] Patient limited by fatigue  [] Patient limited by pain    [] Patient limited by other medical complications  [] Other:         PLAN: 2x/wk for 4 weeks    [x] Continue per plan of care [] Alter current plan   [] Plan of care initiated [] Hold pending MD visit [] Discharge      Electronically signed by:        Asiya Smart, PT, DPT, OCS, OMT-C    Board-Certified Orthopaedic Clinical Specialist    [address] [address] PT license: [id number]  [place] PT license: [id number]      Note: If patient does not return for scheduled/ recommended follow up visits, this note will serve as a discharge from care along with most recent update on progress.

## 2023-04-26 ENCOUNTER — TREATMENT (OUTPATIENT)
Dept: PHYSICAL THERAPY | Age: 48
End: 2023-04-26
Payer: COMMERCIAL

## 2023-04-26 DIAGNOSIS — M25.662 DECREASED ROM OF LEFT KNEE: ICD-10-CM

## 2023-04-26 DIAGNOSIS — M25.562 LEFT KNEE PAIN, UNSPECIFIED CHRONICITY: Primary | ICD-10-CM

## 2023-04-26 PROCEDURE — 97110 THERAPEUTIC EXERCISES: CPT | Performed by: PHYSICAL THERAPIST

## 2023-04-26 PROCEDURE — 97112 NEUROMUSCULAR REEDUCATION: CPT | Performed by: PHYSICAL THERAPIST

## 2023-04-26 PROCEDURE — 97530 THERAPEUTIC ACTIVITIES: CPT | Performed by: PHYSICAL THERAPIST

## 2023-04-26 NOTE — PROGRESS NOTES
[facility]   Phone: [phone]    Fax: [phone]       Physical Therapy Treatment Note/ Progress Report:           Date:  2023    Patient Name:  Mervat Cagle    :  1975  MRN: [id number]  Restrictions/Precautions:    Medical/Treatment Diagnosis Information:  Diagnosis: L knee pain (chronic)     Insurance/Certification information:  PT Insurance Information: BCBS  Physician Information:  Referring Practitioner: ANICEOT [place]  Has the plan of care been signed (Y/N):        [x]  Yes  []  No     Date of Patient follow up with Physician: PRN      Is this a Progress Report:     []  Yes  [x]  No        If Yes:  Date Range for reporting period:  Beginnin/3/2023   Endin/3/2023    Progress report will be due (10 Rx or 30 days whichever is less): 152       Recertification will be due (POC Duration  / 90 days whichever is less): 5/3/2023         Visit # Insurance Allowable Auth Required   11 30  Hard max []  Yes [x]  No        Functional Scale:   LEFS: 29/80, 63.75% limitation   Date assessed:  2023   LEFS: 48/80, 40% limitation    Date assessed:  4/3/2023    Latex Allergy:  [x]NO      []YES  Preferred Language for Healthcare:   [x]English       []other:      Pain level:  5/10     SUBJECTIVE:    Patient reports that today is a rough day. She reports increased soreness in the L knee and required wearing her brace most of the day at work. She reports continued frustrations with these frequent flare ups.         OBJECTIVE:     Flexibility L R Comment:  4/3/2023   Hamstring Fair       Gastroc Fair       ITB         Quad  Fair                               ROM PROM AROM Comment:  4/3/2023     L R L R     Flexion     130° 120°     Extension     +2° hyperextension 0°                                       Strength L R Comment:  4/3/2023   Quad 4+/5 5/5     Hamstring 4+/5 5/5     Gastroc         Posterior Tib 4/5*     Hip abd 4+/5 5/5 In

## 2023-04-28 ENCOUNTER — TELEPHONE (OUTPATIENT)
Dept: PULMONOLOGY | Age: 48
End: 2023-04-28

## 2023-04-28 NOTE — TELEPHONE ENCOUNTER
I have asked central scheduling to call pt to set up CT. She tried earlier today but they would not schedule because pt has never smoked. When I spoke to central scheduling they did not understand why pt was not able to schedule.

## 2023-05-01 ENCOUNTER — HOSPITAL ENCOUNTER (OUTPATIENT)
Dept: CT IMAGING | Age: 48
Discharge: HOME | End: 2023-05-01
Payer: COMMERCIAL

## 2023-05-01 DIAGNOSIS — R91.1 PULMONARY NODULE: ICD-10-CM

## 2023-05-01 PROCEDURE — 71250 CT THORAX DX C-: CPT

## 2023-05-02 ENCOUNTER — OFFICE VISIT (OUTPATIENT)
Dept: PULMONOLOGY | Age: 48
End: 2023-05-02
Payer: COMMERCIAL

## 2023-05-02 VITALS
WEIGHT: 242 LBS | BODY MASS INDEX: 42.88 KG/M2 | HEIGHT: 63 IN | SYSTOLIC BLOOD PRESSURE: 122 MMHG | HEART RATE: 68 BPM | OXYGEN SATURATION: 98 % | DIASTOLIC BLOOD PRESSURE: 66 MMHG | RESPIRATION RATE: 16 BRPM

## 2023-05-02 DIAGNOSIS — R91.1 PULMONARY NODULE: Primary | ICD-10-CM

## 2023-05-02 DIAGNOSIS — G47.33 OSA (OBSTRUCTIVE SLEEP APNEA): ICD-10-CM

## 2023-05-02 PROCEDURE — 99214 OFFICE O/P EST MOD 30 MIN: CPT | Performed by: INTERNAL MEDICINE

## 2023-05-02 NOTE — PROGRESS NOTES
Bones: No significant abnormality. Impression       Essentially stable pulmonary nodules, the largest nodule of the superior segment of the right lower lobe measuring up to 1.0 cm. This is stable from February 2021. No more remote prior studies are available. At a minimum, continued imaging surveillance    is recommended. Interval improved appearance of multiple groundglass pulmonary opacities     CT chest 2/17/2021 reveals the following:  FINDINGS:   Mediastinum: Coronary artery calcifications are a marker of atherosclerosis. There are no enlarged thoracic lymph nodes. Lungs/pleura: The tracheobronchial tree is patent. There is no pneumothorax   or pleural effusion. Throughout the bilateral lungs, there is nonspecific ground-glass opacities   favoring infectious/inflammatory process. In addition, there are multiple   solid pulmonary nodules within the superior segment of the right lower lobe,   the largest of which measures 0.9 x 1.2 cm. Upper Abdomen: Status post cholecystectomy. Soft Tissues/Bones: Degenerative changes involve the thoracic spine. There   is no fluid collection, soft tissue or fat mass in the left paracentral back   at the indicated marker. A cardiac digital recorder device is present within   the left breast.           Impression   1. No superficial mass or fluid collection within the left back. 2. Nonspecific bilateral ground-glass opacities favoring   infectious/inflammatory process including atypical viral pneumonia. 3. Multiple solid right lower lobe pulmonary nodules. Last PFTs:  None on file    Assessment:      Diagnosis Orders   1. Pulmonary nodule        2. ABBY (obstructive sleep apnea)              Plan:     CT chest was reviewed with Norman Randall and her .   The solid right lower lobe pulmonary nodule is actually a little bit smaller over her 2-year surveillance (10 mm from 12 mm 2/2021)  Despite the occasion and reading on

## 2023-05-03 ENCOUNTER — TREATMENT (OUTPATIENT)
Dept: PHYSICAL THERAPY | Age: 48
End: 2023-05-03
Payer: COMMERCIAL

## 2023-05-03 DIAGNOSIS — M25.662 DECREASED ROM OF LEFT KNEE: ICD-10-CM

## 2023-05-03 DIAGNOSIS — M25.562 LEFT KNEE PAIN, UNSPECIFIED CHRONICITY: Primary | ICD-10-CM

## 2023-05-03 PROCEDURE — 97110 THERAPEUTIC EXERCISES: CPT | Performed by: PHYSICAL THERAPIST

## 2023-05-03 PROCEDURE — 97530 THERAPEUTIC ACTIVITIES: CPT | Performed by: PHYSICAL THERAPIST

## 2023-05-03 PROCEDURE — 97112 NEUROMUSCULAR REEDUCATION: CPT | Performed by: PHYSICAL THERAPIST

## 2023-05-03 PROCEDURE — 97016 VASOPNEUMATIC DEVICE THERAPY: CPT | Performed by: PHYSICAL THERAPIST

## 2023-05-03 NOTE — PROGRESS NOTES
[facility]   Phone: [phone]    Fax: [phone]     Physical Therapy Re-Certification Plan of Care    Dear ANICETO Mendez,    We had the pleasure of treating the following patient for physical therapy services at [address] [place] [address]. A summary of our findings can be found in the updated assessment below. This includes our plan of care. If you have any questions or concerns regarding these findings, please do not hesitate to contact me at the office phone number checked above.   Thank you for the referral.     Physician Signature:________________________________Date:__________________  By signing above (or electronic signature), therapists plan is approved by physician      Overall Response to Treatment:   [x]Patient is responding well to treatment and improvement is noted with regards  to goals   [x]Patient should continue to improve in reasonable time if they continue HEP   []Patient has plateaued and is no longer responding to skilled PT intervention    []Patient is getting worse and would benefit from return to referring MD   []Patient unable to adhere to initial POC   []Other:        Physical Therapy Treatment Note/ Progress Report:           Date:  5/3/2023    Patient Name:  Leann Ureña    :  1975  MRN: [id number]  Restrictions/Precautions:    Medical/Treatment Diagnosis Information:  Diagnosis: L knee pain (chronic)     Insurance/Certification information:  PT Insurance Information: BCBS  Physician Information:  Referring Practitioner: ANICETO Mendez  Has the plan of care been signed (Y/N):        []  Yes  [x]  No     Date of Patient follow up with Physician: PRN      Is this a Progress Report:     [x]  Yes  []  No        If Yes:  Date Range for reporting period:  Beginnin/3/2023 Update NPV  Endin/3/2023    Progress report will be due (10 Rx or 30 days whichever is less): 6/3/2023

## 2023-05-10 ENCOUNTER — TREATMENT (OUTPATIENT)
Dept: PHYSICAL THERAPY | Age: 48
End: 2023-05-10
Payer: COMMERCIAL

## 2023-05-10 DIAGNOSIS — M25.662 DECREASED ROM OF LEFT KNEE: ICD-10-CM

## 2023-05-10 DIAGNOSIS — M25.562 LEFT KNEE PAIN, UNSPECIFIED CHRONICITY: Primary | ICD-10-CM

## 2023-05-10 PROCEDURE — 97112 NEUROMUSCULAR REEDUCATION: CPT | Performed by: PHYSICAL THERAPIST

## 2023-05-10 PROCEDURE — 97016 VASOPNEUMATIC DEVICE THERAPY: CPT | Performed by: PHYSICAL THERAPIST

## 2023-05-10 PROCEDURE — 97110 THERAPEUTIC EXERCISES: CPT | Performed by: PHYSICAL THERAPIST

## 2023-05-10 PROCEDURE — 97530 THERAPEUTIC ACTIVITIES: CPT | Performed by: PHYSICAL THERAPIST

## 2023-05-10 NOTE — PROGRESS NOTES
[facility]   Phone: [phone]    Fax: [phone]        Physical Therapy Treatment Note/ Progress Report:           Date:  5/10/2023    Patient Name:  Martha Strauss    :  1975  MRN: [id number]  Restrictions/Precautions:    Medical/Treatment Diagnosis Information:  Diagnosis: L knee pain (chronic)     Insurance/Certification information:  PT Insurance Information: BCBS  Physician Information:  Referring Practitioner: ANICETO Ch  Has the plan of care been signed (Y/N):        [x]  Yes  []  No     Date of Patient follow up with Physician: PRN      Is this a Progress Report:     []  Yes  [x]  No        If Yes:  Date Range for reporting period:  Beginnin/3/2023   Endin/3/2023    Progress report will be due (10 Rx or 30 days whichever is less):        Recertification will be due (POC Duration  / 90 days whichever is less): 6/3/2023         Visit # Insurance Allowable Auth Required   13 30  Hard max []  Yes [x]  No        Functional Scale:   LEFS: 29/80, 63.75% limitation   Date assessed:  2023   LEFS: 48/80, 40% limitation    Date assessed:  4/3/2023   LEFS: 47/80, 40% limitation    Date assessed:  4/3/2023     Latex Allergy:  [x]NO      []YES  Preferred Language for Healthcare:   [x]English       []other:      Pain level:  6/10     SUBJECTIVE:    Patient reports that her knee is again bothering her. She notes that she is having a lot of pain going up and down stairs the last few days, and is having to wait when initiating gait after sitting for any period of time.          OBJECTIVE:     Flexibility L R Comment:  5/3/2023   Hamstring Fair+       Gastroc Fair+       ITB         Quad  Fair                               ROM PROM AROM Comment:  5/3/2023     L R L R     Flexion     132° 120°     Extension     +2° hyperextension 0°                                       Strength L R Comment:  5/3/2023   Quad      Hamstring

## 2023-08-31 ENCOUNTER — TELEPHONE (OUTPATIENT)
Dept: PULMONOLOGY | Age: 48
End: 2023-08-31

## 2023-08-31 NOTE — TELEPHONE ENCOUNTER
Appt on 9/5--we never heard back from pt on where she would like her APAP order sent. Did you still want to see pt?

## 2023-10-03 ENCOUNTER — HOSPITAL ENCOUNTER (OUTPATIENT)
Dept: GENERAL RADIOLOGY | Age: 48
Discharge: HOME | End: 2023-10-03
Payer: COMMERCIAL

## 2023-10-03 ENCOUNTER — OFFICE VISIT (OUTPATIENT)
Dept: PRIMARY CARE CLINIC | Age: 48
End: 2023-10-03
Payer: COMMERCIAL

## 2023-10-03 ENCOUNTER — OFFICE VISIT (OUTPATIENT)
Dept: ENDOCRINOLOGY | Age: 48
End: 2023-10-03
Payer: COMMERCIAL

## 2023-10-03 VITALS
OXYGEN SATURATION: 99 % | HEART RATE: 56 BPM | BODY MASS INDEX: 41.63 KG/M2 | WEIGHT: 235.03 LBS | TEMPERATURE: 97.7 F | DIASTOLIC BLOOD PRESSURE: 66 MMHG | SYSTOLIC BLOOD PRESSURE: 108 MMHG

## 2023-10-03 VITALS
HEIGHT: 63 IN | HEART RATE: 56 BPM | RESPIRATION RATE: 16 BRPM | WEIGHT: 235 LBS | DIASTOLIC BLOOD PRESSURE: 66 MMHG | BODY MASS INDEX: 41.64 KG/M2 | SYSTOLIC BLOOD PRESSURE: 108 MMHG

## 2023-10-03 DIAGNOSIS — M51.36 DDD (DEGENERATIVE DISC DISEASE), LUMBAR: ICD-10-CM

## 2023-10-03 DIAGNOSIS — E21.3 HYPERPARATHYROIDISM (HCC): Primary | ICD-10-CM

## 2023-10-03 DIAGNOSIS — R30.0 DYSURIA: ICD-10-CM

## 2023-10-03 DIAGNOSIS — M54.50 CHRONIC LOW BACK PAIN WITHOUT SCIATICA, UNSPECIFIED BACK PAIN LATERALITY: ICD-10-CM

## 2023-10-03 DIAGNOSIS — E55.9 VITAMIN D DEFICIENCY: ICD-10-CM

## 2023-10-03 DIAGNOSIS — Z87.442 HISTORY OF NEPHROLITHIASIS: ICD-10-CM

## 2023-10-03 DIAGNOSIS — L02.91 ABSCESS: ICD-10-CM

## 2023-10-03 DIAGNOSIS — N20.0 NEPHROLITHIASIS: ICD-10-CM

## 2023-10-03 DIAGNOSIS — Z86.69 HISTORY OF CHIARI MALFORMATION: ICD-10-CM

## 2023-10-03 DIAGNOSIS — E78.2 MIXED HYPERLIPIDEMIA: ICD-10-CM

## 2023-10-03 DIAGNOSIS — G89.29 CHRONIC LOW BACK PAIN WITHOUT SCIATICA, UNSPECIFIED BACK PAIN LATERALITY: ICD-10-CM

## 2023-10-03 DIAGNOSIS — G89.29 CHRONIC LOW BACK PAIN WITHOUT SCIATICA, UNSPECIFIED BACK PAIN LATERALITY: Primary | ICD-10-CM

## 2023-10-03 DIAGNOSIS — M54.50 CHRONIC LOW BACK PAIN WITHOUT SCIATICA, UNSPECIFIED BACK PAIN LATERALITY: Primary | ICD-10-CM

## 2023-10-03 DIAGNOSIS — N20.0 RECURRENT NEPHROLITHIASIS: ICD-10-CM

## 2023-10-03 PROBLEM — M51.369 DDD (DEGENERATIVE DISC DISEASE), LUMBAR: Status: ACTIVE | Noted: 2023-10-03

## 2023-10-03 LAB
ANION GAP SERPL CALCULATED.3IONS-SCNC: 12 MMOL/L (ref 3–16)
BILIRUBIN, POC: NEGATIVE
BLOOD URINE, POC: NEGATIVE
BUN SERPL-MCNC: 20 MG/DL (ref 7–20)
CALCIUM SERPL-MCNC: 9.4 MG/DL (ref 8.3–10.6)
CHLORIDE SERPL-SCNC: 102 MMOL/L (ref 99–110)
CLARITY, POC: CLEAR
CO2 SERPL-SCNC: 28 MMOL/L (ref 21–32)
COLOR, POC: YELLOW
CREAT SERPL-MCNC: 0.9 MG/DL (ref 0.6–1.1)
GFR SERPLBLD CREATININE-BSD FMLA CKD-EPI: >60 ML/MIN/{1.73_M2}
GLUCOSE SERPL-MCNC: 78 MG/DL (ref 70–99)
GLUCOSE URINE, POC: NEGATIVE
KETONES, POC: NEGATIVE
LEUKOCYTE EST, POC: NEGATIVE
NITRITE, POC: NEGATIVE
PH, POC: 6
POTASSIUM SERPL-SCNC: 4.5 MMOL/L (ref 3.5–5.1)
PROTEIN, POC: NEGATIVE
PTH-INTACT SERPL-MCNC: 35.7 PG/ML (ref 14–72)
SODIUM SERPL-SCNC: 142 MMOL/L (ref 136–145)
SPECIFIC GRAVITY, POC: >=1.03
UROBILINOGEN, POC: 0.2

## 2023-10-03 PROCEDURE — 72100 X-RAY EXAM L-S SPINE 2/3 VWS: CPT

## 2023-10-03 PROCEDURE — 99214 OFFICE O/P EST MOD 30 MIN: CPT | Performed by: NURSE PRACTITIONER

## 2023-10-03 PROCEDURE — 99203 OFFICE O/P NEW LOW 30 MIN: CPT | Performed by: INTERNAL MEDICINE

## 2023-10-03 PROCEDURE — 81002 URINALYSIS NONAUTO W/O SCOPE: CPT | Performed by: NURSE PRACTITIONER

## 2023-10-03 RX ORDER — SULFAMETHOXAZOLE AND TRIMETHOPRIM 800; 160 MG/1; MG/1
TABLET ORAL
Qty: 14 TABLET | Refills: 0 | Status: SHIPPED | OUTPATIENT
Start: 2023-10-03

## 2023-10-03 RX ORDER — METHOCARBAMOL 750 MG/1
750 TABLET, FILM COATED ORAL 3 TIMES DAILY
Qty: 30 TABLET | Refills: 1 | Status: SHIPPED | OUTPATIENT
Start: 2023-10-03 | End: 2023-10-13

## 2023-10-03 ASSESSMENT — PATIENT HEALTH QUESTIONNAIRE - PHQ9
SUM OF ALL RESPONSES TO PHQ QUESTIONS 1-9: 0
1. LITTLE INTEREST OR PLEASURE IN DOING THINGS: 0
SUM OF ALL RESPONSES TO PHQ QUESTIONS 1-9: 0
2. FEELING DOWN, DEPRESSED OR HOPELESS: 0
SUM OF ALL RESPONSES TO PHQ9 QUESTIONS 1 & 2: 0

## 2023-10-03 ASSESSMENT — ENCOUNTER SYMPTOMS
NAUSEA: 0
COUGH: 0
DIARRHEA: 0
SORE THROAT: 0
VOMITING: 0
ABDOMINAL DISTENTION: 0
CONSTIPATION: 1
BACK PAIN: 1
ABDOMINAL PAIN: 0
WHEEZING: 0
BLOOD IN STOOL: 0
TROUBLE SWALLOWING: 1
CHEST TIGHTNESS: 0
SHORTNESS OF BREATH: 0

## 2023-10-03 NOTE — PROGRESS NOTES
Bridgette Lara MD at [address] [place] [address]  11/1/2022    EGD ESOPHAGOGASTRODUODENOSCOPY DILATATION performed by Bridgette Lara MD at [address]       Family History   Problem Relation Age of Onset    Arthritis Mother     COPD Mother     High Blood Pressure Mother     Other Mother         demen    COPD Father     Diabetes Father     High Cholesterol Father     High Blood Pressure Father     Atrial Fibrillation Father     Other Father         copd    Heart Attack Father     Cancer Brother        Social History     Socioeconomic History    Marital status:      Spouse name: Not on file    Number of children: Not on file    Years of education: Not on file    Highest education level: Not on file   Occupational History    Not on file   Tobacco Use    Smoking status: Never    Smokeless tobacco: Never   Vaping Use    Vaping Use: Never used   Substance and Sexual Activity    Alcohol use: Yes     Comment: 2 drinks per year    Drug use: Never    Sexual activity: Not on file   Other Topics Concern    Not on file   Social History Narrative    Not on file     Social Determinants of Health     Financial Resource Strain: Low Risk  (2/13/2023)    Overall Financial Resource Strain (CARDIA)     Difficulty of Paying Living Expenses: Not hard at all   Food Insecurity: No Food Insecurity (2/13/2023)    Hunger Vital Sign     Worried About Running Out of Food in the Last Year: Never true     [address] in the Last Year: Never true   Transportation Needs: Unknown (2/13/2023)    PRAPARE - Transportation     Lack of Transportation (Medical): Not on file     Lack of Transportation (Non-Medical):  No   Physical Activity: Not on file   Stress: Not on file   Social Connections: Not on file   Intimate Partner Violence: Not on file   Housing Stability: Unknown (2/13/2023)    Housing Stability Vital Sign     Unable to Pay for Housing in the Last Year: Not on file     Number of Places Lived in the (0) independent

## 2023-10-03 NOTE — PROGRESS NOTES
Stan Crank, APRN - CNP as PCP - General (Family Nurse Practitioner)  JEANNE Melendez CNP as PCP - Empaneled Provider  Huy Swanson MD as Surgeon (General Surgery)  Amanda Elkins MD as Consulting Physician (Pulmonology)    Electronically signed by JEANNE Melendez CNP on 10/3/2023 at 10:00 AM

## 2023-10-04 LAB
25(OH)D3 SERPL-MCNC: 43.7 NG/ML
MAGNESIUM SERPL-MCNC: 2.1 MG/DL (ref 1.8–2.4)
PHOSPHATE SERPL-MCNC: 3.6 MG/DL (ref 2.5–4.9)

## 2023-10-05 LAB — BACTERIA UR CULT: NORMAL

## 2023-10-06 LAB — 1,25(OH)2D3 SERPL-MCNC: 42.5 PG/ML (ref 19.9–79.3)

## 2023-10-10 ENCOUNTER — OFFICE VISIT (OUTPATIENT)
Dept: PHYSICAL THERAPY | Age: 48
End: 2023-10-10

## 2023-10-10 DIAGNOSIS — M54.50 LOW BACK PAIN WITHOUT SCIATICA, UNSPECIFIED BACK PAIN LATERALITY, UNSPECIFIED CHRONICITY: Primary | ICD-10-CM

## 2023-10-10 DIAGNOSIS — M51.36 DDD (DEGENERATIVE DISC DISEASE), LUMBAR: ICD-10-CM

## 2023-10-10 DIAGNOSIS — M53.86 DECREASED ROM OF LUMBAR SPINE: ICD-10-CM

## 2023-10-10 NOTE — PROGRESS NOTES
Screening completed  [] PCP notified via Plan of Care  [] Emergency services notified     Preferred Language for Healthcare:   [x] English       [] other:    SUBJECTIVE EXAMINATION     Patient stated complaint: Patient reports that her pain began with the L knee which she completed PT and knee pain improved. She notes that L hip pain began while the knee was still hurting, and then transitioned into L glute pain. She notes that the LBP continued and then was fully exacerbated after lifting something heavy. She notes that she has been undergoing massage therapy and they have seen an abnormality in the tissue, but she notes that when she told her physician they did not order any extra imaging. She states that a week and a half ago was her last massage and she was unable to get off the table for several minutes because pain was so severe. She needs help getting out of bed some mornings and feels she is walking bent over. She notes pain limited ADLs and function. Patient is also restricted in sitting and standing tolerance, <90 minutes, she also reports pain with lifting. She also notes pain in the bottoms of her feet periodically.      Relevant Medical History: OA, osteoporosis/osteopenia,  x2    Pain:  Patient Scale: VAS: 3/10 at best, 10/10 at worst  Pain location: B hips, across low back  Patient describes pain to be constant, Sharp, dull, and aching  Pain decreases with: Resting, Ice, Medication, and stretching  Pain increases with: Prolonged standing, Prolonged walking, Prolonged sitting, and lifting    Red Flags:  None    Functional Outcome Measure:    Date Assessed 10/10/2023   Measure Used Modified Oswestry   Disability Score (rawscore/%) 16/50,  32% limitation      Occupation/School:  Work/School Status: Full time  Job Duties/Demands: Prolonged Standing  Heavy lifting  Repetitive lifting  Awkward Positions    Sport/recreational activities: n/a    OBJECTIVE EXAMINATION     ROM/Strength: (Blank cells
for activities related to strengthening, flexibility, endurance, ROM performed to prevent loss of range of motion, maintain or improve muscular strength or increase flexibility, following either an injury or surgery. (01131) [facility]- Reviewed/Progressed HEP activities related to strengthening, flexibility, endurance, ROM performed to prevent loss of range of motion, maintain or improve muscular strength or increase flexibility, following either an injury or surgery. (89642) NEUROMUSCULAR RE-EDUCATION- Therapeutic procedure, 1 or more areas, each 15 minutes; neuromuscular reeducation of movement, balance, coordination, kinesthetic sense, posture, and/or proprioception for sitting and/or standing activities  (66492) [facility]- Reviewed/Progressed HEP activities related to neuromuscular reeducation of movement, balance, coordination, kinesthetic sense, posture, and/or proprioception for sitting and/or standing activities    (11805) THERAPEUTIC ACTIVITY- use of dynamic activities to improve functional performance. (Ex include squatting, ascending/descending stairs, walking, bending, lifting, catching, throwing, pushing, pulling, jumping.)  Direct, one on one contact, billed in 15-minute increments. (36549) MANUAL THERAPY-  Manual therapy techniques, 1 or more regions, each 15 minutes (Mobilization/manipulation, manual lymphatic drainage, manual traction) for the purpose of modulating pain, promoting relaxation,  increasing ROM, reducing/eliminating soft tissue swelling/inflammation/restriction, improving soft tissue extensibility and allowing for proper ROM for normal function with self care, mobility, lifting and ambulation  (66336) Needle insertion(s) without injection; 1 or 2 muscle(s).   (82238) Needle insertion(s) without injection; 3 or more muscle(s)    TREATMENT PLAN   Plan: POC Initiated today- see eval for details    Electronically Signed by Marito Wiley PT

## 2023-10-17 ENCOUNTER — TREATMENT (OUTPATIENT)
Dept: PHYSICAL THERAPY | Age: 48
End: 2023-10-17
Payer: COMMERCIAL

## 2023-10-17 DIAGNOSIS — M54.50 LOW BACK PAIN WITHOUT SCIATICA, UNSPECIFIED BACK PAIN LATERALITY, UNSPECIFIED CHRONICITY: Primary | ICD-10-CM

## 2023-10-17 DIAGNOSIS — M53.86 DECREASED ROM OF LUMBAR SPINE: ICD-10-CM

## 2023-10-17 DIAGNOSIS — M51.36 DDD (DEGENERATIVE DISC DISEASE), LUMBAR: ICD-10-CM

## 2023-10-17 PROCEDURE — 97112 NEUROMUSCULAR REEDUCATION: CPT | Performed by: PHYSICAL THERAPIST

## 2023-10-17 PROCEDURE — 97140 MANUAL THERAPY 1/> REGIONS: CPT | Performed by: PHYSICAL THERAPIST

## 2023-10-17 PROCEDURE — 97110 THERAPEUTIC EXERCISES: CPT | Performed by: PHYSICAL THERAPIST

## 2023-10-17 NOTE — PROGRESS NOTES
[facility]            [facility] [address] [address]              N:[phone]  S:[phone]      Physical Therapy: TREATMENT/PROGRESS NOTE   Patient: Saray Wright (50 y.o. female)   Treatment Date: 10/17/2023   :  1975 MRN: [id number]   Visit #: 2  Insurance Allowable Auth Needed   17  Hard max []Yes    [x]No    Insurance: Payor: Loreto Mis / Plan: Carlos Jackson / Product Type: *No Product type* /   Insurance ID: [id number] - ([facility])  Secondary Insurance (if applicable):    Treatment Diagnosis: M54.50- chronic LBP w/o sciatica, M51.36-DDD-lumbar  No diagnosis found. Medical Diagnosis:    Chronic low back pain without sciatica, unspecified back pain laterality [M54.50, G89.29];DDD (degenerative disc disease), lumbar [M51.36]   Referring Physician: JEANNE Kelley *  PCP: JEANNE Kelley - CNP                             Plan of care signed (Y/N): N    Date of Patient follow up with Physician: PRN     Progress Report/POC: EVAL today and Date Range for this report: 10/17/23  POC update due: 2023 (OR 10 visits /OR [address], whichever is less)    Latex Allergy:  [x]NO      []YES    Preferred Language for Healthcare:   [x]English       []other:    SUBJECTIVE EXAMINATION     Patient Report/Comments: Pt has been able to transfers better w/ less pain. She is still going to massage therapist that helps w/ tightness. Compliant w/ HEP and doing well. Pleased w/ progress.     OBJECTIVE EXAMINATION     Observation:     Test measurements: see eval     Test used Initial score  10/10/23 10/17/2023   Pain Summary VAS 3/10 at best, 10/10 at worst    Functional questionnaire Modified Oswestry 16/50;  32% limitation    Other:                ROM   Comments:  10/10/2023   Trunk flexion 80%     Trunk extension 60%     Trunk R sidebend 50%     Trunk L sidebend 50%     Trunk R rotation 90% stretch   Trunk L rotation 90% stretch

## 2023-10-23 RX ORDER — FLUTICASONE PROPIONATE AND SALMETEROL XINAFOATE 115; 21 UG/1; UG/1
2 AEROSOL, METERED RESPIRATORY (INHALATION) 2 TIMES DAILY
Qty: 12 G | Refills: 1 | Status: SHIPPED | OUTPATIENT
Start: 2023-10-23

## 2023-10-23 RX ORDER — ALBUTEROL SULFATE 90 UG/1
2 AEROSOL, METERED RESPIRATORY (INHALATION) EVERY 6 HOURS PRN
Qty: 18 G | Refills: 1 | Status: SHIPPED | OUTPATIENT
Start: 2023-10-23

## 2023-10-23 NOTE — TELEPHONE ENCOUNTER
Medication:   Requested Prescriptions     Pending Prescriptions Disp Refills    ADVAIR -21 MCG/ACT inhaler [Pharmacy Med Name: Advair -21 MCG/ACT Inhalation Aerosol] 12 g 0     Sig: Inhale 2 puffs by mouth twice daily    albuterol sulfate HFA (PROVENTIL;VENTOLIN;PROAIR) 108 (90 Base) MCG/ACT inhaler [Pharmacy Med Name: Albuterol Sulfate  (90 Base) MCG/ACT Inhalation Aerosol Solution] 18 g 0     Sig: INHALE 2 PUFFS BY MOUTH EVERY 6 HOURS AS NEEDED FOR WHEEZING     Last Filled:  albuterol - 1/25/2022    Last appt: 10/3/2023   Next appt: 2/20/2024    Last OARRS:        No data to display

## 2023-11-07 ENCOUNTER — TREATMENT (OUTPATIENT)
Dept: PHYSICAL THERAPY | Age: 48
End: 2023-11-07

## 2023-11-07 DIAGNOSIS — M51.36 DDD (DEGENERATIVE DISC DISEASE), LUMBAR: ICD-10-CM

## 2023-11-07 DIAGNOSIS — M54.50 LOW BACK PAIN WITHOUT SCIATICA, UNSPECIFIED BACK PAIN LATERALITY, UNSPECIFIED CHRONICITY: Primary | ICD-10-CM

## 2023-11-07 DIAGNOSIS — M53.86 DECREASED ROM OF LUMBAR SPINE: ICD-10-CM

## 2023-11-07 NOTE — PROGRESS NOTES
well [] Patient limited by fatique  [] Patient limited by pain  [] Patient limited by other medical complications  [] Other:     Return to Play: NA    Prognosis for POC: [x] Good [] Fair  [] Poor    Patient requires continued skilled intervention: [x] Yes  [] No      GOALS     Patient stated goal: Improve mobility without LBP  Status:  [] Progressing: [] Met: [] Not Met: [] Adjusted    Therapist goals for Patient:   Short Term Goals: To be achieved in: 2 weeks  Independent in HEP and progression per patient tolerance, in order to progress toward full function and prevent re-injury. Status: [] Progressing: [] Met: [] Not Met: [] Adjusted  Patient will have a decrease in pain to 1/10 to help  facilitate improvement in movement, function, and ADLs as indicated by functional deficits. Status: [] Progressing: [] Met: [] Not Met: [] Adjusted    Long Term Goals: To be achieved in: 4-6 weeks  Disability index score of 15% or less for the Modified Oswestry to assist with return top prior level of function. Status: [] Progressing: [] Met: [] Not Met: [] Adjusted  Improve lumbar flexion, extension, and B side bending AROM to >90%, >75%, and >75%  or  better to allow for proper joint functioning as indicated by patients functional deficits. Status: [] Progressing: [] Met: [] Not Met: [] Adjusted  Pt to improve strength to show motor control/activation of multifidus and TrA/abdominal to facilitate functional mobility and ADLs. Status: [] Progressing: [] Met: [] Not Met: [] Adjusted  Patient will return to  lifting an object from the floor  that weights 20# without increased symptoms or restriction to work towards return to prior level of function. Status: [] Progressing: [] Met: [] Not Met: [] Adjusted  Patient will be able to sit > 2 hours for work and rest  without pain 90% of the time so that she may return to PLOF for ADLs.              Status: [] Progressing: [] Met: [] Not Met: [] Adjusted    Overall

## 2023-11-14 ENCOUNTER — TREATMENT (OUTPATIENT)
Dept: PHYSICAL THERAPY | Age: 48
End: 2023-11-14
Payer: COMMERCIAL

## 2023-11-14 DIAGNOSIS — M51.36 DDD (DEGENERATIVE DISC DISEASE), LUMBAR: ICD-10-CM

## 2023-11-14 DIAGNOSIS — M53.86 DECREASED ROM OF LUMBAR SPINE: ICD-10-CM

## 2023-11-14 DIAGNOSIS — M54.50 LOW BACK PAIN WITHOUT SCIATICA, UNSPECIFIED BACK PAIN LATERALITY, UNSPECIFIED CHRONICITY: Primary | ICD-10-CM

## 2023-11-14 PROCEDURE — 97530 THERAPEUTIC ACTIVITIES: CPT | Performed by: PHYSICAL THERAPIST

## 2023-11-14 PROCEDURE — 97140 MANUAL THERAPY 1/> REGIONS: CPT | Performed by: PHYSICAL THERAPIST

## 2023-11-14 PROCEDURE — 97112 NEUROMUSCULAR REEDUCATION: CPT | Performed by: PHYSICAL THERAPIST

## 2023-11-14 PROCEDURE — 97110 THERAPEUTIC EXERCISES: CPT | Performed by: PHYSICAL THERAPIST

## 2023-11-14 NOTE — PROGRESS NOTES
Date: 81/90/1536     Board-Certified Orthopaedic Clinical Specialist    [address] PT license: [id number]  [place] PT license: [id number]      Note: If patient does not return for scheduled/recommended follow up visits, this note will serve as a discharge from care along with the most recent update on progress.

## 2023-11-21 ENCOUNTER — OFFICE VISIT (OUTPATIENT)
Dept: PRIMARY CARE CLINIC | Age: 48
End: 2023-11-21
Payer: COMMERCIAL

## 2023-11-21 ENCOUNTER — TREATMENT (OUTPATIENT)
Dept: PHYSICAL THERAPY | Age: 48
End: 2023-11-21

## 2023-11-21 VITALS
WEIGHT: 235.05 LBS | BODY MASS INDEX: 42.31 KG/M2 | SYSTOLIC BLOOD PRESSURE: 122 MMHG | HEART RATE: 72 BPM | OXYGEN SATURATION: 98 % | TEMPERATURE: 98.2 F | DIASTOLIC BLOOD PRESSURE: 78 MMHG

## 2023-11-21 DIAGNOSIS — M54.16 LUMBAR RADICULOPATHY: ICD-10-CM

## 2023-11-21 DIAGNOSIS — M54.50 LOW BACK PAIN WITHOUT SCIATICA, UNSPECIFIED BACK PAIN LATERALITY, UNSPECIFIED CHRONICITY: Primary | ICD-10-CM

## 2023-11-21 DIAGNOSIS — M53.86 DECREASED ROM OF LUMBAR SPINE: ICD-10-CM

## 2023-11-21 DIAGNOSIS — M51.36 DDD (DEGENERATIVE DISC DISEASE), LUMBAR: Primary | ICD-10-CM

## 2023-11-21 DIAGNOSIS — J45.30 MILD PERSISTENT ASTHMA WITHOUT COMPLICATION: ICD-10-CM

## 2023-11-21 DIAGNOSIS — M51.36 DDD (DEGENERATIVE DISC DISEASE), LUMBAR: ICD-10-CM

## 2023-11-21 PROCEDURE — 99214 OFFICE O/P EST MOD 30 MIN: CPT | Performed by: NURSE PRACTITIONER

## 2023-11-21 RX ORDER — FLUTICASONE PROPIONATE AND SALMETEROL XINAFOATE 115; 21 UG/1; UG/1
2 AEROSOL, METERED RESPIRATORY (INHALATION) 2 TIMES DAILY
Qty: 12 G | Refills: 2 | Status: SHIPPED | OUTPATIENT
Start: 2023-11-21

## 2023-11-21 RX ORDER — ALBUTEROL SULFATE 90 UG/1
2 AEROSOL, METERED RESPIRATORY (INHALATION) EVERY 6 HOURS PRN
Qty: 18 G | Refills: 2 | Status: SHIPPED | OUTPATIENT
Start: 2023-11-21

## 2023-11-21 RX ORDER — ALBUTEROL SULFATE 2.5 MG/3ML
2.5 SOLUTION RESPIRATORY (INHALATION) 4 TIMES DAILY PRN
Qty: 120 EACH | Refills: 2 | Status: SHIPPED | OUTPATIENT
Start: 2023-11-21

## 2023-11-21 ASSESSMENT — ENCOUNTER SYMPTOMS
SORE THROAT: 0
DIARRHEA: 0
CONSTIPATION: 1
VOMITING: 0
NAUSEA: 0
TROUBLE SWALLOWING: 1
SHORTNESS OF BREATH: 0
BACK PAIN: 1
WHEEZING: 0
CHEST TIGHTNESS: 0
ABDOMINAL DISTENTION: 0
BLOOD IN STOOL: 0
ABDOMINAL PAIN: 0
COUGH: 0

## 2023-11-21 NOTE — PROGRESS NOTES
ACTIVITY- use of dynamic activities to improve functional performance. (Ex include squatting, ascending/descending stairs, walking, bending, lifting, catching, throwing, pushing, pulling, jumping.)  Direct, one on one contact, billed in 15-minute increments. (23561) MANUAL THERAPY-  Manual therapy techniques, 1 or more regions, each 15 minutes (Mobilization/manipulation, manual lymphatic drainage, manual traction) for the purpose of modulating pain, promoting relaxation,  increasing ROM, reducing/eliminating soft tissue swelling/inflammation/restriction, improving soft tissue extensibility and allowing for proper ROM for normal function with self care, mobility, lifting and ambulation  (51190) Needle insertion(s) without injection; 1 or 2 muscle(s). (95965) Needle insertion(s) without injection; 3 or more muscle(s)    TREATMENT PLAN   Plan: Cont POC- Continue emphasis/focus on exercise progression, improving proper muscle recruitment and activation/motor control patterns, and modulating pain. Next visit plan to add new exercises and continue current phase     Electronically Signed by Sophia Hobson PT              Date: 21/67/3633     Board-Certified Orthopaedic Clinical Specialist    [address] [place] [address] PT license: [id number]  [facility] PT license: [id number]      Note: If patient does not return for scheduled/recommended follow up visits, this note will serve as a discharge from care along with the most recent update on progress.

## 2023-11-27 ENCOUNTER — TELEPHONE (OUTPATIENT)
Dept: PRIMARY CARE CLINIC | Age: 48
End: 2023-11-27

## 2023-11-27 DIAGNOSIS — M51.36 DDD (DEGENERATIVE DISC DISEASE), LUMBAR: Primary | ICD-10-CM

## 2023-11-27 DIAGNOSIS — M54.16 LUMBAR RADICULOPATHY: ICD-10-CM

## 2023-11-27 DIAGNOSIS — M54.50 CHRONIC LOW BACK PAIN WITHOUT SCIATICA, UNSPECIFIED BACK PAIN LATERALITY: ICD-10-CM

## 2023-11-27 DIAGNOSIS — G89.29 CHRONIC LOW BACK PAIN WITHOUT SCIATICA, UNSPECIFIED BACK PAIN LATERALITY: ICD-10-CM

## 2023-11-27 NOTE — TELEPHONE ENCOUNTER
Received message from radiology.   New order placed for MRI of lumbar spine with and without contrast per radiology request

## 2023-11-27 NOTE — TELEPHONE ENCOUNTER
Pt called in to clarify order for MRI with or with out contrast.This needs to be corrected before the 4th because pt needs it prior to her orthopedics spine appointment on that day. Please contact pt for more info.

## 2023-11-28 ENCOUNTER — TREATMENT (OUTPATIENT)
Dept: PHYSICAL THERAPY | Age: 48
End: 2023-11-28
Payer: COMMERCIAL

## 2023-11-28 DIAGNOSIS — M51.36 DDD (DEGENERATIVE DISC DISEASE), LUMBAR: ICD-10-CM

## 2023-11-28 DIAGNOSIS — M53.86 DECREASED ROM OF LUMBAR SPINE: ICD-10-CM

## 2023-11-28 DIAGNOSIS — M54.50 LOW BACK PAIN WITHOUT SCIATICA, UNSPECIFIED BACK PAIN LATERALITY, UNSPECIFIED CHRONICITY: Primary | ICD-10-CM

## 2023-11-28 LAB — MAMMOGRAPHY, EXTERNAL: NORMAL

## 2023-11-28 PROCEDURE — 97140 MANUAL THERAPY 1/> REGIONS: CPT | Performed by: PHYSICAL THERAPIST

## 2023-11-28 PROCEDURE — 97110 THERAPEUTIC EXERCISES: CPT | Performed by: PHYSICAL THERAPIST

## 2023-11-28 PROCEDURE — 97530 THERAPEUTIC ACTIVITIES: CPT | Performed by: PHYSICAL THERAPIST

## 2023-11-28 NOTE — PROGRESS NOTES
[facility]            [address] [place] [address]              [phone]  T:[phone]          Physical Therapy: TREATMENT/PROGRESS NOTE   Patient: Zayda De Los Santos (27 y.o. female)   Treatment Date: 2023   :  1975 MRN: [id number]   Visit #: 6  Insurance Allowable Auth Needed   17  Hard max []Yes    [x]No    Insurance: Payor: Jose Maria Sessions / Plan: Buck Guadalupe / Product Type: *No Product type* /   Insurance ID: [id number] - ([facility])  Secondary Insurance (if applicable):    Treatment Diagnosis: M54.50- chronic LBP w/o sciatica, M51.36-DDD-lumbar    ICD-10-CM    1. Low back pain without sciatica, unspecified back pain laterality, unspecified chronicity  M54.50       2. DDD (degenerative disc disease), lumbar  M51.36       3. Decreased ROM of lumbar spine  M53.86            Medical Diagnosis:    Chronic low back pain without sciatica, unspecified back pain laterality [M54.50, G89.29];DDD (degenerative disc disease), lumbar [M51.36]   Referring Physician: JEANNE Keen *  PCP: JEANNE Keen - ANUSHA                             Plan of care signed (Y/N): Y    Date of Patient follow up with Physician: 2023     Progress Report/POC: NO  POC update due: 2023 (OR 10 visits /OR [address], whichever is less)    Latex Allergy:  [x]NO      []YES    Preferred Language for Healthcare:   [x]English       []other:    SUBJECTIVE EXAMINATION     Patient Report/Comments: Patient reports that low back seems a little less tight and that she has had several instances of getting back to crack at night which reduces pain/discomfort. She notes that the L hip is also not quite as sore or aching this week. She notes that she is trying to get her MRI scheduled for this week and currently has an appointment with a spine physician on 2023 as long as MRI is completed prior to the appointment.      OBJECTIVE EXAMINATION

## 2023-12-05 ENCOUNTER — TREATMENT (OUTPATIENT)
Dept: PHYSICAL THERAPY | Age: 48
End: 2023-12-05
Payer: COMMERCIAL

## 2023-12-05 DIAGNOSIS — M53.86 DECREASED ROM OF LUMBAR SPINE: ICD-10-CM

## 2023-12-05 DIAGNOSIS — M51.36 DDD (DEGENERATIVE DISC DISEASE), LUMBAR: ICD-10-CM

## 2023-12-05 DIAGNOSIS — M54.50 LOW BACK PAIN WITHOUT SCIATICA, UNSPECIFIED BACK PAIN LATERALITY, UNSPECIFIED CHRONICITY: Primary | ICD-10-CM

## 2023-12-05 PROCEDURE — 97530 THERAPEUTIC ACTIVITIES: CPT | Performed by: PHYSICAL THERAPIST

## 2023-12-05 PROCEDURE — 97110 THERAPEUTIC EXERCISES: CPT | Performed by: PHYSICAL THERAPIST

## 2023-12-05 PROCEDURE — 97140 MANUAL THERAPY 1/> REGIONS: CPT | Performed by: PHYSICAL THERAPIST

## 2023-12-05 NOTE — PROGRESS NOTES
to co-morbidities. [] Plan just implemented, too soon (<30days) to assess goals progression   [] Goals require adjustment due to lack of progress  [x] Patient is not progressing as expected and requires additional follow up with physician  [] Other:     CHARGE CAPTURE     CHARGE GRID   CPT Code (TIMED) minutes # CPT Code (UNTIMED) #     [x] Therex (95120)  (22') 1  [x] EVAL:LOW (58891 - Typically 20 minutes face-to-face)     [] Neuromusc. Re-ed (13038)    [] Re-Eval (08788)     [x] Manual (61216) (8') 1  [] Estim Unattended (28586)     [x] Ther. Act (33778) (18') 1  [] Mech. Traction (45081)     [] Gait (87057)    [] Dry Needle 1-2 muscle (41484)     [] Aquatic Therex (62467)    [] Dry Needle 3+ muscle (29591)     [] Iontophoresis (50770)    [] VASO (07026)     [] Ultrasound (62249)    [] Group Therapy (54408)     [] Estim Attended (13699)    [] Other: Total Timed Code Tx Minutes 48'        Total Treatment Minutes 62'        Charge Justification:  (48432) THERAPEUTIC EXERCISE - Provided verbal/tactile cueing for activities related to strengthening, flexibility, endurance, ROM performed to prevent loss of range of motion, maintain or improve muscular strength or increase flexibility, following either an injury or surgery. (73771) [facility]- Reviewed/Progressed HEP activities related to strengthening, flexibility, endurance, ROM performed to prevent loss of range of motion, maintain or improve muscular strength or increase flexibility, following either an injury or surgery.   (80064) NEUROMUSCULAR RE-EDUCATION- Therapeutic procedure, 1 or more areas, each 15 minutes; neuromuscular reeducation of movement, balance, coordination, kinesthetic sense, posture, and/or proprioception for sitting and/or standing activities  (22453) [facility]- Reviewed/Progressed HEP activities related to neuromuscular reeducation of movement, balance, coordination, kinesthetic sense, posture, and/or proprioception

## 2023-12-08 ENCOUNTER — HOSPITAL ENCOUNTER (OUTPATIENT)
Dept: MRI IMAGING | Age: 48
Discharge: HOME | End: 2023-12-08
Payer: COMMERCIAL

## 2023-12-08 DIAGNOSIS — M51.36 DDD (DEGENERATIVE DISC DISEASE), LUMBAR: ICD-10-CM

## 2023-12-08 DIAGNOSIS — G89.29 CHRONIC LOW BACK PAIN WITHOUT SCIATICA, UNSPECIFIED BACK PAIN LATERALITY: ICD-10-CM

## 2023-12-08 DIAGNOSIS — M54.50 CHRONIC LOW BACK PAIN WITHOUT SCIATICA, UNSPECIFIED BACK PAIN LATERALITY: ICD-10-CM

## 2023-12-08 DIAGNOSIS — M54.16 LUMBAR RADICULOPATHY: ICD-10-CM

## 2023-12-08 PROCEDURE — A9579 GAD-BASE MR CONTRAST NOS,1ML: HCPCS | Performed by: NURSE PRACTITIONER

## 2023-12-08 PROCEDURE — 6360000004 HC RX CONTRAST MEDICATION: Performed by: NURSE PRACTITIONER

## 2023-12-08 PROCEDURE — 72158 MRI LUMBAR SPINE W/O & W/DYE: CPT

## 2023-12-08 RX ADMIN — GADOTERIDOL 20 ML: 279.3 INJECTION, SOLUTION INTRAVENOUS at 10:24

## 2023-12-15 ENCOUNTER — TELEPHONE (OUTPATIENT)
Dept: PRIMARY CARE CLINIC | Age: 48
End: 2023-12-15

## 2023-12-15 NOTE — TELEPHONE ENCOUNTER
Patient called and stated to know her result of MRI LUMBAR SPINE W WO CONTRAST     Which she has done on 12/08    Please review and advice    Thanks

## 2024-01-10 DIAGNOSIS — D17.1 LIPOMA OF TORSO: Primary | ICD-10-CM

## 2024-01-16 ENCOUNTER — TREATMENT (OUTPATIENT)
Dept: PHYSICAL THERAPY | Age: 49
End: 2024-01-16
Payer: COMMERCIAL

## 2024-01-16 DIAGNOSIS — M51.36 DDD (DEGENERATIVE DISC DISEASE), LUMBAR: ICD-10-CM

## 2024-01-16 DIAGNOSIS — M54.50 LOW BACK PAIN WITHOUT SCIATICA, UNSPECIFIED BACK PAIN LATERALITY, UNSPECIFIED CHRONICITY: Primary | ICD-10-CM

## 2024-01-16 DIAGNOSIS — M53.86 DECREASED ROM OF LUMBAR SPINE: ICD-10-CM

## 2024-01-16 PROCEDURE — 97530 THERAPEUTIC ACTIVITIES: CPT | Performed by: PHYSICAL THERAPIST

## 2024-01-16 PROCEDURE — 97110 THERAPEUTIC EXERCISES: CPT | Performed by: PHYSICAL THERAPIST

## 2024-01-16 PROCEDURE — 97140 MANUAL THERAPY 1/> REGIONS: CPT | Performed by: PHYSICAL THERAPIST

## 2024-01-16 NOTE — PROGRESS NOTES
[id number]        Note: If patient does not return for scheduled/recommended follow up visits, this note will serve as a discharge from care along with the most recent update on progress.

## 2024-01-29 ENCOUNTER — INITIAL CONSULT (OUTPATIENT)
Dept: SURGERY | Age: 49
End: 2024-01-29
Payer: COMMERCIAL

## 2024-01-29 VITALS
WEIGHT: 236.8 LBS | SYSTOLIC BLOOD PRESSURE: 126 MMHG | BODY MASS INDEX: 41.96 KG/M2 | HEIGHT: 63 IN | DIASTOLIC BLOOD PRESSURE: 80 MMHG

## 2024-01-29 DIAGNOSIS — D17.79 LIPOMA OF OTHER SPECIFIED SITES: Primary | ICD-10-CM

## 2024-01-29 PROCEDURE — 99202 OFFICE O/P NEW SF 15 MIN: CPT | Performed by: SURGERY

## 2024-01-29 RX ORDER — DICYCLOMINE HYDROCHLORIDE 10 MG/1
10 CAPSULE ORAL
COMMUNITY

## 2024-01-29 NOTE — PROGRESS NOTES
Department of General Surgery Consult    PATIENT NAME: Rere Cao   YOB: 1975       TODAY'S DATE: 2024    Reason for Consult:  lipoma    Chief Complaint:  lipoma    Historian: self     Requesting Physician:  Dr. Connell    HISTORY OF PRESENT ILLNESS:              The patient is a 48 y.o. female who presents with lipoma. Multiple lipomas on abdomin and extremities. There are 4 in abdomin and 4 on her lower extremity that is causing her pain. All lesions are painful. Abdomin hurts with distension worse with with laying down 5/10. Dull pain. Sharp pain in legs constant sharp leg pain 7/10. Patient had several removed before. Patient had lipomas removed from arms with benign cytology.     Past Medical History:        Diagnosis Date    Acid reflux     Asthma     Chronic back pain     Colon polyp     tubular adenoma    Implantable loop recorder present     Kidney stones     Osteoarthritis     Palpitations     Sleep apnea     not currently using CPAP       Past Surgical History:        Procedure Laterality Date     SECTION      CHOLECYSTECTOMY      COLONOSCOPY N/A 2022    COLONOSCOPY POLYPECTOMY SNARE/COLD BIOPSY performed by Coral Connell MD at [facility] ENDOSCOPY    HYSTERECTOMY, VAGINAL      KIDNEY STONE SURGERY      UPPER GASTROINTESTINAL ENDOSCOPY N/A 2022    EGD BIOPSY performed by Coral Connell MD at [facility] ENDOSCOPY    UPPER GASTROINTESTINAL ENDOSCOPY  2022    EGD ESOPHAGOGASTRODUODENOSCOPY DILATATION performed by Coral Connell MD at [facility] ENDOSCOPY       Current Medications:   No current facility-administered medications for this visit.  Prior to Admission medications    Medication Sig Start Date End Date Taking? Authorizing Provider   dicyclomine (BENTYL) 10 MG capsule Take 1 capsule by mouth 4 times daily (before meals and nightly)   Yes Provider, MD Tae   albuterol sulfate HFA (PROVENTIL;VENTOLIN;PROAIR) 108 (90 Base) MCG/ACT inhaler

## 2024-01-30 ENCOUNTER — TREATMENT (OUTPATIENT)
Dept: PHYSICAL THERAPY | Age: 49
End: 2024-01-30
Payer: COMMERCIAL

## 2024-01-30 ENCOUNTER — TELEPHONE (OUTPATIENT)
Dept: SURGERY | Age: 49
End: 2024-01-30

## 2024-01-30 DIAGNOSIS — M53.86 DECREASED ROM OF LUMBAR SPINE: ICD-10-CM

## 2024-01-30 DIAGNOSIS — M51.36 DDD (DEGENERATIVE DISC DISEASE), LUMBAR: ICD-10-CM

## 2024-01-30 DIAGNOSIS — M54.50 LOW BACK PAIN WITHOUT SCIATICA, UNSPECIFIED BACK PAIN LATERALITY, UNSPECIFIED CHRONICITY: Primary | ICD-10-CM

## 2024-01-30 PROCEDURE — 97110 THERAPEUTIC EXERCISES: CPT | Performed by: PHYSICAL THERAPIST

## 2024-01-30 PROCEDURE — 97530 THERAPEUTIC ACTIVITIES: CPT | Performed by: PHYSICAL THERAPIST

## 2024-01-30 PROCEDURE — 97140 MANUAL THERAPY 1/> REGIONS: CPT | Performed by: PHYSICAL THERAPIST
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
## 2024-01-30 NOTE — TELEPHONE ENCOUNTER
Pt was seen in the office on 01/29/24 and was given surgery instructions for excision of torso and bilateral arm and leg lipomas (general anesthesia). Scheduled on Fri 03/15/24 @ 0730/0600 arrival @ [facility] after midnight fredrick before surgery, pt will need  day of surgery, Dr Lepe to complete pre-op physical, pt understood and agreed to above noted.

## 2024-02-06 ENCOUNTER — TREATMENT (OUTPATIENT)
Dept: PHYSICAL THERAPY | Age: 49
End: 2024-02-06
Payer: COMMERCIAL

## 2024-02-06 DIAGNOSIS — M54.50 LOW BACK PAIN WITHOUT SCIATICA, UNSPECIFIED BACK PAIN LATERALITY, UNSPECIFIED CHRONICITY: Primary | ICD-10-CM

## 2024-02-06 DIAGNOSIS — M51.36 DDD (DEGENERATIVE DISC DISEASE), LUMBAR: ICD-10-CM

## 2024-02-06 DIAGNOSIS — M53.86 DECREASED ROM OF LUMBAR SPINE: ICD-10-CM

## 2024-02-06 PROCEDURE — 97530 THERAPEUTIC ACTIVITIES: CPT | Performed by: PHYSICAL THERAPIST

## 2024-02-06 PROCEDURE — 97140 MANUAL THERAPY 1/> REGIONS: CPT | Performed by: PHYSICAL THERAPIST

## 2024-02-06 PROCEDURE — 97110 THERAPEUTIC EXERCISES: CPT | Performed by: PHYSICAL THERAPIST

## 2024-02-06 NOTE — PROGRESS NOTES
slowed due to co-morbidities.  [] Plan just implemented, too soon (<30days) to assess goals progression   [] Goals require adjustment due to lack of progress  [x] Patient is not progressing as expected and requires additional follow up with physician  [] Other:     CHARGE CAPTURE     CHARGE GRID   CPT Code (TIMED) minutes # CPT Code (UNTIMED) #     [x] Therex (70345)  (21') 1  [x] EVAL:LOW (21663 - Typically 20 minutes face-to-face)     [] Neuromusc. Re-ed (99398)    [] Re-Eval (89147)     [x] Manual (65971) (8') 1  [] Estim Unattended (52788)     [x] Ther. Act (62097) (16') 1  [] Mech. Traction (90997)     [] Gait (40266)    [] Dry Needle 1-2 muscle (20560)     [] Aquatic Therex (71910)    [] Dry Needle 3+ muscle (20561)     [] Iontophoresis (32018)    [] VASO (32422)     [] Ultrasound (21646)    [] Group Therapy (91556)     [] Estim Attended (17574)    [] Other:    Total Timed Code Tx Minutes 45'        Total Treatment Minutes 50'        Charge Justification:  (11211) THERAPEUTIC EXERCISE - Provided verbal/tactile cueing for activities related to strengthening, flexibility, endurance, ROM performed to prevent loss of range of motion, maintain or improve muscular strength or increase flexibility, following either an injury or surgery.   (42376) HOME EXERCISE PROGRAM- Reviewed/Progressed HEP activities related to strengthening, flexibility, endurance, ROM performed to prevent loss of range of motion, maintain or improve muscular strength or increase flexibility, following either an injury or surgery.  (23815) NEUROMUSCULAR RE-EDUCATION- Therapeutic procedure, 1 or more areas, each 15 minutes; neuromuscular reeducation of movement, balance, coordination, kinesthetic sense, posture, and/or proprioception for sitting and/or standing activities  (45171) HOME EXERCISE PROGRAM- Reviewed/Progressed HEP activities related to neuromuscular reeducation of movement, balance, coordination, kinesthetic sense, posture, and/or

## 2024-02-20 ENCOUNTER — TELEPHONE (OUTPATIENT)
Dept: PRIMARY CARE CLINIC | Age: 49
End: 2024-02-20

## 2024-02-20 DIAGNOSIS — B00.1 RECURRENT COLD SORES: ICD-10-CM

## 2024-02-20 RX ORDER — VALACYCLOVIR HYDROCHLORIDE 500 MG/1
TABLET, FILM COATED ORAL
Qty: 30 TABLET | Refills: 2 | Status: SHIPPED | OUTPATIENT
Start: 2024-02-20

## 2024-02-20 NOTE — TELEPHONE ENCOUNTER
Pt states she is out of town and is having a flare up pt is requesting   valACYclovir (VALTREX) 500 MG tablet [[id number]]     [facility] [address] [place] [address] [phone] -  [phone] [[id number]]

## 2024-02-20 NOTE — TELEPHONE ENCOUNTER
Patient calling back - states is only in [place] until tomorrow  Patient requesting medication sent today before pharmacy closes    valACYclovir (VALTREX) 500 MG tablet [[id number]]     Please send to:  [facility] [address] [place] [address] -  [phone] -  [phone] [[id number]]

## 2024-02-20 NOTE — TELEPHONE ENCOUNTER
Medication:   Requested Prescriptions     Pending Prescriptions Disp Refills    valACYclovir (VALTREX) 500 MG tablet 30 tablet 2     Sig: Take 1,000mg on day 1, followed by 500mg daily x 5 days prn for outbreak     Last Filled:  3.7.22    Last appt: 11/21/2023   Next appt: 2/26/2024    Last OARRS:        No data to display

## 2024-02-25 NOTE — PROGRESS NOTES
Preoperative Consultation    Name: Rere Cao  YOB: 1975    This patient presents to the office today for a preoperative consultation at the request of surgeon, Dr.J. Khalil, who plans on performing EXCISION OF TORSO, BILATERAL ARM AND LEG LIPOMAS  on March 15, 2024 at [facility].      Planned anesthesia: General   Known anesthesia problems: None   Bleeding risk: No recent or remote history of abnormal bleeding  Personal or FH ofDVT/PE: No        LIPOMAS  Has few in both legs and in abdomen.   Are now painful  Had other lipomas removed many years ago.     NEPHRO  History of recurrent kidney stones  Will complete 24 hr urine in March  Following with nephrology.     ENDO  Referred to endocrinology for elevated PTH  Was on thiazide for a while.    ASTHMA  On Advair and Singulair as needed during allergy season.  Uses albuterol inhaler and nebulizer as needed    BACK PAIN  History of chronic back pain    GERD:  H/o barretts.   On Nexium  Was seen in ED in  for abd pain  Followed up with GI  Started on Trulance  Has rx Bentyl for prn use.     WT  Had recent apt with wt management [facility]  Will start on semaglutide soon.  Patient has concerns      Patient Active Problem List   Diagnosis    Flank lipoma    History of Chiari malformation    Polyp of colon    Lung nodules    Abnormal CT scan of lung    History of nephrolithiasis    Recurrent cold sores    Intermittent palpitations    Chronic neck pain    Morbid obesity with BMI of 40.0-44.9, adult (HCC)    Gastroesophageal reflux disease without esophagitis    Odonnell's esophagus without dysplasia    Mixed hyperlipidemia    Mild persistent asthma without complication    Family history of renal cell carcinoma    Chronic pain of left knee    Vitamin D deficiency    Elevated parathyroid hormone    DDD (degenerative disc disease), lumbar    Hyperparathyroidism (HCC)     Past Surgical History:   Procedure Laterality Date     SECTION

## 2024-02-26 ENCOUNTER — OFFICE VISIT (OUTPATIENT)
Dept: PRIMARY CARE CLINIC | Age: 49
End: 2024-02-26
Payer: COMMERCIAL

## 2024-02-26 VITALS
DIASTOLIC BLOOD PRESSURE: 62 MMHG | HEIGHT: 62 IN | WEIGHT: 237.8 LBS | HEART RATE: 66 BPM | TEMPERATURE: 97.4 F | BODY MASS INDEX: 43.76 KG/M2 | SYSTOLIC BLOOD PRESSURE: 116 MMHG | OXYGEN SATURATION: 98 %

## 2024-02-26 DIAGNOSIS — Z13.1 SCREENING FOR DIABETES MELLITUS: ICD-10-CM

## 2024-02-26 DIAGNOSIS — K22.70 BARRETT'S ESOPHAGUS WITHOUT DYSPLASIA: ICD-10-CM

## 2024-02-26 DIAGNOSIS — Z87.442 HISTORY OF NEPHROLITHIASIS: ICD-10-CM

## 2024-02-26 DIAGNOSIS — E66.01 MORBID OBESITY WITH BMI OF 40.0-44.9, ADULT (HCC): ICD-10-CM

## 2024-02-26 DIAGNOSIS — J45.30 MILD PERSISTENT ASTHMA WITHOUT COMPLICATION: ICD-10-CM

## 2024-02-26 DIAGNOSIS — D17.79 LIPOMA OF OTHER SPECIFIED SITES: ICD-10-CM

## 2024-02-26 DIAGNOSIS — E78.2 MIXED HYPERLIPIDEMIA: ICD-10-CM

## 2024-02-26 DIAGNOSIS — K21.9 GASTROESOPHAGEAL REFLUX DISEASE WITHOUT ESOPHAGITIS: ICD-10-CM

## 2024-02-26 DIAGNOSIS — E21.3 HYPERPARATHYROIDISM (HCC): ICD-10-CM

## 2024-02-26 DIAGNOSIS — Z01.818 PREOP EXAMINATION: Primary | ICD-10-CM

## 2024-02-26 LAB
ALBUMIN SERPL-MCNC: 4.7 G/DL (ref 3.4–5)
ALBUMIN/GLOB SERPL: 2 {RATIO} (ref 1.1–2.2)
ALP SERPL-CCNC: 54 U/L (ref 40–129)
ALT SERPL-CCNC: 16 U/L (ref 10–40)
ANION GAP SERPL CALCULATED.3IONS-SCNC: 11 MMOL/L (ref 3–16)
AST SERPL-CCNC: 14 U/L (ref 15–37)
BILIRUB SERPL-MCNC: 0.3 MG/DL (ref 0–1)
BUN SERPL-MCNC: 21 MG/DL (ref 7–20)
CALCIUM SERPL-MCNC: 9.7 MG/DL (ref 8.3–10.6)
CHLORIDE SERPL-SCNC: 102 MMOL/L (ref 99–110)
CHOLEST SERPL-MCNC: 227 MG/DL (ref 0–199)
CO2 SERPL-SCNC: 28 MMOL/L (ref 21–32)
CREAT SERPL-MCNC: 0.8 MG/DL (ref 0.6–1.1)
GFR SERPLBLD CREATININE-BSD FMLA CKD-EPI: >60 ML/MIN/{1.73_M2}
GLUCOSE SERPL-MCNC: 86 MG/DL (ref 70–99)
HDLC SERPL-MCNC: 48 MG/DL (ref 40–60)
LDLC SERPL CALC-MCNC: 155 MG/DL
POTASSIUM SERPL-SCNC: 4.7 MMOL/L (ref 3.5–5.1)
PROT SERPL-MCNC: 7.1 G/DL (ref 6.4–8.2)
SODIUM SERPL-SCNC: 141 MMOL/L (ref 136–145)
TRIGL SERPL-MCNC: 121 MG/DL (ref 0–150)
VLDLC SERPL CALC-MCNC: 24 MG/DL

## 2024-02-26 PROCEDURE — 99214 OFFICE O/P EST MOD 30 MIN: CPT | Performed by: NURSE PRACTITIONER

## 2024-02-26 SDOH — ECONOMIC STABILITY: INCOME INSECURITY: HOW HARD IS IT FOR YOU TO PAY FOR THE VERY BASICS LIKE FOOD, HOUSING, MEDICAL CARE, AND HEATING?: NOT HARD AT ALL

## 2024-02-26 SDOH — ECONOMIC STABILITY: FOOD INSECURITY: WITHIN THE PAST 12 MONTHS, THE FOOD YOU BOUGHT JUST DIDN'T LAST AND YOU DIDN'T HAVE MONEY TO GET MORE.: NEVER TRUE

## 2024-02-26 SDOH — ECONOMIC STABILITY: FOOD INSECURITY: WITHIN THE PAST 12 MONTHS, YOU WORRIED THAT YOUR FOOD WOULD RUN OUT BEFORE YOU GOT MONEY TO BUY MORE.: NEVER TRUE

## 2024-02-26 ASSESSMENT — PATIENT HEALTH QUESTIONNAIRE - PHQ9
SUM OF ALL RESPONSES TO PHQ QUESTIONS 1-9: 0
2. FEELING DOWN, DEPRESSED OR HOPELESS: 0
1. LITTLE INTEREST OR PLEASURE IN DOING THINGS: 0
SUM OF ALL RESPONSES TO PHQ9 QUESTIONS 1 & 2: 0
SUM OF ALL RESPONSES TO PHQ QUESTIONS 1-9: 0

## 2024-02-26 ASSESSMENT — ENCOUNTER SYMPTOMS
ABDOMINAL DISTENTION: 0
CONSTIPATION: 1
SHORTNESS OF BREATH: 0
COUGH: 0
ABDOMINAL PAIN: 0
TROUBLE SWALLOWING: 1
VOMITING: 0
BLOOD IN STOOL: 0
NAUSEA: 0
CHEST TIGHTNESS: 0
DIARRHEA: 0
BACK PAIN: 1
WHEEZING: 0
SORE THROAT: 0

## 2024-02-27 ENCOUNTER — TREATMENT (OUTPATIENT)
Dept: PHYSICAL THERAPY | Age: 49
End: 2024-02-27
Payer: COMMERCIAL

## 2024-02-27 DIAGNOSIS — M54.50 LOW BACK PAIN WITHOUT SCIATICA, UNSPECIFIED BACK PAIN LATERALITY, UNSPECIFIED CHRONICITY: Primary | ICD-10-CM

## 2024-02-27 DIAGNOSIS — M53.86 DECREASED ROM OF LUMBAR SPINE: ICD-10-CM

## 2024-02-27 DIAGNOSIS — M51.36 DDD (DEGENERATIVE DISC DISEASE), LUMBAR: ICD-10-CM

## 2024-02-27 LAB
EST. AVERAGE GLUCOSE BLD GHB EST-MCNC: 108.3 MG/DL
HBA1C MFR BLD: 5.4 %

## 2024-02-27 PROCEDURE — 97530 THERAPEUTIC ACTIVITIES: CPT | Performed by: PHYSICAL THERAPIST

## 2024-02-27 PROCEDURE — 97110 THERAPEUTIC EXERCISES: CPT | Performed by: PHYSICAL THERAPIST

## 2024-02-27 PROCEDURE — 97140 MANUAL THERAPY 1/> REGIONS: CPT | Performed by: PHYSICAL THERAPIST

## 2024-02-27 NOTE — PROGRESS NOTES
swelling/inflammation/restriction, improving soft tissue extensibility and allowing for proper ROM for normal function with self care, mobility, lifting and ambulation  (20560) Needle insertion(s) without injection; 1 or 2 muscle(s).  (20561) Needle insertion(s) without injection; 3 or more muscle(s)    TREATMENT PLAN   Plan: Cont POC- Continue emphasis/focus on exercise progression, improving proper muscle recruitment and activation/motor control patterns, and modulating pain. Next visit plan to add new exercises and continue current phase     Electronically Signed by Stephanie Mac, PT              Date: 02/27/2024       Board-Certified Orthopaedic Clinical Specialist    [place] PT license: [id number]  [place] PT license: [id number]        Note: If patient does not return for scheduled/recommended follow up visits, this note will serve as a discharge from care along with the most recent update on progress.

## 2024-03-05 ENCOUNTER — TREATMENT (OUTPATIENT)
Dept: PHYSICAL THERAPY | Age: 49
End: 2024-03-05
Payer: COMMERCIAL

## 2024-03-05 ENCOUNTER — OFFICE VISIT (OUTPATIENT)
Dept: ORTHOPEDIC SURGERY | Age: 49
End: 2024-03-05
Payer: COMMERCIAL

## 2024-03-05 VITALS — BODY MASS INDEX: 43.61 KG/M2 | WEIGHT: 237 LBS | HEIGHT: 62 IN

## 2024-03-05 DIAGNOSIS — M54.50 CHRONIC LEFT-SIDED LOW BACK PAIN, UNSPECIFIED WHETHER SCIATICA PRESENT: ICD-10-CM

## 2024-03-05 DIAGNOSIS — G89.29 CHRONIC LEFT-SIDED LOW BACK PAIN, UNSPECIFIED WHETHER SCIATICA PRESENT: ICD-10-CM

## 2024-03-05 DIAGNOSIS — M54.50 LOW BACK PAIN WITHOUT SCIATICA, UNSPECIFIED BACK PAIN LATERALITY, UNSPECIFIED CHRONICITY: Primary | ICD-10-CM

## 2024-03-05 DIAGNOSIS — M25.552 LEFT HIP PAIN: Primary | ICD-10-CM

## 2024-03-05 DIAGNOSIS — M46.1 SACROILIITIS (HCC): ICD-10-CM

## 2024-03-05 DIAGNOSIS — M53.86 DECREASED ROM OF LUMBAR SPINE: ICD-10-CM

## 2024-03-05 DIAGNOSIS — M51.36 DDD (DEGENERATIVE DISC DISEASE), LUMBAR: ICD-10-CM

## 2024-03-05 PROCEDURE — 97140 MANUAL THERAPY 1/> REGIONS: CPT | Performed by: PHYSICAL THERAPIST

## 2024-03-05 PROCEDURE — 97110 THERAPEUTIC EXERCISES: CPT | Performed by: PHYSICAL THERAPIST

## 2024-03-05 PROCEDURE — 97530 THERAPEUTIC ACTIVITIES: CPT | Performed by: PHYSICAL THERAPIST

## 2024-03-05 PROCEDURE — 99204 OFFICE O/P NEW MOD 45 MIN: CPT | Performed by: PHYSICIAN ASSISTANT

## 2024-03-05 RX ORDER — MELOXICAM 15 MG/1
15 TABLET ORAL DAILY PRN
Qty: 30 TABLET | Refills: 0 | Status: SHIPPED | OUTPATIENT
Start: 2024-03-05 | End: 2024-04-04

## 2024-03-05 NOTE — PROGRESS NOTES
alcohol use. She reports that she does not use drugs.  Family History:   Family History   Problem Relation Age of Onset    Arthritis Mother     COPD Mother     High Blood Pressure Mother     Other Mother         demen    COPD Father     Diabetes Father     High Cholesterol Father     High Blood Pressure Father     Atrial Fibrillation Father     Other Father         copd    Heart Attack Father     Cancer Brother        REVIEW OF SYSTEMS: Full ROS reviewed & scanned into chart  CONSTITUTIONAL: Denies unexplained weight loss, fevers   SKIN: Denies active skin conditions   ENT: Denies dizziness, nosebleeds  RESPIRATORY: Denies difficulty breathing  CARDIOVASCULAR: Denies new chest pain   NEUROLOGICAL: Denies progressive weakness  PSYCHOLOGICAL: Denies anxiety, depression   HEMATOLOGIC: Denies blood disorders, cancer  ENDOCRINE: Denies excessive thirst, heat/cold  GI: Denies current nausea, vomiting, diarrhea   : Denies new bowel or bladder incontinence     Admits history of asthma, headaches, sinus trouble, pneumonia, dyspnea, hemorrhoids, reflux    PHYSICAL EXAM:    Vitals: Height 1.575 m (5' 2\"), weight 107.5 kg (237 lb), not currently breastfeeding. Pain score: 5/10    GENERAL EXAM:  General Apparence: Patient is adequately groomed with no evidence of malnutrition.  Orientation: The patient is oriented to time, place and person.   Mood & Affect:The patient's mood and affect are appropriate   Lymphatic: The lymphatic examination bilaterally reveals all areas to be without enlargement or induration  Sensation: Sensation is intact without deficit  Coordination/Balance: Good coordination     LUMBAR/SACRAL EXAMINATION:  Inspection: Local inspection shows no step-off or bruising.  Lumbar alignment is normal.  Sagittal and Coronal balance is neutral.      Palpation:   No evidence of tenderness at the midline.  Positive tenderness left of midline around L5-S1 and left SI  Range of Motion: Intact flexion mild loss

## 2024-03-07 ENCOUNTER — TELEPHONE (OUTPATIENT)
Dept: ORTHOPEDIC SURGERY | Age: 49
End: 2024-03-07

## 2024-03-07 DIAGNOSIS — M25.552 LEFT HIP PAIN: Primary | ICD-10-CM

## 2024-03-07 NOTE — TELEPHONE ENCOUNTER
S/W the patient regarding pending MRI L Hip authorization. Patient was informed insurance company is requesting XR Left hip imaging to be completed for the patient in order to get MRI approved. She voiced understanding. Order has been placed. Patient was informed she may go to any [facility] to get X-rays completed. Patient will be called back once we received authorization from insurance about MRI. She voiced understanding.

## 2024-03-12 ENCOUNTER — TELEPHONE (OUTPATIENT)
Dept: SURGERY | Age: 49
End: 2024-03-12

## 2024-03-12 ENCOUNTER — TREATMENT (OUTPATIENT)
Dept: PHYSICAL THERAPY | Age: 49
End: 2024-03-12
Payer: COMMERCIAL

## 2024-03-12 DIAGNOSIS — M53.86 DECREASED ROM OF LUMBAR SPINE: ICD-10-CM

## 2024-03-12 DIAGNOSIS — M51.36 DDD (DEGENERATIVE DISC DISEASE), LUMBAR: ICD-10-CM

## 2024-03-12 DIAGNOSIS — M54.50 LOW BACK PAIN WITHOUT SCIATICA, UNSPECIFIED BACK PAIN LATERALITY, UNSPECIFIED CHRONICITY: Primary | ICD-10-CM

## 2024-03-12 PROCEDURE — 97110 THERAPEUTIC EXERCISES: CPT | Performed by: PHYSICAL THERAPIST

## 2024-03-12 PROCEDURE — 97140 MANUAL THERAPY 1/> REGIONS: CPT | Performed by: PHYSICAL THERAPIST

## 2024-03-12 PROCEDURE — 97530 THERAPEUTIC ACTIVITIES: CPT | Performed by: PHYSICAL THERAPIST

## 2024-03-12 NOTE — PROGRESS NOTES
reeducation of movement, balance, coordination, kinesthetic sense, posture, and/or proprioception for sitting and/or standing activities  (10566) HOME EXERCISE PROGRAM- Reviewed/Progressed HEP activities related to neuromuscular reeducation of movement, balance, coordination, kinesthetic sense, posture, and/or proprioception for sitting and/or standing activities    (64438) THERAPEUTIC ACTIVITY- use of dynamic activities to improve functional performance. (Ex include squatting, ascending/descending stairs, walking, bending, lifting, catching, throwing, pushing, pulling, jumping.)  Direct, one on one contact, billed in 15-minute increments.  (34839) MANUAL THERAPY-  Manual therapy techniques, 1 or more regions, each 15 minutes (Mobilization/manipulation, manual lymphatic drainage, manual traction) for the purpose of modulating pain, promoting relaxation,  increasing ROM, reducing/eliminating soft tissue swelling/inflammation/restriction, improving soft tissue extensibility and allowing for proper ROM for normal function with self care, mobility, lifting and ambulation  (42772) Needle insertion(s) without injection; 1 or 2 muscle(s).  (20561) Needle insertion(s) without injection; 3 or more muscle(s)    TREATMENT PLAN   Plan: Cont POC- Continue emphasis/focus on exercise progression, improving proper muscle recruitment and activation/motor control patterns, and modulating pain. Next visit plan to add new exercises and continue current phase     Electronically Signed by Stephanie Mac, PT              Date: 03/12/2024       Board-Certified Orthopaedic Clinical Specialist    [place] PT license: [id number]  [place] PT license: [id number]        Note: If patient does not return for scheduled/recommended follow up visits, this note will serve as a discharge from care along with the most recent update on progress.

## 2024-03-19 ENCOUNTER — PATIENT MESSAGE (OUTPATIENT)
Dept: PRIMARY CARE CLINIC | Age: 49
End: 2024-03-19

## 2024-03-19 NOTE — TELEPHONE ENCOUNTER
From: Rere Cao  To: Amarilis Lepe  Sent: 3/19/2024 6:09 AM EDT  Subject: Blood draw needed    Good morning Amarilis!  I spoke with the NP ar [facility] and I am needing one more blood draw prior to final approval for trizepitide for weight loss. And that is a CRP, C- reactive protein. Can you plesse order this for me? Please let me know if you have any questions.    Thank you  Rere Cao  [phone]

## 2024-04-15 ENCOUNTER — HOSPITAL ENCOUNTER (OUTPATIENT)
Age: 49
Discharge: HOME | End: 2024-04-15
Payer: COMMERCIAL

## 2024-04-15 ENCOUNTER — HOSPITAL ENCOUNTER (OUTPATIENT)
Dept: GENERAL RADIOLOGY | Age: 49
Discharge: HOME | End: 2024-04-15
Payer: COMMERCIAL

## 2024-04-15 DIAGNOSIS — Z87.442 HISTORY OF NEPHROLITHIASIS: ICD-10-CM

## 2024-04-15 DIAGNOSIS — N20.0 RECURRENT NEPHROLITHIASIS: ICD-10-CM

## 2024-04-15 DIAGNOSIS — E21.3 HYPERPARATHYROIDISM (HCC): ICD-10-CM

## 2024-04-15 DIAGNOSIS — M25.552 LEFT HIP PAIN: ICD-10-CM

## 2024-04-15 DIAGNOSIS — E55.9 VITAMIN D DEFICIENCY: ICD-10-CM

## 2024-04-15 LAB
ALBUMIN SERPL-MCNC: 4 G/DL (ref 3.4–5)
ALBUMIN/GLOB SERPL: 1.3 {RATIO} (ref 1.1–2.2)
ALP SERPL-CCNC: 59 U/L (ref 40–129)
ALT SERPL-CCNC: 15 U/L (ref 10–40)
ANION GAP SERPL CALCULATED.3IONS-SCNC: 12 MMOL/L (ref 3–16)
AST SERPL-CCNC: 14 U/L (ref 15–37)
BILIRUB SERPL-MCNC: <0.2 MG/DL (ref 0–1)
BUN SERPL-MCNC: 19 MG/DL (ref 7–20)
CALCIUM SERPL-MCNC: 9.3 MG/DL (ref 8.3–10.6)
CHLORIDE SERPL-SCNC: 102 MMOL/L (ref 99–110)
CO2 SERPL-SCNC: 26 MMOL/L (ref 21–32)
CREAT SERPL-MCNC: 0.9 MG/DL (ref 0.6–1.1)
GFR SERPLBLD CREATININE-BSD FMLA CKD-EPI: 77 ML/MIN/{1.73_M2}
GLUCOSE SERPL-MCNC: 93 MG/DL (ref 70–99)
PHOSPHATE SERPL-MCNC: 3.2 MG/DL (ref 2.5–4.9)
POTASSIUM SERPL-SCNC: 3.8 MMOL/L (ref 3.5–5.1)
PROT SERPL-MCNC: 7 G/DL (ref 6.4–8.2)
SODIUM SERPL-SCNC: 140 MMOL/L (ref 136–145)

## 2024-04-15 PROCEDURE — 84100 ASSAY OF PHOSPHORUS: CPT

## 2024-04-15 PROCEDURE — 73502 X-RAY EXAM HIP UNI 2-3 VIEWS: CPT

## 2024-04-15 PROCEDURE — 36415 COLL VENOUS BLD VENIPUNCTURE: CPT

## 2024-04-15 PROCEDURE — 80053 COMPREHEN METABOLIC PANEL: CPT

## 2024-04-15 PROCEDURE — 82306 VITAMIN D 25 HYDROXY: CPT

## 2024-04-15 PROCEDURE — 83970 ASSAY OF PARATHORMONE: CPT

## 2024-04-16 ENCOUNTER — OFFICE VISIT (OUTPATIENT)
Dept: ENDOCRINOLOGY | Age: 49
End: 2024-04-16
Payer: COMMERCIAL

## 2024-04-16 VITALS
HEIGHT: 63 IN | SYSTOLIC BLOOD PRESSURE: 128 MMHG | RESPIRATION RATE: 16 BRPM | BODY MASS INDEX: 41.64 KG/M2 | DIASTOLIC BLOOD PRESSURE: 88 MMHG | HEART RATE: 69 BPM | WEIGHT: 235 LBS

## 2024-04-16 DIAGNOSIS — E21.3 HYPERPARATHYROIDISM (HCC): ICD-10-CM

## 2024-04-16 DIAGNOSIS — E66.01 CLASS 3 SEVERE OBESITY DUE TO EXCESS CALORIES WITH SERIOUS COMORBIDITY AND BODY MASS INDEX (BMI) OF 40.0 TO 44.9 IN ADULT (HCC): Primary | ICD-10-CM

## 2024-04-16 DIAGNOSIS — E55.9 VITAMIN D DEFICIENCY: ICD-10-CM

## 2024-04-16 DIAGNOSIS — E78.2 MIXED HYPERLIPIDEMIA: ICD-10-CM

## 2024-04-16 LAB
25(OH)D3 SERPL-MCNC: 31.3 NG/ML
PTH-INTACT SERPL-MCNC: 54.6 PG/ML (ref 14–72)

## 2024-04-16 PROCEDURE — 99214 OFFICE O/P EST MOD 30 MIN: CPT | Performed by: INTERNAL MEDICINE

## 2024-04-16 RX ORDER — PLECANATIDE 3 MG/1
1 TABLET ORAL DAILY
COMMUNITY
Start: 2024-03-09

## 2024-04-16 NOTE — PROGRESS NOTES
SUBJECTIVE    Rere Cao is seen for Hyperparathyroidism  .Rere Cao was diagnosed with hyperparathyroidism when she was first noted to have a kidney stone and according to the patient nothing was done for that   .  She had nephrolithiasis at least 2 incidences where it had to be surgically removed first in 2008 and the second 1 in 2018.  DEXA scan in April 2022 Normal BMD   Currently patient takes vitamin D supplement and is not on any calcium supplement.  I reviewed 24-hour urine calcium values from [facility]'s work-up which showed slightly elevated calcium of 276 the upper limit of normal was 200 drawn in April 2023.  Patient was previously prescribed hydrochlorothiazide which she is not taking and would rather not take any medication if possible.  Rere Cao  denies  nonspecific symptoms, such as constipation, fatigue, and depression Patient denies any family history of hypercalcemia or MEN syndrome   In 2008 she had kidney stone , ended up with lithotripsy and stent eventually she recalls that her nephrologist told her she has PTH related issues   In 2018 she had another stone which required cystoscopy and stent   She gets hormonal pellets s from her OB/GYN.    Interim history  She denies any kidney stones, has been taking vitamin D supplementation  Has been going to [facility] physician and has been prescribed tirzepatide for weight loss but has not started taking it.    Chief Complaint   Patient presents with    Other     hyperparathyroidism         Past Medical History:   Diagnosis Date    Acid reflux     Aneurysm (HCC)     Asthma     Odonnell's esophagus     Chronic back pain     low back pain    Colon polyp     tubular adenoma    H/O degenerative disc disease     Hip pain, chronic, left     HPTH (hyperparathyroidism) (HCC)     Implantable loop recorder present     Kidney stones     Lipoma of other specified sites     Osteoarthritis     Palpitations     Sleep apnea     not

## 2024-04-25 ENCOUNTER — TELEPHONE (OUTPATIENT)
Dept: ORTHOPEDIC SURGERY | Age: 49
End: 2024-04-25

## 2024-04-25 NOTE — TELEPHONE ENCOUNTER
Contacted the patient and spoke with her informing her we have her XR Hip results and wanted to schedule the patient for a follow-up appointment. She voiced understanding. Patient was scheduled to follow-up with Brigid on Monday 5/20/24 at 8:40AM at our [place] office. Patient voiced understanding.    Final Anesthesia Post-op Assessment    Patient: Claudine Lawrence  Procedure(s) Performed: TOTAL THYROIDECTOMY, REIMPLANTATION OF PARATHYROID TISSUE  Anesthesia type: General    Vital Last Value   Temperature 36.6 °C (97.8 °F) (04/03/18 1500)   Pulse 68 (04/03/18 1505)   Respiratory Rate 21 (04/03/18 1505)   Non-Invasive   Blood Pressure 166/90 (04/03/18 1505)   Arterial  Blood Pressure     Pulse Oximetry 95 % (04/03/18 1505)     Last 24 I/O:   Intake/Output Summary (Last 24 hours) at 04/03/18 1507  Last data filed at 04/03/18 1406   Gross per 24 hour   Intake              900 ml   Output               75 ml   Net              825 ml       PATIENT LOCATION: PACU Phase 2  POST-OP VITAL SIGNS: stable  LEVEL OF CONSCIOUSNESS: participates in exam and answers questions appropriately  RESPIRATORY STATUS: spontaneous ventilation  CARDIOVASCULAR: blood pressure returned to baseline  HYDRATION: euvolemic    PAIN MANAGEMENT: adequately controlled  NAUSEA: None  AIRWAY PATENCY: patent  POST-OP ASSESSMENT: no complications, patient tolerated procedure well with no complications and sufficiently recovered from acute administration of anesthesia effects and able to participate in evaluation  COMPLICATIONS: none

## 2024-05-20 ENCOUNTER — OFFICE VISIT (OUTPATIENT)
Dept: ORTHOPEDIC SURGERY | Age: 49
End: 2024-05-20
Payer: COMMERCIAL

## 2024-05-20 VITALS — BODY MASS INDEX: 41.64 KG/M2 | HEIGHT: 63 IN | WEIGHT: 235 LBS

## 2024-05-20 DIAGNOSIS — G89.29 CHRONIC LEFT-SIDED LOW BACK PAIN, UNSPECIFIED WHETHER SCIATICA PRESENT: ICD-10-CM

## 2024-05-20 DIAGNOSIS — M54.50 CHRONIC LEFT-SIDED LOW BACK PAIN, UNSPECIFIED WHETHER SCIATICA PRESENT: ICD-10-CM

## 2024-05-20 DIAGNOSIS — M46.1 SACROILIITIS (HCC): ICD-10-CM

## 2024-05-20 DIAGNOSIS — M25.552 LEFT HIP PAIN: Primary | ICD-10-CM

## 2024-05-20 PROCEDURE — 99213 OFFICE O/P EST LOW 20 MIN: CPT | Performed by: PHYSICIAN ASSISTANT

## 2024-05-20 NOTE — PROGRESS NOTES
reflux     Aneurysm (HCC)     Asthma     Odonnell's esophagus     Chronic back pain     low back pain    Colon polyp     tubular adenoma    H/O degenerative disc disease     Hip pain, chronic, left     HPTH (hyperparathyroidism) (HCC)     Implantable loop recorder present     Kidney stones     Lipoma of other specified sites     Osteoarthritis     Palpitations     Sleep apnea     not currently using CPAP      Past Surgical History:     Past Surgical History:   Procedure Laterality Date     SECTION      CHOLECYSTECTOMY      COLONOSCOPY N/A 2022    COLONOSCOPY POLYPECTOMY SNARE/COLD BIOPSY performed by Coral Connell MD at [facility] ENDOSCOPY    HYSTERECTOMY, VAGINAL      KIDNEY STONE SURGERY      UPPER GASTROINTESTINAL ENDOSCOPY N/A 2022    EGD BIOPSY performed by Coral Connell MD at [facility] ENDOSCOPY    UPPER GASTROINTESTINAL ENDOSCOPY  2022    EGD ESOPHAGOGASTRODUODENOSCOPY DILATATION performed by Coral Connell MD at [facility] ENDOSCOPY     Current Medications:     Current Outpatient Medications:     TRULANCE 3 MG TABS, Take 1 tablet by mouth daily (Patient not taking: Reported on 2024), Disp: , Rfl:     Tirzepatide (MOUNJARO) 2.5 MG/0.5ML SOPN SC injection, Inject 0.5 mLs into the skin once a week Hasn't started it yet, Disp: , Rfl:     meloxicam (MOBIC) 15 MG tablet, Take 1 tablet by mouth daily as needed for Pain, Disp: 30 tablet, Rfl: 0    valACYclovir (VALTREX) 500 MG tablet, Take 1,000mg on day 1, followed by 500mg daily x 5 days prn for outbreak, Disp: 30 tablet, Rfl: 2    dicyclomine (BENTYL) 10 MG capsule, Take 1 capsule by mouth 4 times daily (before meals and nightly), Disp: , Rfl:     albuterol sulfate HFA (PROVENTIL;VENTOLIN;PROAIR) 108 (90 Base) MCG/ACT inhaler, Inhale 2 puffs into the lungs every 6 hours as needed for Wheezing, Disp: 18 g, Rfl: 2    fluticasone-salmeterol (ADVAIR HFA) 115-21 MCG/ACT inhaler, Inhale 2 puffs into the lungs 2 times daily, Disp: 12 g, Rfl: 2

## 2024-05-23 ENCOUNTER — TELEPHONE (OUTPATIENT)
Dept: ORTHOPEDIC SURGERY | Age: 49
End: 2024-05-23

## 2024-05-23 NOTE — TELEPHONE ENCOUNTER
Attempted to reach Rere Cao  regarding MRI LEFT HIP approval and authorization being valid until 7/18/2024.  Patient was instructed that their MRI needs to be scheduled at [facility]. The patient was instructed to contact the facility to schedule  at [phone].    A follow up appointment will need to be scheduled to review the results and treatment plan.

## 2024-08-21 NOTE — PROGRESS NOTES
acute distress.     Appearance: Normal appearance. She is well-developed and normal weight. She is not diaphoretic.   Neck:      Vascular: No carotid bruit.   Cardiovascular:      Rate and Rhythm: Normal rate and regular rhythm.      Heart sounds: Normal heart sounds. No murmur heard.     No friction rub.   Pulmonary:      Effort: Pulmonary effort is normal. No respiratory distress.      Breath sounds: Normal breath sounds. No wheezing, rhonchi or rales.   Skin:     General: Skin is warm and dry.      Findings: No rash.   Neurological:      Mental Status: She is alert and oriented to person, place, and time.      Motor: No weakness.      Gait: Gait normal.   Psychiatric:         Mood and Affect: Mood normal.         Behavior: Behavior normal.          Patient Active Problem List   Diagnosis    Flank lipoma    History of Chiari malformation    Polyp of colon    Lung nodules    Abnormal CT scan of lung    History of nephrolithiasis    Recurrent cold sores    Intermittent palpitations    Chronic neck pain    Morbid obesity with BMI of 40.0-44.9, adult (HCC)    Gastroesophageal reflux disease without esophagitis    Odonnell's esophagus without dysplasia    Mixed hyperlipidemia    Mild persistent asthma without complication    Family history of renal cell carcinoma    Chronic pain of left knee    Vitamin D deficiency    Elevated parathyroid hormone    DDD (degenerative disc disease), lumbar    Hyperparathyroidism (HCC)      Allergies   Allergen Reactions    Coconut Flavor      Other Reaction(s): Comment:COCONUT    Adhesive Tape Rash    Albuterol Sulfate Other (See Comments)     Patient states it feels like they're shooting fire into her mouth when she takes this inhaler.   Patient states she CAN have albuterol inhaler.    Coconut Fatty Acids Itching and Other (See Comments)     Pt states tongue feels \"fuzzy\" with coconut     Current Outpatient Medications on File Prior to Visit   Medication Sig Dispense Refill     hyoscyamine (ANASPAZ;LEVSIN) 0.125 MG tablet Take 1 tablet by mouth every 4 hours as needed      vitamin B-12 (CYANOCOBALAMIN) 100 MCG tablet Take 0.5 tablets by mouth daily      Multiple Vitamins-Minerals (ONE-A-DAY 50 PLUS PO) Take 1 capsule by mouth daily      TRULANCE 3 MG TABS Take 1 tablet by mouth daily      Tirzepatide (MOUNJARO) 2.5 MG/0.5ML SOPN SC injection Inject 0.5 mLs into the skin once a week Hasn't started it yet      valACYclovir (VALTREX) 500 MG tablet Take 1,000mg on day 1, followed by 500mg daily x 5 days prn for outbreak 30 tablet 2    dicyclomine (BENTYL) 10 MG capsule Take 1 capsule by mouth 4 times daily (before meals and nightly)      albuterol (PROVENTIL) (2.5 MG/3ML) 0.083% nebulizer solution Take 3 mLs by nebulization 4 times daily as needed for Wheezing 120 each 2    OMEGA-3 FATTY ACIDS PO Take by mouth      esomeprazole Magnesium (NEXIUM) 40 MG PACK Take 1 packet by mouth daily as needed      Cholecalciferol (VITAMIN D3 PO) Take by mouth      meloxicam (MOBIC) 15 MG tablet Take 1 tablet by mouth daily as needed for Pain 30 tablet 0     No current facility-administered medications on file prior to visit.      Social History     Tobacco Use    Smoking status: Never    Smokeless tobacco: Never   Substance Use Topics    Alcohol use: Not Currently     Comment: 2 drinks per year        CARE TEAM  Patient Care Team:  Amarilis Lepe APRN - CNP as PCP - General (Family Nurse Practitioner)  Amarilis Lepe APRN - CNP as PCP - Empaneled Provider  Shyam Delgadillo MD as Surgeon (General Surgery)  Kwame Lopez MD as Consulting Physician (Pulmonology)    Electronically signed by JEANNE Bates CNP on 8/27/2024 at 4:59 PM     This dictation was generated by voice recognition computer software.  Although all attempts are made to edit the dictation for accuracy, there may be errors in the transcription that are not intended.

## 2024-08-27 ENCOUNTER — PREP FOR PROCEDURE (OUTPATIENT)
Dept: SURGERY | Age: 49
End: 2024-08-27

## 2024-08-27 ENCOUNTER — TELEPHONE (OUTPATIENT)
Dept: SURGERY | Age: 49
End: 2024-08-27

## 2024-08-27 ENCOUNTER — OFFICE VISIT (OUTPATIENT)
Dept: PRIMARY CARE CLINIC | Age: 49
End: 2024-08-27
Payer: COMMERCIAL

## 2024-08-27 VITALS
SYSTOLIC BLOOD PRESSURE: 120 MMHG | OXYGEN SATURATION: 96 % | WEIGHT: 229 LBS | RESPIRATION RATE: 16 BRPM | HEART RATE: 62 BPM | TEMPERATURE: 97.1 F | BODY MASS INDEX: 41.22 KG/M2 | DIASTOLIC BLOOD PRESSURE: 82 MMHG

## 2024-08-27 DIAGNOSIS — J45.30 MILD PERSISTENT ASTHMA WITHOUT COMPLICATION: ICD-10-CM

## 2024-08-27 DIAGNOSIS — K22.70 BARRETT'S ESOPHAGUS WITHOUT DYSPLASIA: ICD-10-CM

## 2024-08-27 DIAGNOSIS — E21.3 HYPERPARATHYROIDISM (HCC): ICD-10-CM

## 2024-08-27 DIAGNOSIS — E66.01 MORBID OBESITY WITH BMI OF 40.0-44.9, ADULT (HCC): ICD-10-CM

## 2024-08-27 DIAGNOSIS — K52.9 COLITIS: Primary | ICD-10-CM

## 2024-08-27 DIAGNOSIS — Z87.442 HISTORY OF NEPHROLITHIASIS: ICD-10-CM

## 2024-08-27 PROCEDURE — 99214 OFFICE O/P EST MOD 30 MIN: CPT | Performed by: NURSE PRACTITIONER

## 2024-08-27 RX ORDER — FLUTICASONE PROPIONATE AND SALMETEROL XINAFOATE 115; 21 UG/1; UG/1
2 AEROSOL, METERED RESPIRATORY (INHALATION) 2 TIMES DAILY
Qty: 12 G | Refills: 2 | Status: SHIPPED | OUTPATIENT
Start: 2024-08-27

## 2024-08-27 RX ORDER — HYOSCYAMINE SULFATE 0.125 MG
125 TABLET ORAL EVERY 4 HOURS PRN
COMMUNITY
Start: 2024-08-20

## 2024-08-27 RX ORDER — ALBUTEROL SULFATE 90 UG/1
2 AEROSOL, METERED RESPIRATORY (INHALATION) EVERY 6 HOURS PRN
Qty: 18 G | Refills: 2 | Status: SHIPPED | OUTPATIENT
Start: 2024-08-27

## 2024-08-27 ASSESSMENT — ENCOUNTER SYMPTOMS
COUGH: 0
ABDOMINAL DISTENTION: 0
BACK PAIN: 1
TROUBLE SWALLOWING: 1
NAUSEA: 0
DIARRHEA: 0
SORE THROAT: 0
BLOOD IN STOOL: 0
WHEEZING: 0
SHORTNESS OF BREATH: 0
ABDOMINAL PAIN: 0
CHEST TIGHTNESS: 0
VOMITING: 0
CONSTIPATION: 1

## 2024-08-27 ASSESSMENT — PATIENT HEALTH QUESTIONNAIRE - PHQ9
1. LITTLE INTEREST OR PLEASURE IN DOING THINGS: NOT AT ALL
2. FEELING DOWN, DEPRESSED OR HOPELESS: NOT AT ALL
SUM OF ALL RESPONSES TO PHQ QUESTIONS 1-9: 0
SUM OF ALL RESPONSES TO PHQ9 QUESTIONS 1 & 2: 0

## 2024-11-04 ENCOUNTER — TELEPHONE (OUTPATIENT)
Dept: SURGERY | Age: 49
End: 2024-11-04

## 2024-11-04 RX ORDER — MULTIVIT WITH MINERALS/LUTEIN
250 TABLET ORAL DAILY
COMMUNITY

## 2024-11-04 RX ORDER — CALCIUM CARBONATE/VITAMIN D3 500-10/5ML
1 LIQUID (ML) ORAL DAILY
COMMUNITY

## 2024-11-04 NOTE — PROGRESS NOTES
Rere Cao    Age 49 y.o.    female    1975    MRN [id number]    11/8/2024  Arrival Time_____________  OR Time____________68 Min     Procedure(s):  EXCISION OF LIPOMAS FROM TORSO, BILATERAL ARMS AND LEGS                      General   Surgeon(s):  Storm Khalil, MD      DAY ADMIT ___  SDS/OP ___  OUTPT IN BED ___        Phone [phone] (home)                  PCP _____________________ Phone_________________ Epic ( ) Epic CE ( ) Appt ________    NOTES: _________________________________________ Consult/Cardio _______________    ____________________________________________________________________________    ____________________________________________________________________________  PAT APPT DATE:________ TIME: ________  FAXED QAD: _______  (__) H&P w/ Hospitalist    (__) PAT orders in EPIC    (__) Meet with PAT nurse  __________________________________________________________________________  Preop Nurse phone screen complete: _____________  (__) CBC     (__) W/ DIFF ___________  (__) CT CHEST  __________   (__) Hgb A1C    ___________  (__) CHEST X RAY   __________  (__) LIPID PROFILE  ___________  (__) EKG   __________  (__) PT-INR / APTT  ___________  (__) PFT's   __________  (__) BMP   ___________  (__) CAROTIDS  __________  (__) CMP   ___________  (__) VEIN MAPPING  __________  (__) U/A   ___________  ( X ) HISTORY & PHYSICAL __________  (__) URINE C & S  ___________  (__) CARDIAC CLEARANCE __________  (__) U/A W/ FLEX  ___________  (__) PULM. CLEARANCE __________  (__) SERUM PREGNANCY ___________  (__) Preop Orders in EPIC __________  (__) TYPE & SCREEN __________repeat ( ) (__)  __________________ __________  (__) Albumin   ___________  (__)  __________________ __________  (__) TRANSFERRIN  ___________  (__)  __________________ __________  (__) LIVER PROFILE  ___________  (__) URINE PREG DOS __________  (__) MRSA NASAL SWAB ___________  (__) BLOOD SUGAR DOS __________  (__) SED  RATE  ___________  (__) OAC  _________________________  (__) C-REACTIVE PROTEIN ___________    (__) VITAMIN D HYDROXY ___________  (__) BETABLOCKER  _________________                                                                                       (__) ACE/ARBS:__________________________    (__) GLP-1 Agonist ___________________                Ride home/Contact #_______________________   (__) SCLT2 inhibitor ___________________

## 2024-11-04 NOTE — PROGRESS NOTES
Surgery Date and Time: 11/8/24 @ 07:30 am    Arrival Time:  06:00 am        The instructions given when and if a patient needs to stop oral intake prior to surgery varies. Follow the instructions you were      given by your Surgeon or RN during the Pre-op call.      __X__Do not eat or drink anything after Midnight the night before the surgery. NO gum, mints, candy or ice chips the day of surgery.        Only take the following medications with a small sip of water the morning of surgery:  inhalers, if needed         Aspirin, Ibuprofen, Advil, Naproxen, Vitamin E and other Anti-inflammatory products and supplements should be stopped        for 5 -7days before surgery or as directed by your physician.                 NO DIABETES MEDICATIONS DAY OF SURGERY. LAST DOSE MOUNJARO 10/17/24.                - If you take a long acting-insulin, take your normal dose the night before surgery & half the dose if taken in the morning.     - Do not smoke or vape, and do not drink any alcoholic beverages 24 hours prior to surgery, this includes NA Beer. Refrain from using     any recreational drugs, including non-prescribed prescription drugs.     -You may brush your teeth and gargle the morning of surgery.  DO NOT SWALLOW WATER.    -You MUST plan for a responsible adult to stay on site while you are here and take you home after your surgery. You will not be allowed                to leave alone or drive yourself home. It is requested someone stay with you the first 24 hrs. Your surgery will be cancelled if you do not                have a ride home with a responsible adult.    -A parent/legal guardian must accompany a child scheduled for surgery and plan to stay at the hospital until the child                is discharged. Please do not bring other children with you.    -Please wear simple, loose-fitting clothing to the hospital. Do not bring valuables (money, credit cards, checkbooks, etc.)                Do not wear any

## 2024-11-07 ENCOUNTER — ANESTHESIA EVENT (OUTPATIENT)
Dept: OPERATING ROOM | Age: 49
End: 2024-11-07
Payer: COMMERCIAL

## 2024-11-08 ENCOUNTER — ANESTHESIA (OUTPATIENT)
Dept: OPERATING ROOM | Age: 49
End: 2024-11-08
Payer: COMMERCIAL

## 2024-11-08 ENCOUNTER — HOSPITAL ENCOUNTER (OUTPATIENT)
Age: 49
Setting detail: OUTPATIENT SURGERY
Discharge: HOME | End: 2024-11-08
Attending: SURGERY | Admitting: SURGERY
Payer: COMMERCIAL

## 2024-11-08 VITALS
HEART RATE: 64 BPM | DIASTOLIC BLOOD PRESSURE: 64 MMHG | HEIGHT: 63 IN | RESPIRATION RATE: 16 BRPM | BODY MASS INDEX: 38.98 KG/M2 | TEMPERATURE: 97.7 F | WEIGHT: 220 LBS | SYSTOLIC BLOOD PRESSURE: 112 MMHG | OXYGEN SATURATION: 99 %

## 2024-11-08 DIAGNOSIS — D17.79 LIPOMA OF OTHER SPECIFIED SITES: ICD-10-CM

## 2024-11-08 DIAGNOSIS — G89.18 POSTOPERATIVE PAIN: Primary | ICD-10-CM

## 2024-11-08 PROBLEM — D17.9 LIPOMA: Status: ACTIVE | Noted: 2024-11-08

## 2024-11-08 LAB
ANION GAP SERPL CALCULATED.3IONS-SCNC: 13 MMOL/L (ref 3–16)
BUN SERPL-MCNC: 23 MG/DL (ref 7–20)
CALCIUM SERPL-MCNC: 9.4 MG/DL (ref 8.3–10.6)
CHLORIDE SERPL-SCNC: 105 MMOL/L (ref 99–110)
CO2 SERPL-SCNC: 24 MMOL/L (ref 21–32)
CREAT SERPL-MCNC: 0.8 MG/DL (ref 0.6–1.1)
GFR SERPLBLD CREATININE-BSD FMLA CKD-EPI: 89 ML/MIN/{1.73_M2}
GLUCOSE SERPL-MCNC: 121 MG/DL (ref 70–99)
POTASSIUM SERPL-SCNC: 4.6 MMOL/L (ref 3.5–5.1)
SODIUM SERPL-SCNC: 142 MMOL/L (ref 136–145)

## 2024-11-08 PROCEDURE — 2500000003 HC RX 250 WO HCPCS: Performed by: NURSE ANESTHETIST, CERTIFIED REGISTERED

## 2024-11-08 PROCEDURE — 2580000003 HC RX 258: Performed by: ANESTHESIOLOGY

## 2024-11-08 PROCEDURE — 2500000003 HC RX 250 WO HCPCS: Performed by: ANESTHESIOLOGY

## 2024-11-08 PROCEDURE — 7100000000 HC PACU RECOVERY - FIRST 15 MIN: Performed by: SURGERY

## 2024-11-08 PROCEDURE — 6360000002 HC RX W HCPCS: Performed by: SURGERY

## 2024-11-08 PROCEDURE — 7100000001 HC PACU RECOVERY - ADDTL 15 MIN: Performed by: SURGERY

## 2024-11-08 PROCEDURE — 2709999900 HC NON-CHARGEABLE SUPPLY: Performed by: SURGERY

## 2024-11-08 PROCEDURE — 27327 EXC THIGH/KNEE LES SC < 3 CM: CPT | Performed by: SURGERY

## 2024-11-08 PROCEDURE — 88304 TISSUE EXAM BY PATHOLOGIST: CPT

## 2024-11-08 PROCEDURE — 6360000002 HC RX W HCPCS: Performed by: ANESTHESIOLOGY

## 2024-11-08 PROCEDURE — 25075 EXC FOREARM LES SC < 3 CM: CPT | Performed by: SURGERY

## 2024-11-08 PROCEDURE — 3700000001 HC ADD 15 MINUTES (ANESTHESIA): Performed by: SURGERY

## 2024-11-08 PROCEDURE — 3600000012 HC SURGERY LEVEL 2 ADDTL 15MIN: Performed by: SURGERY

## 2024-11-08 PROCEDURE — 24075 EXC ARM/ELBOW LES SC < 3 CM: CPT | Performed by: SURGERY

## 2024-11-08 PROCEDURE — 7100000011 HC PHASE II RECOVERY - ADDTL 15 MIN: Performed by: SURGERY

## 2024-11-08 PROCEDURE — 3700000000 HC ANESTHESIA ATTENDED CARE: Performed by: SURGERY

## 2024-11-08 PROCEDURE — 3600000002 HC SURGERY LEVEL 2 BASE: Performed by: SURGERY

## 2024-11-08 PROCEDURE — 7100000010 HC PHASE II RECOVERY - FIRST 15 MIN: Performed by: SURGERY

## 2024-11-08 PROCEDURE — 6360000002 HC RX W HCPCS: Performed by: NURSE ANESTHETIST, CERTIFIED REGISTERED

## 2024-11-08 PROCEDURE — 22902 EXC ABD LES SC < 3 CM: CPT | Performed by: SURGERY

## 2024-11-08 PROCEDURE — 80048 BASIC METABOLIC PNL TOTAL CA: CPT

## 2024-11-08 PROCEDURE — 2580000003 HC RX 258: Performed by: SURGERY

## 2024-11-08 RX ORDER — LIDOCAINE HYDROCHLORIDE 20 MG/ML
INJECTION, SOLUTION EPIDURAL; INFILTRATION; INTRACAUDAL; PERINEURAL
Status: DISCONTINUED | OUTPATIENT
Start: 2024-11-08 | End: 2024-11-08 | Stop reason: SDUPTHER

## 2024-11-08 RX ORDER — SODIUM CHLORIDE 9 MG/ML
INJECTION, SOLUTION INTRAVENOUS PRN
Status: DISCONTINUED | OUTPATIENT
Start: 2024-11-08 | End: 2024-11-08 | Stop reason: HOSPADM

## 2024-11-08 RX ORDER — PROPOFOL 10 MG/ML
INJECTION, EMULSION INTRAVENOUS
Status: DISCONTINUED | OUTPATIENT
Start: 2024-11-08 | End: 2024-11-08 | Stop reason: SDUPTHER

## 2024-11-08 RX ORDER — SODIUM CHLORIDE 0.9 % (FLUSH) 0.9 %
5-40 SYRINGE (ML) INJECTION PRN
Status: DISCONTINUED | OUTPATIENT
Start: 2024-11-08 | End: 2024-11-08 | Stop reason: HOSPADM

## 2024-11-08 RX ORDER — OXYCODONE HYDROCHLORIDE 5 MG/1
5 TABLET ORAL PRN
Status: DISCONTINUED | OUTPATIENT
Start: 2024-11-08 | End: 2024-11-08 | Stop reason: HOSPADM

## 2024-11-08 RX ORDER — ROCURONIUM BROMIDE 10 MG/ML
INJECTION, SOLUTION INTRAVENOUS
Status: DISCONTINUED | OUTPATIENT
Start: 2024-11-08 | End: 2024-11-08 | Stop reason: SDUPTHER

## 2024-11-08 RX ORDER — ONDANSETRON 2 MG/ML
INJECTION INTRAMUSCULAR; INTRAVENOUS
Status: DISCONTINUED | OUTPATIENT
Start: 2024-11-08 | End: 2024-11-08 | Stop reason: SDUPTHER

## 2024-11-08 RX ORDER — SODIUM CHLORIDE, SODIUM LACTATE, POTASSIUM CHLORIDE, CALCIUM CHLORIDE 600; 310; 30; 20 MG/100ML; MG/100ML; MG/100ML; MG/100ML
INJECTION, SOLUTION INTRAVENOUS CONTINUOUS
Status: DISCONTINUED | OUTPATIENT
Start: 2024-11-08 | End: 2024-11-08 | Stop reason: HOSPADM

## 2024-11-08 RX ORDER — ONDANSETRON 2 MG/ML
4 INJECTION INTRAMUSCULAR; INTRAVENOUS EVERY 30 MIN PRN
Status: DISCONTINUED | OUTPATIENT
Start: 2024-11-08 | End: 2024-11-08 | Stop reason: HOSPADM

## 2024-11-08 RX ORDER — DEXAMETHASONE SODIUM PHOSPHATE 4 MG/ML
INJECTION, SOLUTION INTRA-ARTICULAR; INTRALESIONAL; INTRAMUSCULAR; INTRAVENOUS; SOFT TISSUE
Status: DISCONTINUED | OUTPATIENT
Start: 2024-11-08 | End: 2024-11-08 | Stop reason: SDUPTHER

## 2024-11-08 RX ORDER — BUPIVACAINE HYDROCHLORIDE 5 MG/ML
INJECTION, SOLUTION EPIDURAL; INTRACAUDAL PRN
Status: DISCONTINUED | OUTPATIENT
Start: 2024-11-08 | End: 2024-11-08 | Stop reason: ALTCHOICE

## 2024-11-08 RX ORDER — SODIUM CHLORIDE 0.9 % (FLUSH) 0.9 %
5-40 SYRINGE (ML) INJECTION EVERY 12 HOURS SCHEDULED
Status: DISCONTINUED | OUTPATIENT
Start: 2024-11-08 | End: 2024-11-08 | Stop reason: HOSPADM

## 2024-11-08 RX ORDER — NALOXONE HYDROCHLORIDE 0.4 MG/ML
INJECTION, SOLUTION INTRAMUSCULAR; INTRAVENOUS; SUBCUTANEOUS PRN
Status: DISCONTINUED | OUTPATIENT
Start: 2024-11-08 | End: 2024-11-08 | Stop reason: HOSPADM

## 2024-11-08 RX ORDER — LIDOCAINE HYDROCHLORIDE 10 MG/ML
0.3 INJECTION, SOLUTION EPIDURAL; INFILTRATION; INTRACAUDAL; PERINEURAL
Status: DISCONTINUED | OUTPATIENT
Start: 2024-11-08 | End: 2024-11-08 | Stop reason: HOSPADM

## 2024-11-08 RX ORDER — OXYCODONE HYDROCHLORIDE 5 MG/1
10 TABLET ORAL PRN
Status: DISCONTINUED | OUTPATIENT
Start: 2024-11-08 | End: 2024-11-08 | Stop reason: HOSPADM

## 2024-11-08 RX ORDER — KETOROLAC TROMETHAMINE 30 MG/ML
INJECTION, SOLUTION INTRAMUSCULAR; INTRAVENOUS
Status: DISCONTINUED | OUTPATIENT
Start: 2024-11-08 | End: 2024-11-08 | Stop reason: SDUPTHER

## 2024-11-08 RX ORDER — FENTANYL CITRATE 50 UG/ML
INJECTION, SOLUTION INTRAMUSCULAR; INTRAVENOUS
Status: DISCONTINUED | OUTPATIENT
Start: 2024-11-08 | End: 2024-11-08 | Stop reason: SDUPTHER

## 2024-11-08 RX ORDER — OXYCODONE HYDROCHLORIDE 5 MG/1
5 TABLET ORAL EVERY 8 HOURS PRN
Qty: 6 TABLET | Refills: 0 | Status: SHIPPED | OUTPATIENT
Start: 2024-11-08 | End: 2024-11-11

## 2024-11-08 RX ADMIN — SUGAMMADEX 200 MG: 100 INJECTION, SOLUTION INTRAVENOUS at 09:21

## 2024-11-08 RX ADMIN — CEFAZOLIN 2000 MG: 2 INJECTION, POWDER, FOR SOLUTION INTRAVENOUS at 07:41

## 2024-11-08 RX ADMIN — FENTANYL CITRATE 50 MCG: 50 INJECTION, SOLUTION INTRAMUSCULAR; INTRAVENOUS at 09:10

## 2024-11-08 RX ADMIN — ONDANSETRON 4 MG: 2 INJECTION INTRAMUSCULAR; INTRAVENOUS at 07:38

## 2024-11-08 RX ADMIN — SODIUM CHLORIDE, SODIUM LACTATE, POTASSIUM CHLORIDE, AND CALCIUM CHLORIDE: .6; .31; .03; .02 INJECTION, SOLUTION INTRAVENOUS at 07:34

## 2024-11-08 RX ADMIN — DEXAMETHASONE SODIUM PHOSPHATE 4 MG: 4 INJECTION, SOLUTION INTRAMUSCULAR; INTRAVENOUS at 07:38

## 2024-11-08 RX ADMIN — FAMOTIDINE 20 MG: 10 INJECTION, SOLUTION INTRAVENOUS at 06:57

## 2024-11-08 RX ADMIN — PROPOFOL 150 MG: 10 INJECTION, EMULSION INTRAVENOUS at 07:38

## 2024-11-08 RX ADMIN — LIDOCAINE HYDROCHLORIDE 100 MG: 20 INJECTION, SOLUTION EPIDURAL; INFILTRATION; INTRACAUDAL at 07:38

## 2024-11-08 RX ADMIN — ONDANSETRON 4 MG: 2 INJECTION INTRAMUSCULAR; INTRAVENOUS at 09:55

## 2024-11-08 RX ADMIN — FENTANYL CITRATE 50 MCG: 50 INJECTION, SOLUTION INTRAMUSCULAR; INTRAVENOUS at 09:21

## 2024-11-08 RX ADMIN — KETOROLAC TROMETHAMINE 30 MG: 30 INJECTION, SOLUTION INTRAMUSCULAR; INTRAVENOUS at 07:50

## 2024-11-08 RX ADMIN — ROCURONIUM BROMIDE 25 MG: 50 INJECTION, SOLUTION INTRAVENOUS at 07:38

## 2024-11-08 ASSESSMENT — PAIN - FUNCTIONAL ASSESSMENT: PAIN_FUNCTIONAL_ASSESSMENT: 0-10

## 2024-11-08 ASSESSMENT — PAIN DESCRIPTION - DESCRIPTORS: DESCRIPTORS: ACHING

## 2024-11-08 ASSESSMENT — PAIN DESCRIPTION - ORIENTATION: ORIENTATION: MID

## 2024-11-08 ASSESSMENT — PAIN SCALES - GENERAL
PAINLEVEL_OUTOF10: 0
PAINLEVEL_OUTOF10: 4

## 2024-11-08 ASSESSMENT — PAIN DESCRIPTION - LOCATION: LOCATION: HEAD

## 2024-11-08 NOTE — PROGRESS NOTES
Discharge instructions given to pt and family. Verbalized understanding. PIV removed. Pt dressed and wheeled out and discharged to the care of their family in stable condition.

## 2024-11-08 NOTE — ANESTHESIA POSTPROCEDURE EVALUATION
Department of Anesthesiology  Postprocedure Note    Patient: Rere Cao  MRN: [id number]  YOB: 1975  Date of evaluation: 11/8/2024    Procedure Summary       Date: 11/08/24 Room / Location: [address]    Anesthesia Start: 0734 Anesthesia Stop: 0933    Procedure: EXCISION OF LIPOMAS FROM TORSO, BILATERAL ARMS AND LEGS (Abdomen) Diagnosis:       Lipoma of other specified sites      (Lipoma of other specified sites [D17.79])    Surgeons: Storm Khalil MD Responsible Provider: Sal Reed MD    Anesthesia Type: general ASA Status: 3            Anesthesia Type: No value filed.    Haven Phase I: Haven Score: 9    Haven Phase II:      Anesthesia Post Evaluation    Patient location during evaluation: PACU  Level of consciousness: awake  Airway patency: patent  Nausea & Vomiting: no vomiting  Cardiovascular status: blood pressure returned to baseline  Respiratory status: acceptable  Hydration status: stable  Pain management: adequate    No notable events documented.

## 2024-11-08 NOTE — DISCHARGE INSTRUCTIONS
[facility]    Bruce Brunner M.D.   [facility] Office      [facility] Office        [facility]               [address]                [address]  Aries Khalil M.D.              [address]           [address]          [place] [address]         [place] [address]  Zeeshan Dudley M.D                         [phone]        [facility]                   Abimael Srivastava M.D.          [facility]       POST-OPERATIVE INSTRUCTIONS FOLLOWING EXCISION    Call the office to schedule your post-operative appointment with your surgeon for two (2) weeks.     You will have either white steri-strips and a water occlusive dressing or surgical glue closing your incisions.    You may shower.  Wash incisions gently, and pat them dry. Do not rub your incisions.    You will have pain medicine ordered. Take as directed.    Do NOT drive while taking your narcotic pain medicine.     Watch for signs of infection:       Fever over 100.5°     Excessive warmth, or bright redness around your incision    Leakage of bloody or cloudy fluid from you incisions  ANESTHESIA DISCHARGE INSTRUCTIONS    You are under the influence of drugs- do not drink alcohol, drive a car, operate machinery(such as power tools, kitchen appliances, etc), sign legal documents, or make any important decisions for 24 hours (or while on pain medications).   Children should not ride bikes or skate boards or play on gym sets  for 24 hours after surgery.  A responsible adult should be with you for 24 hours.  Rest at home today- increase activity as tolerated.  Progress slowly to a regular diet unless your physician has instructed you otherwise. Drink plenty of water.    CALL YOUR DOCTOR IF YOU:  Have moderate to severe nausea or vomiting AND are unable to hold down fluids or prescribed medications.  Have bright red bloody drainage from

## 2024-11-08 NOTE — PROGRESS NOTES
Pt brought to PACU. Report obtained from OR RN and anesthesia. Pt placed on monitor NSR and O2 at 95% on RA.

## 2024-11-08 NOTE — OP NOTE
Operative Note      Patient: Rere Cao  YOB: 1975  MRN: [id number]    Date of Procedure: 11/8/2024    Pre-Op Diagnosis Codes:      * Lipoma of other specified sites [D17.79]    Post-Op Diagnosis: Same       Procedure(s):  EXCISION OF LIPOMAS FROM TORSO, BILATERAL ARMS AND LEGS    Surgeon(s):  Storm Khalil MD    Assistant:   Surgical Assistant: Amarilis Glez    Anesthesia: General    Estimated Blood Loss (mL): Minimal    Complications: None    Specimens:   ID Type Source Tests Collected by Time Destination   A : MULTIPLE LIPOMAS Tissue Tissue SURGICAL PATHOLOGY Storm Khalil MD 11/8/2024 0906        Implants:  * No implants in log *      Drains: * No LDAs found *    Findings:  Infection Present At Time Of Surgery (PATOS) (choose all levels that have infection present):  No infection present  Other Findings: as below  This procedure was not performed to treat primary cutaneous melanoma through wide local excision    Detailed Description of Procedure:   Patient was given adequate description of the risks and rewards of the procedure, including bleeding, infection, and freely consented.  Each lipoma was marked with patient assistance in preop area.  Pt was given appropriate antibiotics and brought to the OR where GETA anesthesia was induced.  Pt was placed in supine position.  Prepped and draped in usual sterile fashion.     Area over each lipoma anesthetized with local anesthetic.  Over each an incision made and carried down circumferentially around each lipoma using combination of blunt dissection and electrocautery.  Each was removed and sent to pathology and ranged from 1-2cm in size.  There were nine from the left thigh, one from left forearm, three from right thigh, one from right forearm, two from right upper arm, and seven from abdomen removed.   They were all in subcutaneous space.  Wounds closed with interrupted 3-0 vicryl.     Sterile dressing placed.  All suture, sponge  and instrument count correct times two at end of case.  Transferred to PACU in stable condition.    Aries Khalil MD       Electronically signed by Storm Khalil MD on 11/8/2024 at 12:04 PM

## 2024-11-08 NOTE — ANESTHESIA PRE PROCEDURE
10/21/2021 04:05 PM       COVID-19 Screening (If Applicable): No results found for: \"COVID19\"        Anesthesia Evaluation    Airway: Mallampati: I          Dental: normal exam         Pulmonary: breath sounds clear to auscultation                             Cardiovascular:            Rhythm: regular  Rate: normal                    Neuro/Psych:               GI/Hepatic/Renal:             Endo/Other:                     Abdominal:   (+) obese          Vascular:          Other Findings:       Anesthesia Plan      general     ASA 3                               Sal Reed MD   11/8/2024

## 2024-11-25 ENCOUNTER — OFFICE VISIT (OUTPATIENT)
Dept: SURGERY | Age: 49
End: 2024-11-25

## 2024-11-25 VITALS
SYSTOLIC BLOOD PRESSURE: 122 MMHG | DIASTOLIC BLOOD PRESSURE: 76 MMHG | HEIGHT: 63 IN | WEIGHT: 229.6 LBS | BODY MASS INDEX: 40.68 KG/M2

## 2024-11-25 DIAGNOSIS — D17.79 LIPOMA OF OTHER SPECIFIED SITES: Primary | ICD-10-CM

## 2024-11-25 PROCEDURE — 99024 POSTOP FOLLOW-UP VISIT: CPT | Performed by: SURGERY

## 2024-11-27 NOTE — PROGRESS NOTES
HPI: Nursing notes reviewed.  Patient without pain or drainage.    ROS:  10 point review of systems performed with pertinent positives in HPI    Phys:    Incisions - no erythema or drainage    Assesment: 48 yo s/p lipoma excisions    Plan: 1.  Discussed benign pathology   2. No activity limitations

## 2025-03-30 NOTE — PROGRESS NOTES
Neck:      Thyroid: No thyromegaly.      Vascular: No carotid bruit or JVD.      Trachea: No tracheal deviation.   Cardiovascular:      Rate and Rhythm: Normal rate and regular rhythm.      Heart sounds: Normal heart sounds. No murmur heard.     No friction rub.   Pulmonary:      Effort: Pulmonary effort is normal. No respiratory distress.      Breath sounds: Normal breath sounds. No stridor. No wheezing, rhonchi or rales.   Abdominal:      General: Abdomen is flat. Bowel sounds are normal. There is no distension.      Palpations: Abdomen is soft. There is no mass.      Tenderness: There is no abdominal tenderness. There is no guarding or rebound.      Hernia: No hernia is present.   Musculoskeletal:         General: No deformity.      Right lower leg: No edema.      Left lower leg: No edema.   Lymphadenopathy:      Cervical: No cervical adenopathy.   Skin:     General: Skin is warm and dry.      Findings: No erythema or rash.   Neurological:      General: No focal deficit present.      Mental Status: She is alert and oriented to person, place, and time.      Motor: No weakness.      Gait: Gait normal.   Psychiatric:         Mood and Affect: Mood normal.         Behavior: Behavior normal.         Thought Content: Thought content normal.         Judgment: Judgment normal.          Past Surgical History:   Procedure Laterality Date     SECTION      CHOLECYSTECTOMY      COLONOSCOPY N/A 2022    COLONOSCOPY POLYPECTOMY SNARE/COLD BIOPSY performed by Coral Connell MD at [facility] ENDOSCOPY    EXCISION/BIOPSY N/A 2024    EXCISION OF LIPOMAS FROM TORSO, BILATERAL ARMS AND LEGS performed by Storm Khalil MD at [facility] OR    HYSTERECTOMY, VAGINAL      INSERTABLE CARDIAC MONITOR      KIDNEY STONE SURGERY      TUBAL LIGATION  10/21/2003    UPPER GASTROINTESTINAL ENDOSCOPY N/A 2022    EGD BIOPSY performed by Coral Connell MD at [facility] ENDOSCOPY    UPPER GASTROINTESTINAL ENDOSCOPY  2022

## 2025-04-01 ENCOUNTER — OFFICE VISIT (OUTPATIENT)
Dept: PRIMARY CARE CLINIC | Age: 50
End: 2025-04-01
Payer: COMMERCIAL

## 2025-04-01 VITALS
BODY MASS INDEX: 40.65 KG/M2 | SYSTOLIC BLOOD PRESSURE: 130 MMHG | TEMPERATURE: 97.8 F | DIASTOLIC BLOOD PRESSURE: 76 MMHG | WEIGHT: 226 LBS | RESPIRATION RATE: 16 BRPM | OXYGEN SATURATION: 98 % | HEART RATE: 65 BPM

## 2025-04-01 DIAGNOSIS — K21.9 GASTROESOPHAGEAL REFLUX DISEASE WITHOUT ESOPHAGITIS: ICD-10-CM

## 2025-04-01 DIAGNOSIS — B00.1 RECURRENT COLD SORES: ICD-10-CM

## 2025-04-01 DIAGNOSIS — J45.30 MILD PERSISTENT ASTHMA WITHOUT COMPLICATION: ICD-10-CM

## 2025-04-01 DIAGNOSIS — Z82.49 FAMILY HISTORY OF HEART DISEASE: ICD-10-CM

## 2025-04-01 DIAGNOSIS — Z87.442 HISTORY OF NEPHROLITHIASIS: ICD-10-CM

## 2025-04-01 DIAGNOSIS — G47.33 OSA (OBSTRUCTIVE SLEEP APNEA): ICD-10-CM

## 2025-04-01 DIAGNOSIS — E21.3 HYPERPARATHYROIDISM: ICD-10-CM

## 2025-04-01 DIAGNOSIS — Z00.00 ANNUAL PHYSICAL EXAM: Primary | ICD-10-CM

## 2025-04-01 DIAGNOSIS — E66.01 CLASS 3 SEVERE OBESITY WITH SERIOUS COMORBIDITY AND BODY MASS INDEX (BMI) OF 40.0 TO 44.9 IN ADULT, UNSPECIFIED OBESITY TYPE: ICD-10-CM

## 2025-04-01 DIAGNOSIS — E66.813 CLASS 3 SEVERE OBESITY WITH SERIOUS COMORBIDITY AND BODY MASS INDEX (BMI) OF 40.0 TO 44.9 IN ADULT, UNSPECIFIED OBESITY TYPE: ICD-10-CM

## 2025-04-01 DIAGNOSIS — E55.9 VITAMIN D DEFICIENCY: ICD-10-CM

## 2025-04-01 DIAGNOSIS — Z00.00 ANNUAL PHYSICAL EXAM: ICD-10-CM

## 2025-04-01 DIAGNOSIS — K22.70 BARRETT'S ESOPHAGUS WITHOUT DYSPLASIA: ICD-10-CM

## 2025-04-01 PROBLEM — R79.89 ELEVATED PARATHYROID HORMONE: Status: RESOLVED | Noted: 2023-02-13 | Resolved: 2025-04-01

## 2025-04-01 LAB
25(OH)D3 SERPL-MCNC: 31.4 NG/ML
ALBUMIN SERPL-MCNC: 4.3 G/DL (ref 3.4–5)
ALBUMIN/GLOB SERPL: 1.9 {RATIO} (ref 1.1–2.2)
ALP SERPL-CCNC: 69 U/L (ref 40–129)
ALT SERPL-CCNC: 20 U/L (ref 10–40)
ANION GAP SERPL CALCULATED.3IONS-SCNC: 9 MMOL/L (ref 3–16)
AST SERPL-CCNC: 17 U/L (ref 15–37)
BILIRUB SERPL-MCNC: 0.3 MG/DL (ref 0–1)
BUN SERPL-MCNC: 15 MG/DL (ref 7–20)
CALCIUM SERPL-MCNC: 9.8 MG/DL (ref 8.3–10.6)
CHLORIDE SERPL-SCNC: 103 MMOL/L (ref 99–110)
CHOLEST SERPL-MCNC: 224 MG/DL (ref 0–199)
CO2 SERPL-SCNC: 27 MMOL/L (ref 21–32)
CREAT SERPL-MCNC: 0.8 MG/DL (ref 0.6–1.1)
DEPRECATED RDW RBC AUTO: 13.3 % (ref 12.4–15.4)
EST. AVERAGE GLUCOSE BLD GHB EST-MCNC: 111.2 MG/DL
GFR SERPLBLD CREATININE-BSD FMLA CKD-EPI: 89 ML/MIN/{1.73_M2}
GLUCOSE SERPL-MCNC: 95 MG/DL (ref 70–99)
HBA1C MFR BLD: 5.5 %
HCT VFR BLD AUTO: 42.5 % (ref 36–48)
HDLC SERPL-MCNC: 51 MG/DL (ref 40–60)
HGB BLD-MCNC: 14.3 G/DL (ref 12–16)
LDLC SERPL CALC-MCNC: 148 MG/DL
MCH RBC QN AUTO: 29.8 PG (ref 26–34)
MCHC RBC AUTO-ENTMCNC: 33.6 G/DL (ref 31–36)
MCV RBC AUTO: 88.7 FL (ref 80–100)
PLATELET # BLD AUTO: 307 K/UL (ref 135–450)
PMV BLD AUTO: 9.5 FL (ref 5–10.5)
POTASSIUM SERPL-SCNC: 4.7 MMOL/L (ref 3.5–5.1)
PROT SERPL-MCNC: 6.6 G/DL (ref 6.4–8.2)
PTH-INTACT SERPL-MCNC: 44.1 PG/ML (ref 14–72)
RBC # BLD AUTO: 4.79 M/UL (ref 4–5.2)
SODIUM SERPL-SCNC: 139 MMOL/L (ref 136–145)
T3FREE SERPL-MCNC: 2.9 PG/ML (ref 2.3–4.2)
TRIGL SERPL-MCNC: 126 MG/DL (ref 0–150)
TSH SERPL DL<=0.005 MIU/L-ACNC: 1.62 UIU/ML (ref 0.27–4.2)
VLDLC SERPL CALC-MCNC: 25 MG/DL
WBC # BLD AUTO: 5.2 K/UL (ref 4–11)

## 2025-04-01 PROCEDURE — 99396 PREV VISIT EST AGE 40-64: CPT | Performed by: NURSE PRACTITIONER

## 2025-04-01 RX ORDER — LORATADINE 10 MG/1
10 TABLET ORAL DAILY
COMMUNITY

## 2025-04-01 RX ORDER — FLUTICASONE PROPIONATE AND SALMETEROL XINAFOATE 115; 21 UG/1; UG/1
2 AEROSOL, METERED RESPIRATORY (INHALATION) 2 TIMES DAILY
Qty: 12 G | Refills: 2 | Status: SHIPPED | OUTPATIENT
Start: 2025-04-01

## 2025-04-01 RX ORDER — VALACYCLOVIR HYDROCHLORIDE 500 MG/1
TABLET, FILM COATED ORAL
Qty: 30 TABLET | Refills: 2 | Status: SHIPPED | OUTPATIENT
Start: 2025-04-01

## 2025-04-01 RX ORDER — MONTELUKAST SODIUM 10 MG/1
10 TABLET ORAL DAILY
Qty: 30 TABLET | Refills: 2 | Status: SHIPPED | OUTPATIENT
Start: 2025-04-01

## 2025-04-01 RX ORDER — ALBUTEROL SULFATE 90 UG/1
2 INHALANT RESPIRATORY (INHALATION) EVERY 6 HOURS PRN
Qty: 18 G | Refills: 2 | Status: SHIPPED | OUTPATIENT
Start: 2025-04-01

## 2025-04-01 SDOH — ECONOMIC STABILITY: FOOD INSECURITY: WITHIN THE PAST 12 MONTHS, YOU WORRIED THAT YOUR FOOD WOULD RUN OUT BEFORE YOU GOT MONEY TO BUY MORE.: NEVER TRUE

## 2025-04-01 SDOH — ECONOMIC STABILITY: FOOD INSECURITY: WITHIN THE PAST 12 MONTHS, THE FOOD YOU BOUGHT JUST DIDN'T LAST AND YOU DIDN'T HAVE MONEY TO GET MORE.: NEVER TRUE

## 2025-04-01 ASSESSMENT — PATIENT HEALTH QUESTIONNAIRE - PHQ9
SUM OF ALL RESPONSES TO PHQ QUESTIONS 1-9: 0
1. LITTLE INTEREST OR PLEASURE IN DOING THINGS: NOT AT ALL
SUM OF ALL RESPONSES TO PHQ QUESTIONS 1-9: 0
2. FEELING DOWN, DEPRESSED OR HOPELESS: NOT AT ALL

## 2025-04-01 ASSESSMENT — ENCOUNTER SYMPTOMS
BACK PAIN: 1
ABDOMINAL DISTENTION: 0
NAUSEA: 0
BLOOD IN STOOL: 0
COUGH: 0
CONSTIPATION: 1
SORE THROAT: 0
VOMITING: 0
SHORTNESS OF BREATH: 0
WHEEZING: 0
DIARRHEA: 0
CHEST TIGHTNESS: 0
ABDOMINAL PAIN: 0
TROUBLE SWALLOWING: 0

## 2025-04-02 ENCOUNTER — RESULTS FOLLOW-UP (OUTPATIENT)
Dept: PRIMARY CARE CLINIC | Age: 50
End: 2025-04-02

## (undated) DEVICE — ENDOSCOPIC KIT 2 12 FT OP4 DE2 GWN SYR

## (undated) DEVICE — SUTURE VICRYL + SZ 3-0 L18IN ABSRB UD SH 1/2 CIR TAPERCUT NDL VCP864D

## (undated) DEVICE — 10FR FRAZIER SUCTION HANDLE: Brand: CARDINAL HEALTH

## (undated) DEVICE — SNARE ENDOSCP L240CM SHTH DIA24MM LOOP W10MM POLYP RND REINF

## (undated) DEVICE — CONMED SCOPE SAVER BITE BLOCK, 20X27 MM: Brand: SCOPE SAVER

## (undated) DEVICE — TRAP SPEC POLYPR SGL CHMBR FN MESH SCRN

## (undated) DEVICE — ENDOSCOPIC KIT COLON W/ 1.1 OZ 2 END SEAL HYDR ORCA BLU

## (undated) DEVICE — FORCEPS BX L240CM JAW DIA2.8MM L CAP W/ NDL MIC MESH TOOTH

## (undated) DEVICE — SYRINGE,EAR/ULCER, 2 OZ, STERILE: Brand: MEDLINE

## (undated) DEVICE — CANNULA NSL AD TBNG L7FT PVC STR NONFLARED PRNG O2 DEL W STD

## (undated) DEVICE — Device

## (undated) DEVICE — GOWN,PREVENTION PLUS,XL,ST,24/CS: Brand: MEDLINE

## (undated) DEVICE — ELECTRODE,ECG,STRESS,FOAM,3PK: Brand: MEDLINE

## (undated) DEVICE — NEPTUNE E-SEP SMOKE EVACUATION PENCIL, COATED, 70MM BLADE, PUSH BUTTON SWITCH: Brand: NEPTUNE E-SEP